# Patient Record
Sex: FEMALE | Race: WHITE | HISPANIC OR LATINO | Employment: OTHER | URBAN - METROPOLITAN AREA
[De-identification: names, ages, dates, MRNs, and addresses within clinical notes are randomized per-mention and may not be internally consistent; named-entity substitution may affect disease eponyms.]

---

## 2017-01-16 ENCOUNTER — OFFICE VISIT (OUTPATIENT)
Dept: SURGERY | Facility: CLINIC | Age: 57
End: 2017-01-16

## 2017-01-16 VITALS
DIASTOLIC BLOOD PRESSURE: 72 MMHG | BODY MASS INDEX: 30.56 KG/M2 | HEIGHT: 64 IN | WEIGHT: 179 LBS | SYSTOLIC BLOOD PRESSURE: 102 MMHG

## 2017-01-16 DIAGNOSIS — Z09 FOLLOW UP: Primary | ICD-10-CM

## 2017-01-16 PROCEDURE — 99213 OFFICE O/P EST LOW 20 MIN: CPT | Performed by: SURGERY

## 2017-01-16 RX ORDER — SUCRALFATE ORAL 1 G/10ML
1 SUSPENSION ORAL 4 TIMES DAILY
Qty: 1200 ML | Refills: 0 | Status: SHIPPED | OUTPATIENT
Start: 2017-01-16 | End: 2017-02-15

## 2017-01-16 NOTE — PROGRESS NOTES
PATIENT INFORMATION  Violet Rahman   - 1960    CHIEF COMPLAINT  F/U EGD AND C/S, ABD PAIN, BLOATING    HISTORY OF PRESENT ILLNESS  HPI  Patient presents to the office today for follow-up.  She status post upper and lower endoscopies for abdominal pain bloating and constipation.  She reports she is doing very well she stopped taking the Carafate as she could not take the pill form.  She did not call the office to inform us.  She reports she is taking Protonix.  She is also on Metamucil as needed.  She reports her acid reflux, nausea and epigastric abdominal pain much improved.  She will still feel bloated especially when she is constipated.  She is modifying her diet and using Metamucil as needed.  Upper endoscopy and colonoscopy results discussed in detail with the patient.  I have recommended to her to continue PPI and Carafate.  I have also informed her that she will need a repeat colonoscopy in one year with a 2 day prep due to the poor prep.    REVIEW OF SYSTEMS  Review of Systems   Constitutional: Negative.    Respiratory: Negative.    Gastrointestinal: Positive for abdominal distention and constipation.   Genitourinary: Negative.    Musculoskeletal: Negative.    Skin: Negative.    Hematological: Negative.    Psychiatric/Behavioral: Negative.          ACTIVE PROBLEMS  Patient Active Problem List    Diagnosis   • Gastroesophageal reflux disease [K21.9]   • Family history of colon cancer requiring screening colonoscopy [Z80.0]   • Constipation [K59.00]   • Nausea [R11.0]   • Constipation [K59.00]         PAST MEDICAL HISTORY  Past Medical History   Diagnosis Date   • Anxiety    • Depression    • Diabetes    • Hypertension    • Hypothyroidism    • OA (osteoarthritis)    • OAB (overactive bladder)    • AMELIA (obstructive sleep apnea)    • Stomach ulcer          SURGICAL HISTORY  Past Surgical History   Procedure Laterality Date   • Knee surgery Right    • Laparoscopic cholecystectomy     •  section        X2   • Endoscopy N/A 12/27/2016     Procedure: ESOPHAGOGASTRODUODENOSCOPY with biopsies  and link test;  Surgeon: Raiza Talbot MD;  Location: [facility] OR;  Service:    • Colonoscopy N/A 12/27/2016     Procedure: COLONOSCOPY;  Surgeon: Raiza Talbot MD;  Location: [facility] OR;  Service:          FAMILY HISTORY  Family History   Problem Relation Age of Onset   • Hypertension Mother    • Stroke Mother    • Diabetes Mother    • Lung cancer Father    • Diabetes Sister    • Colon cancer Sister          SOCIAL HISTORY  Social History     Occupational History   • Not on file.     Social History Main Topics   • Smoking status: Never Smoker   • Smokeless tobacco: Never Used   • Alcohol use No   • Drug use: No   • Sexual activity: Defer         CURRENT MEDICATIONS    Current Outpatient Prescriptions:   •  atorvastatin (LIPITOR) 10 MG tablet, Take 80 mg by mouth Daily., Disp: , Rfl:   •  ferrous sulfate 325 (65 FE) MG tablet, Take 325 mg by mouth Daily With Breakfast., Disp: , Rfl:   •  fesoterodine fumarate (TOVIAZ ER) 8 MG tablet sustained-release 24 hour capsule, Take 8 mg by mouth Daily., Disp: , Rfl:   •  fluticasone (FLONASE) 50 MCG/ACT nasal spray, 2 sprays into each nostril Daily., Disp: , Rfl:   •  glipiZIDE (GLUCOTROL) 10 MG tablet, Take 10 mg by mouth 2 (Two) Times a Day Before Meals., Disp: , Rfl:   •  HYDROcodone-acetaminophen (NORCO)  MG per tablet, Take 1 tablet by mouth Every 6 (Six) Hours As Needed for moderate pain (4-6)., Disp: , Rfl:   •  levothyroxine (SYNTHROID, LEVOTHROID) 50 MCG tablet, Take 50 mcg by mouth Daily., Disp: , Rfl:   •  lisinopril (PRINIVIL,ZESTRIL) 10 MG tablet, Take 10 mg by mouth Daily., Disp: , Rfl:   •  metFORMIN (GLUCOPHAGE) 500 MG tablet, Take 1,000 mg by mouth., Disp: , Rfl:   •  pantoprazole (PROTONIX) 40 MG EC tablet, Take 1 tablet by mouth Daily for 30 days., Disp: 30 tablet, Rfl: 0  •  sucralfate (CARAFATE) 1 GM/10ML suspension, Take 10 mL by mouth 4 (Four) Times a  "Day for 30 days., Disp: 1200 mL, Rfl: 0  •  zolpidem (AMBIEN) 5 MG tablet, Take 5 mg by mouth At Night As Needed for sleep., Disp: , Rfl:     ALLERGIES  Review of patient's allergies indicates no known allergies.    VITALS  Vitals:    01/16/17 0941   BP: 102/72   Weight: 179 lb (81.2 kg)   Height: 64\" (162.6 cm)       LAST RESULTS   Admission on 12/27/2016, Discharged on 12/27/2016   Component Date Value Ref Range Status   • Glucose 12/27/2016 110  70 - 130 mg/dL Final   • Case Report 12/27/2016    Final                    Value:Surgical Pathology Report                         Case: [id number]                                  Authorizing Provider:  Raiza Talbot MD       Collected:           12/27/2016 09:07 AM          Ordering Location:     [facility]   Received:            12/27/2016 12:57 PM                                 OR                                                                           Pathologist:           Daren Wiseman MD                                                      Specimens:   1) - Small Intestine, Duodenum, small bowel bx                                                      2) - Stomach, gastric bx                                                                            3) - Esophagus, Distal, bx                                                                • Final Diagnosis 12/27/2016    Final                    Value:This result contains rich text formatting which cannot be displayed here.   • Urease 12/27/2016 Negative  Negative Final     No results found.    PHYSICAL EXAM  Physical Exam   Constitutional: She is oriented to person, place, and time. She appears well-developed and well-nourished.   HENT:   Head: Normocephalic and atraumatic.   Eyes: No scleral icterus.   Neck: Normal range of motion. Neck supple.   Cardiovascular: Normal rate, regular rhythm and normal heart sounds.    Pulmonary/Chest: Breath sounds normal.   Abdominal:   Soft nondistended " nontender positive bowel sounds in all 4 quadrants   Lymphadenopathy:     She has no cervical adenopathy.   Neurological: She is alert and oriented to person, place, and time.   Skin: Skin is warm and dry.   Nursing note and vitals reviewed.      ASSESSMENT    GERD with esophagitis/gastritis -- continue PPI and Carafate.  Carafate liquid suspension E scribed.  GERD dietary last on modification discussed.  Caffeine nicotine alcohol cessation discussed patient is agreeable.    Constipation/diverticulosis/internal hemorrhoids  Family history of colon cancer and polyps  Colonoscopy-with poor prep.  Will need repeat colonoscopy in one year with a 2 day prep.  High-fiber instruction sheet provided.  List of over-the-counter fiber supplements and probiotics provided.    PLAN  We'll place on recall list for one year for colonoscopy with 2 day prep.  We'll follow-up in the office in 3 months.  Patient was advised to call the office sooner should she have worsening symptoms or go to the nearest emergency room.                          a

## 2017-01-16 NOTE — MR AVS SNAPSHOT
Violet Rahman   1/16/2017 10:45 AM   Office Visit    Dept Phone:  [phone]   Encounter #:  [id number]    Provider:  Raiza Talbot MD   Department:  [facility] GENERAL SURGERY                Your Full Care Plan              Today's Medication Changes          These changes are accurate as of: 1/16/17 10:24 AM.  If you have any questions, ask your nurse or doctor.               New Medication(s)Ordered:     sucralfate 1 GM/10ML suspension   Commonly known as:  CARAFATE   Take 10 mL by mouth 4 (Four) Times a Day for 30 days.   Replaces:  sucralfate 1 G tablet   Started by:  Raiza Talbot MD         Stop taking medication(s)listed here:     sucralfate 1 G tablet   Commonly known as:  CARAFATE   Replaced by:  sucralfate 1 GM/10ML suspension   Stopped by:  Raiza Talbot MD                Where to Get Your Medications      These medications were sent to [address] [phone] [place] [id number] [address] [address]     Phone:  [phone]     sucralfate 1 GM/10ML suspension                  Your Updated Medication List          This list is accurate as of: 1/16/17 10:24 AM.  Always use your most recent med list.                atorvastatin 10 MG tablet   Commonly known as:  LIPITOR       ferrous sulfate 325 (65 FE) MG tablet       fesoterodine fumarate 8 MG tablet sustained-release 24 hour capsule   Commonly known as:  TOVIAZ ER       fluticasone 50 MCG/ACT nasal spray   Commonly known as:  FLONASE       glipiZIDE 10 MG tablet   Commonly known as:  GLUCOTROL       HYDROcodone-acetaminophen  MG per tablet   Commonly known as:  NORCO       levothyroxine 50 MCG tablet   Commonly known as:  SYNTHROID, LEVOTHROID       lisinopril 10 MG tablet   Commonly known as:  PRINIVIL,ZESTRIL       metFORMIN 500 MG tablet   Commonly known as:  GLUCOPHAGE       pantoprazole 40 MG EC tablet   Commonly  known as:  PROTONIX   Take 1 tablet by mouth Daily for 30 days.       sucralfate 1 GM/10ML suspension   Commonly known as:  CARAFATE   Take 10 mL by mouth 4 (Four) Times a Day for 30 days.       zolpidem 5 MG tablet   Commonly known as:  AMBIEN               Instructions     None    Patient Instructions History      Upcoming Appointments     Visit Type Date Time Department    OFFICE VISIT 2017 10:45 AM [facility]    OFFICE VISIT 2017 10:00 AM [facility]      [employer] Signup     [facility] [employer] allows you to send messages to your doctor, view your test results, renew your prescriptions, schedule appointments, and more. To sign up, go to [employer] and click on the Sign Up Now link in the New User? box. Enter your [employer] Activation Code exactly as it appears below along with the last four digits of your Social Security Number and your Date of Birth () to complete the sign-up process. If you do not sign up before the expiration date, you must request a new code.    [employer] Activation Code: [email or web address]  Expires: 2017 10:24 AM    If you have questions, you can email [email or web address] or call [phone] to talk to our [employer] staff. Remember, [employer] is NOT to be used for urgent needs. For medical emergencies, dial 911.               Other Info from Your Visit           Your Appointments     2017 10:45 AM EST   Office Visit with Raiza Talbot MD   [facility] GENERAL SURGERY (--)    [address]  [place] [id number] [phone]           Arrive 15 minutes prior to appointment.            2017 10:00 AM EDT   Office Visit with Raiza Talbot MD   [facility] GENERAL SURGERY (--)    [address]  [place] [id number] [phone]           Arrive 15 minutes prior to appointment.              Allergies     No Known Allergies      Reason for Visit      "Follow-up           Vital Signs     Blood Pressure Height Weight Body Mass Index Smoking Status       102/72 64\" (162.6 cm) 179 lb (81.2 kg) 30.73 kg/m2 Never Smoker         "

## 2017-01-16 NOTE — LETTER
2017     Sukhjinder Oneill, APRN  [address]  [place] [address]    Patient: Violet Rahman   YOB: 1960   Date of Visit: 2017       Dear Dr. Oneill, PATRIZIA:    Thank you for referring Violet Rahman to me for evaluation. Below are the relevant portions of my assessment and plan of care.    If you have questions, please do not hesitate to call me. I look forward to following Violet along with you.         Sincerely,        Raiza Talbot MD        CC: No Recipients  Raiza Talbot MD  2017  2:42 PM  Sign at close encounter      PATIENT INFORMATION  Violet Rahman   - 1960    CHIEF COMPLAINT  F/U EGD AND C/S, ABD PAIN, BLOATING    HISTORY OF PRESENT ILLNESS  HPI  Patient presents to the office today for follow-up.  She status post upper and lower endoscopies for abdominal pain bloating and constipation.  She reports she is doing very well she stopped taking the Carafate as she could not take the pill form.  She did not call the office to inform us.  She reports she is taking Protonix.  She is also on Metamucil as needed.  She reports her acid reflux, nausea and epigastric abdominal pain much improved.  She will still feel bloated especially when she is constipated.  She is modifying her diet and using Metamucil as needed.  Upper endoscopy and colonoscopy results discussed in detail with the patient.  I have recommended to her to continue PPI and Carafate.  I have also informed her that she will need a repeat colonoscopy in one year with a 2 day prep due to the poor prep.    REVIEW OF SYSTEMS  Review of Systems   Constitutional: Negative.    Respiratory: Negative.    Gastrointestinal: Positive for abdominal distention and constipation.   Genitourinary: Negative.    Musculoskeletal: Negative.    Skin: Negative.    Hematological: Negative.    Psychiatric/Behavioral: Negative.          ACTIVE PROBLEMS  Patient Active Problem List    Diagnosis   • Gastroesophageal reflux disease  [K21.9]   • Family history of colon cancer requiring screening colonoscopy [Z80.0]   • Constipation [K59.00]   • Nausea [R11.0]   • Constipation [K59.00]         PAST MEDICAL HISTORY  Past Medical History   Diagnosis Date   • Anxiety    • Depression    • Diabetes    • Hypertension    • Hypothyroidism    • OA (osteoarthritis)    • OAB (overactive bladder)    • AMELIA (obstructive sleep apnea)    • Stomach ulcer          SURGICAL HISTORY  Past Surgical History   Procedure Laterality Date   • Knee surgery Right    • Laparoscopic cholecystectomy     •  section       X2   • Endoscopy N/A 2016     Procedure: ESOPHAGOGASTRODUODENOSCOPY with biopsies  and link test;  Surgeon: Raiza Talbot MD;  Location: [facility] OR;  Service:    • Colonoscopy N/A 2016     Procedure: COLONOSCOPY;  Surgeon: Raiza Talbot MD;  Location: [facility] OR;  Service:          FAMILY HISTORY  Family History   Problem Relation Age of Onset   • Hypertension Mother    • Stroke Mother    • Diabetes Mother    • Lung cancer Father    • Diabetes Sister    • Colon cancer Sister          SOCIAL HISTORY  Social History     Occupational History   • Not on file.     Social History Main Topics   • Smoking status: Never Smoker   • Smokeless tobacco: Never Used   • Alcohol use No   • Drug use: No   • Sexual activity: Defer         CURRENT MEDICATIONS    Current Outpatient Prescriptions:   •  atorvastatin (LIPITOR) 10 MG tablet, Take 80 mg by mouth Daily., Disp: , Rfl:   •  ferrous sulfate 325 (65 FE) MG tablet, Take 325 mg by mouth Daily With Breakfast., Disp: , Rfl:   •  fesoterodine fumarate (TOVIAZ ER) 8 MG tablet sustained-release 24 hour capsule, Take 8 mg by mouth Daily., Disp: , Rfl:   •  fluticasone (FLONASE) 50 MCG/ACT nasal spray, 2 sprays into each nostril Daily., Disp: , Rfl:   •  glipiZIDE (GLUCOTROL) 10 MG tablet, Take 10 mg by mouth 2 (Two) Times a Day Before Meals., Disp: , Rfl:   •  HYDROcodone-acetaminophen (NORCO)  MG per  "tablet, Take 1 tablet by mouth Every 6 (Six) Hours As Needed for moderate pain (4-6)., Disp: , Rfl:   •  levothyroxine (SYNTHROID, LEVOTHROID) 50 MCG tablet, Take 50 mcg by mouth Daily., Disp: , Rfl:   •  lisinopril (PRINIVIL,ZESTRIL) 10 MG tablet, Take 10 mg by mouth Daily., Disp: , Rfl:   •  metFORMIN (GLUCOPHAGE) 500 MG tablet, Take 1,000 mg by mouth., Disp: , Rfl:   •  pantoprazole (PROTONIX) 40 MG EC tablet, Take 1 tablet by mouth Daily for 30 days., Disp: 30 tablet, Rfl: 0  •  sucralfate (CARAFATE) 1 GM/10ML suspension, Take 10 mL by mouth 4 (Four) Times a Day for 30 days., Disp: 1200 mL, Rfl: 0  •  zolpidem (AMBIEN) 5 MG tablet, Take 5 mg by mouth At Night As Needed for sleep., Disp: , Rfl:     ALLERGIES  Review of patient's allergies indicates no known allergies.    VITALS  Vitals:    01/16/17 0941   BP: 102/72   Weight: 179 lb (81.2 kg)   Height: 64\" (162.6 cm)       LAST RESULTS   Admission on 12/27/2016, Discharged on 12/27/2016   Component Date Value Ref Range Status   • Glucose 12/27/2016 110  70 - 130 mg/dL Final   • Case Report 12/27/2016    Final                    Value:Surgical Pathology Report                         Case: [id number]                                  Authorizing Provider:  Raiza Talbot MD       Collected:           12/27/2016 09:07 AM          Ordering Location:     [facility]   Received:            12/27/2016 12:57 PM                                 OR                                                                           Pathologist:           Daren Wiseman MD                                                      Specimens:   1) - Small Intestine, Duodenum, small bowel bx                                                      2) - Stomach, gastric bx                                                                            3) - Esophagus, Distal, bx                                                                • Final Diagnosis 12/27/2016    Final      "               Value:This result contains rich text formatting which cannot be displayed here.   • Urease 12/27/2016 Negative  Negative Final     No results found.    PHYSICAL EXAM  Physical Exam   Constitutional: She is oriented to person, place, and time. She appears well-developed and well-nourished.   HENT:   Head: Normocephalic and atraumatic.   Eyes: No scleral icterus.   Neck: Normal range of motion. Neck supple.   Cardiovascular: Normal rate, regular rhythm and normal heart sounds.    Pulmonary/Chest: Breath sounds normal.   Abdominal:   Soft nondistended nontender positive bowel sounds in all 4 quadrants   Lymphadenopathy:     She has no cervical adenopathy.   Neurological: She is alert and oriented to person, place, and time.   Skin: Skin is warm and dry.   Nursing note and vitals reviewed.      ASSESSMENT    GERD with esophagitis/gastritis -- continue PPI and Carafate.  Carafate liquid suspension E scribed.  GERD dietary last on modification discussed.  Caffeine nicotine alcohol cessation discussed patient is agreeable.    Constipation/diverticulosis/internal hemorrhoids  Family history of colon cancer and polyps  Colonoscopy-with poor prep.  Will need repeat colonoscopy in one year with a 2 day prep.  High-fiber instruction sheet provided.  List of over-the-counter fiber supplements and probiotics provided.    PLAN  We'll place on recall list for one year for colonoscopy with 2 day prep.  We'll follow-up in the office in 3 months.  Patient was advised to call the office sooner should she have worsening symptoms or go to the nearest emergency room.                          a

## 2017-06-01 ENCOUNTER — HOSPITAL ENCOUNTER (EMERGENCY)
Facility: HOSPITAL | Age: 57
Discharge: HOME | End: 2017-06-01
Attending: EMERGENCY MEDICINE | Admitting: EMERGENCY MEDICINE

## 2017-06-01 VITALS
TEMPERATURE: 98.3 F | OXYGEN SATURATION: 96 % | DIASTOLIC BLOOD PRESSURE: 91 MMHG | WEIGHT: 180 LBS | SYSTOLIC BLOOD PRESSURE: 154 MMHG | RESPIRATION RATE: 18 BRPM | HEART RATE: 76 BPM | HEIGHT: 64 IN | BODY MASS INDEX: 30.73 KG/M2

## 2017-06-01 DIAGNOSIS — K21.9 GASTROESOPHAGEAL REFLUX DISEASE, ESOPHAGITIS PRESENCE NOT SPECIFIED: Primary | ICD-10-CM

## 2017-06-01 LAB
ALBUMIN SERPL-MCNC: 4.3 G/DL (ref 3.5–5.2)
ALBUMIN/GLOB SERPL: 1.8 G/DL
ALP SERPL-CCNC: 64 U/L (ref 40–129)
ALT SERPL W P-5'-P-CCNC: 28 U/L (ref 5–33)
ANION GAP SERPL CALCULATED.3IONS-SCNC: 12.9 MMOL/L
AST SERPL-CCNC: 22 U/L (ref 5–32)
BASOPHILS # BLD AUTO: 0.02 10*3/MM3 (ref 0–0.2)
BASOPHILS NFR BLD AUTO: 0.5 % (ref 0–2)
BILIRUB SERPL-MCNC: 0.6 MG/DL (ref 0.2–1.2)
BILIRUB UR QL STRIP: NEGATIVE
BUN BLD-MCNC: 7 MG/DL (ref 6–20)
BUN/CREAT SERPL: 12.5 (ref 7–25)
CALCIUM SPEC-SCNC: 9.1 MG/DL (ref 8.6–10.5)
CHLORIDE SERPL-SCNC: 100 MMOL/L (ref 98–107)
CLARITY UR: CLEAR
CO2 SERPL-SCNC: 24.1 MMOL/L (ref 22–29)
COLOR UR: YELLOW
CREAT BLD-MCNC: 0.56 MG/DL (ref 0.57–1)
DEPRECATED RDW RBC AUTO: 38.7 FL (ref 37–54)
EOSINOPHIL # BLD AUTO: 0.09 10*3/MM3 (ref 0.1–0.3)
EOSINOPHIL NFR BLD AUTO: 2.1 % (ref 0–4)
ERYTHROCYTE [DISTWIDTH] IN BLOOD BY AUTOMATED COUNT: 12.2 % (ref 11.5–14.5)
GFR SERPL CREATININE-BSD FRML MDRD: 112 ML/MIN/1.73
GLOBULIN UR ELPH-MCNC: 2.4 GM/DL
GLUCOSE BLD-MCNC: 114 MG/DL (ref 65–99)
GLUCOSE UR STRIP-MCNC: NEGATIVE MG/DL
HCT VFR BLD AUTO: 41.5 % (ref 37–47)
HGB BLD-MCNC: 15 G/DL (ref 12–16)
HGB UR QL STRIP.AUTO: NEGATIVE
HOLD SPECIMEN: NORMAL
HOLD SPECIMEN: NORMAL
IMM GRANULOCYTES # BLD: 0.01 10*3/MM3 (ref 0–0.03)
IMM GRANULOCYTES NFR BLD: 0.2 % (ref 0–0.5)
KETONES UR QL STRIP: NEGATIVE
LEUKOCYTE ESTERASE UR QL STRIP.AUTO: NEGATIVE
LIPASE SERPL-CCNC: 14 U/L (ref 13–60)
LYMPHOCYTES # BLD AUTO: 1.15 10*3/MM3 (ref 0.6–4.8)
LYMPHOCYTES NFR BLD AUTO: 26.9 % (ref 20–45)
MCH RBC QN AUTO: 31.4 PG (ref 27–31)
MCHC RBC AUTO-ENTMCNC: 36.1 G/DL (ref 31–37)
MCV RBC AUTO: 87 FL (ref 81–99)
MONOCYTES # BLD AUTO: 0.37 10*3/MM3 (ref 0–1)
MONOCYTES NFR BLD AUTO: 8.7 % (ref 3–8)
NEUTROPHILS # BLD AUTO: 2.63 10*3/MM3 (ref 1.5–8.3)
NEUTROPHILS NFR BLD AUTO: 61.6 % (ref 45–70)
NITRITE UR QL STRIP: NEGATIVE
NRBC BLD MANUAL-RTO: 0 /100 WBC (ref 0–0)
PH UR STRIP.AUTO: 7.5 [PH] (ref 4.5–8)
PLATELET # BLD AUTO: 227 10*3/MM3 (ref 140–500)
PMV BLD AUTO: 9.1 FL (ref 7.4–10.4)
POTASSIUM BLD-SCNC: 3.9 MMOL/L (ref 3.5–5.2)
PROT SERPL-MCNC: 6.7 G/DL (ref 6–8.5)
PROT UR QL STRIP: NEGATIVE
RBC # BLD AUTO: 4.77 10*6/MM3 (ref 4.2–5.4)
SODIUM BLD-SCNC: 137 MMOL/L (ref 136–145)
SP GR UR STRIP: <1.003 (ref 1–1.03)
UROBILINOGEN UR QL STRIP: ABNORMAL
WBC NRBC COR # BLD: 4.27 10*3/MM3 (ref 4.8–10.8)
WHOLE BLOOD HOLD SPECIMEN: NORMAL
WHOLE BLOOD HOLD SPECIMEN: NORMAL

## 2017-06-01 PROCEDURE — 85025 COMPLETE CBC W/AUTO DIFF WBC: CPT | Performed by: EMERGENCY MEDICINE

## 2017-06-01 PROCEDURE — 83690 ASSAY OF LIPASE: CPT | Performed by: EMERGENCY MEDICINE

## 2017-06-01 PROCEDURE — 80053 COMPREHEN METABOLIC PANEL: CPT | Performed by: EMERGENCY MEDICINE

## 2017-06-01 PROCEDURE — 99284 EMERGENCY DEPT VISIT MOD MDM: CPT | Performed by: EMERGENCY MEDICINE

## 2017-06-01 PROCEDURE — 99283 EMERGENCY DEPT VISIT LOW MDM: CPT

## 2017-06-01 PROCEDURE — 81003 URINALYSIS AUTO W/O SCOPE: CPT | Performed by: EMERGENCY MEDICINE

## 2017-06-01 RX ORDER — FAMOTIDINE 40 MG/1
40 TABLET, FILM COATED ORAL NIGHTLY
Qty: 90 TABLET | Refills: 0 | Status: SHIPPED | OUTPATIENT
Start: 2017-06-01 | End: 2019-12-23

## 2017-06-01 RX ORDER — SODIUM CHLORIDE 0.9 % (FLUSH) 0.9 %
10 SYRINGE (ML) INJECTION AS NEEDED
Status: DISCONTINUED | OUTPATIENT
Start: 2017-06-01 | End: 2017-06-01 | Stop reason: HOSPADM

## 2017-06-01 RX ORDER — LEVOTHYROXINE SODIUM 0.07 MG/1
75 TABLET ORAL DAILY
COMMUNITY
End: 2018-05-19

## 2017-06-01 NOTE — ED PROVIDER NOTES
Subjective   History of Present Illness  History of Present Illness    Chief complaint: Abdominal pain    Location: Epigastric    Quality/Severity:  Moderate    Timing/Duration: Intermittent ×2 days    Modifying Factors: Patient previously seen by Dr. Carreno and carries a diagnosis of GERD.  Not currently on any antacids    Associated Symptoms: Nausea    Narrative: The patient is a 56-year-old  female who presents as noted above.  No vomiting, diarrhea or fever.  Food does sometimes seem to exacerbate the discomfort.    Review of Systems   Constitutional: Negative for activity change, appetite change, fatigue and fever.   HENT: Negative for congestion.    Respiratory: Negative for cough, shortness of breath and wheezing.    Cardiovascular: Negative for chest pain, palpitations and leg swelling.   Gastrointestinal: Positive for abdominal pain (epigastric and described as sharp and burning) and nausea. Negative for blood in stool, diarrhea and vomiting.   Endocrine: Negative for polydipsia.   Genitourinary: Negative for difficulty urinating, dysuria, flank pain, frequency and urgency.   Musculoskeletal: Negative for back pain.   Skin: Negative for rash.   Neurological: Negative for dizziness, weakness and headaches.   Psychiatric/Behavioral: Negative for confusion.       Past Medical History:   Diagnosis Date   • Anxiety    • Depression    • Diabetes    • Hypertension    • Hypothyroidism    • OA (osteoarthritis)    • OAB (overactive bladder)    • AMELIA (obstructive sleep apnea)    • Stomach ulcer        No Known Allergies    Past Surgical History:   Procedure Laterality Date   •  SECTION      X2   • COLONOSCOPY N/A 2016    Procedure: COLONOSCOPY;  Surgeon: Raiza Talbot MD;  Location: [facility];  Service:    • ENDOSCOPY N/A 2016    Procedure: ESOPHAGOGASTRODUODENOSCOPY with biopsies  and link test;  Surgeon: Raiza Talbot MD;  Location: [facility];  Service:    • KNEE SURGERY Right    •  LAPAROSCOPIC CHOLECYSTECTOMY         Family History   Problem Relation Age of Onset   • Hypertension Mother    • Stroke Mother    • Diabetes Mother    • Lung cancer Father    • Diabetes Sister    • Colon cancer Sister        Social History     Social History   • Marital status: Single     Spouse name: N/A   • Number of children: N/A   • Years of education: N/A     Social History Main Topics   • Smoking status: Never Smoker   • Smokeless tobacco: Never Used   • Alcohol use No   • Drug use: No   • Sexual activity: Defer     Other Topics Concern   • None     Social History Narrative           Objective   Physical Exam   Constitutional: She is oriented to person, place, and time. She appears well-developed and well-nourished.   The patient is a healthy-appearing, 57-year-old, female in no acute distress   HENT:   Head: Normocephalic and atraumatic.   Eyes: Conjunctivae and EOM are normal.   Cardiovascular: Normal rate.    Pulmonary/Chest: Effort normal and breath sounds normal.   Abdominal: Soft. Bowel sounds are normal. There is tenderness (mild in epigastrium and left upper quadrant). There is no rebound and no guarding.   Neurological: She is alert and oriented to person, place, and time.   Skin: Skin is warm and dry.   Psychiatric: She has a normal mood and affect. Her behavior is normal. Thought content normal.   Nursing note and vitals reviewed.      Final diagnoses:   Gastroesophageal reflux disease, esophagitis presence not specified       Procedures         ED Course  ED Course   Comment By Time   GERD discussed at length with patient.  Patient advised to avoid alcohol, NSAIDs, spicy food and over eating.  It is also recommended that the patient not eat for 3 hours before sleeping.  Advised to follow-up with her primary care provider or Dr. Carreno should her symptoms persist.  Pepcid prescribed. Billy Ortiz MD 06/01 1303                  [facility]  Number of Diagnoses or Management Options  Gastroesophageal  reflux disease, esophagitis presence not specified:      Amount and/or Complexity of Data Reviewed  Clinical lab tests: ordered and reviewed    Risk of Complications, Morbidity, and/or Mortality  Presenting problems: moderate  Diagnostic procedures: moderate  Management options: moderate    My differential diagnosis for abdominal pain includes but is not limited to:  Gastritis, gastroenteritis, peptic ulcer disease, GERD, esophageal perforation, acute appendicitis, mesenteric adenitis, Meckel’s diverticulum, epiploic appendagitis, diverticulitis, colon cancer, ulcerative colitis, Crohn’s disease, intussusception, small bowel obstruction, adhesions, ischemic bowel, perforated viscus, ileus, obstipation, biliary colic, cholecystitis, cholelithiasis, Nimesh-Bowen Yahir, hepatitis, pancreatitis, common bile duct obstruction, cholangitis, bile leak, splenic trauma, splenic rupture, splenic infarction, splenic abscess, abdominal abscess, ascites, spontaneous bacterial peritonitis, hernia, UTI, cystitis, prostatitis, ureterolithiasis, urinary obstruction, ovarian cyst, torsion, pregnancy, ectopic pregnancy, PID, pelvic abscess, mittelschmerz, endometriosis, AAA, myocardial infarction, pneumonia, cancer, porphyria, DKA, medications, sickle cell, viral syndrome, zoster    Labs this visit  Lab Results (last 24 hours)     Procedure Component Value Units Date/Time    CBC & Differential [[id number]] Collected:  06/01/17 1115    Specimen:  Blood Updated:  06/01/17 1133    Narrative:       The following orders were created for panel order CBC & Differential.  Procedure                               Abnormality         Status                     ---------                               -----------         ------                     CBC Auto Differential[[id number]]        Abnormal            Final result                 Please view results for these tests on the individual orders.    Comprehensive Metabolic Panel [[id number]]  (Abnormal)  Collected:  06/01/17 1115    Specimen:  Blood Updated:  06/01/17 1148     Glucose 114 (H) mg/dL      BUN 7 mg/dL      Creatinine 0.56 (L) mg/dL      Sodium 137 mmol/L      Potassium 3.9 mmol/L      Chloride 100 mmol/L      CO2 24.1 mmol/L      Calcium 9.1 mg/dL      Total Protein 6.7 g/dL      Albumin 4.30 g/dL      ALT (SGPT) 28 U/L      AST (SGOT) 22 U/L      Alkaline Phosphatase 64 U/L      Total Bilirubin 0.6 mg/dL      eGFR Non African Amer 112 mL/min/1.73      Globulin 2.4 gm/dL      A/G Ratio 1.8 g/dL      BUN/Creatinine Ratio 12.5     Anion Gap 12.9 mmol/L     Lipase [[id number]]  (Normal) Collected:  06/01/17 1115    Specimen:  Blood Updated:  06/01/17 1148     Lipase 14 U/L     CBC Auto Differential [[id number]]  (Abnormal) Collected:  06/01/17 1115    Specimen:  Blood Updated:  06/01/17 1133     WBC 4.27 (L) 10*3/mm3      RBC 4.77 10*6/mm3      Hemoglobin 15.0 g/dL      Hematocrit 41.5 %      MCV 87.0 fL      MCH 31.4 (H) pg      MCHC 36.1 g/dL      RDW 12.2 %      RDW-SD 38.7 fl      MPV 9.1 fL      Platelets 227 10*3/mm3      Neutrophil % 61.6 %      Lymphocyte % 26.9 %      Monocyte % 8.7 (H) %      Eosinophil % 2.1 %      Basophil % 0.5 %      Immature Grans % 0.2 %      Neutrophils, Absolute 2.63 10*3/mm3      Lymphocytes, Absolute 1.15 10*3/mm3      Monocytes, Absolute 0.37 10*3/mm3      Eosinophils, Absolute 0.09 (L) 10*3/mm3      Basophils, Absolute 0.02 10*3/mm3      Immature Grans, Absolute 0.01 10*3/mm3      nRBC 0.0 /100 WBC     Urinalysis With / Culture If Indicated [[id number]]  (Abnormal) Collected:  06/01/17 1129    Specimen:  Urine from Urine, Clean Catch Updated:  06/01/17 1231     Color, UA Yellow     Appearance, UA Clear     pH, UA 7.5     Specific Gravity, UA <1.003 (L)     Glucose, UA Negative     Ketones, UA Negative     Bilirubin, UA Negative     Blood, UA Negative     Protein, UA Negative     Leuk Esterase, UA Negative     Nitrite, UA Negative     Urobilinogen, UA 0.2 E.U./dL     Narrative:       Urine microscopic not indicated.        Prescribed on discharge             Medication List      New Prescriptions          famotidine 40 MG tablet   Commonly known as:  PEPCID   Take 1 tablet by mouth Every Night.         Changed          levothyroxine 75 MCG tablet   Commonly known as:  SYNTHROID, LEVOTHROID   What changed:  Another medication with the same name was removed. Continue   taking this medication, and follow the directions you see here.         Stop          atorvastatin 10 MG tablet   Commonly known as:  LIPITOR       ferrous sulfate 325 (65 FE) MG tablet       fesoterodine fumarate 8 MG tablet sustained-release 24 hour capsule   Commonly known as:  TOVIAZ ER       fluticasone 50 MCG/ACT nasal spray   Commonly known as:  FLONASE       HYDROcodone-acetaminophen  MG per tablet   Commonly known as:  NORCO       lisinopril 10 MG tablet   Commonly known as:  PRINIVIL,ZESTRIL       loratadine 10 MG tablet   Commonly known as:  CLARITIN           All lab results, imaging results and other tests were reviewed by Billy Ortiz MD and unless otherwise specified were found to be unremarkable.      Final diagnoses:   Gastroesophageal reflux disease, esophagitis presence not specified            Billy Ortiz MD  06/01/17 3265  Addendum: [facility] contacted me to request that change on the patient's Pepcid.  Zantac 150 mg twice a day prescribed for 90 days     Billy Ortiz MD  07/20/17 9566

## 2017-11-30 ENCOUNTER — OFFICE VISIT (OUTPATIENT)
Dept: ORTHOPEDIC SURGERY | Facility: CLINIC | Age: 57
End: 2017-11-30

## 2017-11-30 DIAGNOSIS — M17.11 PRIMARY OSTEOARTHRITIS OF RIGHT KNEE: Primary | ICD-10-CM

## 2017-11-30 PROCEDURE — 99213 OFFICE O/P EST LOW 20 MIN: CPT | Performed by: ORTHOPAEDIC SURGERY

## 2017-11-30 PROCEDURE — 20610 DRAIN/INJ JOINT/BURSA W/O US: CPT | Performed by: ORTHOPAEDIC SURGERY

## 2017-11-30 RX ORDER — BETAMETHASONE SODIUM PHOSPHATE AND BETAMETHASONE ACETATE 3; 3 MG/ML; MG/ML
6 INJECTION, SUSPENSION INTRA-ARTICULAR; INTRALESIONAL; INTRAMUSCULAR; SOFT TISSUE
Status: COMPLETED | OUTPATIENT
Start: 2017-11-30 | End: 2017-11-30

## 2017-11-30 RX ORDER — LIDOCAINE HYDROCHLORIDE 10 MG/ML
8 INJECTION, SOLUTION EPIDURAL; INFILTRATION; INTRACAUDAL; PERINEURAL
Status: COMPLETED | OUTPATIENT
Start: 2017-11-30 | End: 2017-11-30

## 2017-11-30 RX ADMIN — BETAMETHASONE SODIUM PHOSPHATE AND BETAMETHASONE ACETATE 6 MG: 3; 3 INJECTION, SUSPENSION INTRA-ARTICULAR; INTRALESIONAL; INTRAMUSCULAR; SOFT TISSUE at 13:38

## 2017-11-30 RX ADMIN — LIDOCAINE HYDROCHLORIDE 8 ML: 10 INJECTION, SOLUTION EPIDURAL; INFILTRATION; INTRACAUDAL; PERINEURAL at 13:38

## 2017-11-30 NOTE — PROGRESS NOTES
Subjective:  Right knee pain     Patient ID: Violet Rahman is a 57 y.o. female.    Chief Complaint:    History of Present Illness 57-year-old female known to me seen last year for osteoarthritis of the right knee returns with renewed complaints of pain and discomfort.  When last seen in September 2016 underwent a cortisone injection which helped until just recently is now having disabling pain with all activities of daily living.  She has taken an anti-inflammatory, OTC, but not on a regular basis.  She states that she does take it does help       Social History     Occupational History   • Not on file.     Social History Main Topics   • Smoking status: Never Smoker   • Smokeless tobacco: Never Used   • Alcohol use No   • Drug use: No   • Sexual activity: Defer      Review of Systems   Constitutional: Negative for chills, diaphoresis, fever and unexpected weight change.   HENT: Negative for hearing loss, nosebleeds, sore throat and tinnitus.    Eyes: Negative for pain and visual disturbance.   Respiratory: Negative for cough, shortness of breath and wheezing.    Cardiovascular: Negative for chest pain and palpitations.   Gastrointestinal: Negative for abdominal pain, diarrhea, nausea and vomiting.   Endocrine: Negative for cold intolerance, heat intolerance and polydipsia.   Genitourinary: Negative for difficulty urinating, dysuria and hematuria.   Musculoskeletal: Positive for arthralgias. Negative for joint swelling and myalgias.   Skin: Negative for rash and wound.   Allergic/Immunologic: Negative for environmental allergies.   Neurological: Negative for dizziness, syncope and numbness.   Hematological: Does not bruise/bleed easily.   Psychiatric/Behavioral: Negative for dysphoric mood and sleep disturbance. The patient is not nervous/anxious.    All other systems reviewed and are negative.        Past Medical History:   Diagnosis Date   • Anxiety    • Depression    • Diabetes    • Hypertension    • Hypothyroidism     • OA (osteoarthritis)    • OAB (overactive bladder)    • AMELIA (obstructive sleep apnea)    • Stomach ulcer      Past Surgical History:   Procedure Laterality Date   •  SECTION      X2   • COLONOSCOPY N/A 2016    Procedure: COLONOSCOPY;  Surgeon: Raiza Talbot MD;  Location: [facility] OR;  Service:    • ENDOSCOPY N/A 2016    Procedure: ESOPHAGOGASTRODUODENOSCOPY with biopsies  and link test;  Surgeon: Raiza Talbot MD;  Location: [facility] OR;  Service:    • KNEE SURGERY Right    • LAPAROSCOPIC CHOLECYSTECTOMY       Family History   Problem Relation Age of Onset   • Hypertension Mother    • Stroke Mother    • Diabetes Mother    • Lung cancer Father    • Diabetes Sister    • Colon cancer Sister          Objective:  There were no vitals filed for this visit.  There were no vitals filed for this visit.  There is no height or weight on file to calculate BMI.       Ortho Exam  she is alert and oriented ×3.  The right knee shows no swelling effusion erythema and the skin is cool to touch.  She has 0-125° of motion with moderate pain and crepitus throughout the arc of motion but no instability at 0 90°.  No patella subluxation.  The skin is cool to touch.  There is no erythema.  Quad function is 5 over 5.  Calf is nontender negative Homans.  His been tolerating the anti-inflammatory which she does take an without any GI side effects.    Assessment:       1. Primary osteoarthritis of right knee          Plan: Reviewed the physical findings with the patient.  Recommend she take the over-the-counter anti-inflammatory to regular basis for at least the next 3-4 weeks which she had a good outcome following the injection last year so reinject with 8 cc lidocaine 1 cc Celestone as she is diabetic and that was accomplished through the superior lateral portal after sterile prep without complications tolerating it well.  Postinjection instructions given to the patient.  Return to see me as needed  Large Joint  Arthrocentesis  Date/Time: 11/30/2017 1:38 PM  Consent given by: patient  Site marked: site marked  Timeout: Immediately prior to procedure a time out was called to verify the correct patient, procedure, equipment, support staff and site/side marked as required   Supporting Documentation  Indications: pain   Procedure Details  Location: knee - R knee  Preparation: Patient was prepped and draped in the usual sterile fashion  Needle size: 22 G  Approach: superior  Medications administered: 8 mL lidocaine PF 1% 1 %; 6 mg betamethasone acetate-betamethasone sodium phosphate 6 (3-3) MG/ML  Patient tolerance: patient tolerated the procedure well with no immediate complications                  Work Status:    RICARDO query complete.    Orders:  Orders Placed This Encounter   Procedures   • Large Joint Arthrocentesis       Medications:  No orders of the defined types were placed in this encounter.      Followup:  Return if symptoms worsen or fail to improve.          Dragon transcription disclaimer     Much of this encounter note is an electronic transcription/translation of spoken language to printed text. The electronic translation of spoken language may permit erroneous, or at times, nonsensical words or phrases to be inadvertently transcribed. Although I have reviewed the note for such errors, some may still exist.

## 2019-10-31 ENCOUNTER — OFFICE VISIT (OUTPATIENT)
Dept: FAMILY MEDICINE CLINIC | Facility: CLINIC | Age: 59
End: 2019-10-31

## 2019-10-31 VITALS
WEIGHT: 179 LBS | BODY MASS INDEX: 30.56 KG/M2 | OXYGEN SATURATION: 95 % | SYSTOLIC BLOOD PRESSURE: 102 MMHG | HEIGHT: 64 IN | TEMPERATURE: 97.3 F | HEART RATE: 74 BPM | DIASTOLIC BLOOD PRESSURE: 70 MMHG

## 2019-10-31 DIAGNOSIS — H65.111 ACUTE ALLERGIC OTITIS MEDIA OF RIGHT EAR, RECURRENCE NOT SPECIFIED: Primary | ICD-10-CM

## 2019-10-31 DIAGNOSIS — R11.2 NAUSEA AND VOMITING, INTRACTABILITY OF VOMITING NOT SPECIFIED, UNSPECIFIED VOMITING TYPE: ICD-10-CM

## 2019-10-31 PROCEDURE — 99213 OFFICE O/P EST LOW 20 MIN: CPT | Performed by: NURSE PRACTITIONER

## 2019-10-31 RX ORDER — ONDANSETRON 4 MG/1
4 TABLET, ORALLY DISINTEGRATING ORAL EVERY 8 HOURS PRN
Qty: 12 TABLET | Refills: 2 | Status: SHIPPED | OUTPATIENT
Start: 2019-10-31 | End: 2019-12-23 | Stop reason: SDUPTHER

## 2019-10-31 RX ORDER — INFLUENZA A VIRUS A/MICHIGAN/45/2015 X-275 (H1N1) ANTIGEN (FORMALDEHYDE INACTIVATED), INFLUENZA A VIRUS A/SINGAPORE/INFIMH-16-0019/2016 IVR-186 (H3N2) ANTIGEN (FORMALDEHYDE INACTIVATED), INFLUENZA B VIRUS B/PHUKET/3073/2013 ANTIGEN (FORMALDEHYDE INACTIVATED), AND INFLUENZA B VIRUS B/MARYLAND/15/2016 BX-69A ANTIGEN (FORMALDEHYDE INACTIVATED) 15; 15; 15; 15 UG/.5ML; UG/.5ML; UG/.5ML; UG/.5ML
INJECTION, SUSPENSION INTRAMUSCULAR
Refills: 0 | COMMUNITY
Start: 2019-10-23 | End: 2020-07-20

## 2019-10-31 RX ORDER — CEPHALEXIN 500 MG/1
500 CAPSULE ORAL 3 TIMES DAILY
Refills: 0 | COMMUNITY
Start: 2019-10-24 | End: 2019-12-23

## 2019-10-31 RX ORDER — AMOXICILLIN AND CLAVULANATE POTASSIUM 875; 125 MG/1; MG/1
1 TABLET, FILM COATED ORAL 2 TIMES DAILY
Qty: 14 TABLET | Refills: 0 | Status: SHIPPED | OUTPATIENT
Start: 2019-10-31 | End: 2019-12-23

## 2019-10-31 RX ORDER — FLUTICASONE PROPIONATE 50 MCG
2 SPRAY, SUSPENSION (ML) NASAL DAILY
Refills: 11 | COMMUNITY
Start: 2019-10-14 | End: 2020-03-23 | Stop reason: SDUPTHER

## 2019-10-31 RX ORDER — TOLTERODINE 4 MG/1
4 CAPSULE, EXTENDED RELEASE ORAL DAILY
Refills: 2 | COMMUNITY
Start: 2019-10-23 | End: 2020-03-04

## 2019-10-31 NOTE — PROGRESS NOTES
Subjective   Violet Rahman is a 59 y.o. female.     Chief Complaint   Patient presents with   • Tinnitus     sounds like crickets        History of Present Illness     Patient is here today with c/o ringing of R ear and sounds and some pain at times for last week.     Hasn't taken anything over the counter    Does take flonase nasal spray and loratidine OTC    The following portions of the patient's history were reviewed and updated as appropriate: allergies, current medications, past family history, past medical history, past social history, past surgical history and problem list.    Past Medical History:   Diagnosis Date   • Anxiety    • Depression    • Diabetes (CMS/HCC)    • Hypertension    • Hypothyroidism    • OA (osteoarthritis)    • OAB (overactive bladder)    • AMELIA (obstructive sleep apnea)    • Stomach ulcer        Past Surgical History:   Procedure Laterality Date   •  SECTION      X2   • COLONOSCOPY N/A 2016    Procedure: COLONOSCOPY;  Surgeon: Raiza Talbot MD;  Location: [facility];  Service:    • ENDOSCOPY N/A 2016    Procedure: ESOPHAGOGASTRODUODENOSCOPY with biopsies  and link test;  Surgeon: Raiza Talbot MD;  Location: [facility];  Service:    • KNEE SURGERY Right    • LAPAROSCOPIC CHOLECYSTECTOMY         Family History   Problem Relation Age of Onset   • Hypertension Mother    • Stroke Mother    • Diabetes Mother    • Lung cancer Father    • Diabetes Sister    • Colon cancer Sister        Social History     Socioeconomic History   • Marital status: Single     Spouse name: Not on file   • Number of children: Not on file   • Years of education: Not on file   • Highest education level: Not on file   Tobacco Use   • Smoking status: Never Smoker   • Smokeless tobacco: Never Used   Substance and Sexual Activity   • Alcohol use: No   • Drug use: No   • Sexual activity: Defer         Current Outpatient Medications:   •  ATENOLOL PO, Take 25 mg by mouth Daily., Disp: , Rfl:   •   atorvastatin (LIPITOR) 10 MG tablet, Take 80 mg by mouth Daily., Disp: , Rfl:   •  BREO ELLIPTA 100-25 MCG/INH inhaler, Inhale 1 puff Daily. Rinse mouth after each use, Disp: , Rfl: 11  •  cephalexin (KEFLEX) 500 MG capsule, Take 500 mg by mouth 3 (Three) Times a Day., Disp: , Rfl: 0  •  famotidine (PEPCID) 40 MG tablet, Take 1 tablet by mouth Every Night., Disp: 90 tablet, Rfl: 0  •  ferrous sulfate 325 (65 FE) MG tablet, Take 325 mg by mouth Daily With Breakfast., Disp: , Rfl:   •  fluticasone (FLONASE) 50 MCG/ACT nasal spray, 2 sprays Daily., Disp: , Rfl: 11  •  FLUZONE QUADRIVALENT 0.5 ML suspension prefilled syringe injection, PHARMACIST ADMINISTERED IMMUNIZATION ADMINISTERED AT TIME OF DISPENSING, Disp: , Rfl: 0  •  glipiZIDE (GLUCOTROL) 10 MG tablet, Take 10 mg by mouth 2 (Two) Times a Day Before Meals., Disp: , Rfl:   •  HYDROcodone-acetaminophen (NORCO)  MG per tablet, Take 1 tablet by mouth Every 6 (Six) Hours As Needed for moderate pain (4-6)., Disp: , Rfl:   •  levothyroxine (SYNTHROID, LEVOTHROID) 50 MCG tablet, Take 75 mcg by mouth Daily. Take one tablet by mouth once daily in the morning, Disp: , Rfl:   •  loratadine (CLARITIN) 10 MG tablet, Take 1 tablet by mouth Daily., Disp: 30 tablet, Rfl: 0  •  metFORMIN (GLUCOPHAGE) 500 MG tablet, Take 1,000 mg by mouth 2 (Two) Times a Day., Disp: , Rfl:   •  ondansetron ODT (ZOFRAN-ODT) 4 MG disintegrating tablet, Take 1 tablet by mouth Every 8 (Eight) Hours As Needed for Nausea or Vomiting., Disp: 12 tablet, Rfl: 2  •  PROAIR  (90 Base) MCG/ACT inhaler, Inhale See Admin Instructions. Inhale 2 puffs by mouth every 4 to 6 hours as needed, Disp: , Rfl: 3  •  tolterodine LA (DETROL LA) 4 MG 24 hr capsule, Take 4 mg by mouth Daily., Disp: , Rfl: 2  •  triamcinolone (KENALOG) 0.1 % cream, Apply  topically 3 (Three) Times a Day., Disp: 45 g, Rfl: 0  •  zolpidem (AMBIEN) 5 MG tablet, Take 10 mg by mouth At Night As Needed for Sleep., Disp: , Rfl:   •   "amoxicillin-clavulanate (AUGMENTIN) 875-125 MG per tablet, Take 1 tablet by mouth 2 (Two) Times a Day., Disp: 14 tablet, Rfl: 0    Review of Systems   Constitutional: Positive for fatigue. Negative for fever.   HENT: Positive for ear pain (R ear) and tinnitus (R ear). Negative for congestion, rhinorrhea, sinus pressure and sore throat.    Respiratory: Negative for cough, shortness of breath and wheezing.    Cardiovascular: Negative for chest pain.   Neurological: Positive for dizziness and headache.       Objective   Vitals:    10/31/19 1206   BP: 102/70   Pulse: 74   Temp: 97.3 °F (36.3 °C)   SpO2: 95%   Weight: 81.2 kg (179 lb)   Height: 162.6 cm (64\")     Body mass index is 30.73 kg/m².  Physical Exam   Constitutional: She appears well-developed and well-nourished.   HENT:   Head: Normocephalic and atraumatic.   Right Ear: Tympanic membrane is erythematous and bulging. Tympanic membrane mobility is abnormal.   Left Ear: Tympanic membrane mobility is abnormal.   Nose: Nose normal. Right sinus exhibits no maxillary sinus tenderness and no frontal sinus tenderness. Left sinus exhibits no maxillary sinus tenderness and no frontal sinus tenderness.   Mouth/Throat: Uvula is midline and oropharynx is clear and moist.   Neck:   Tenderness with R side under ear and extending to neck   Cardiovascular: Normal rate, regular rhythm and normal heart sounds.   Pulmonary/Chest: Effort normal and breath sounds normal.         Assessment/Plan   Violet was seen today for tinnitus.    Diagnoses and all orders for this visit:    Acute allergic otitis media of right ear, recurrence not specified    Nausea and vomiting, intractability of vomiting not specified, unspecified vomiting type  -     ondansetron ODT (ZOFRAN-ODT) 4 MG disintegrating tablet; Take 1 tablet by mouth Every 8 (Eight) Hours As Needed for Nausea or Vomiting.    Other orders  -     Cancel: Drug Profile [id number] - Urine, Clean Catch; Future  -     amoxicillin-clavulanate " (AUGMENTIN) 875-125 MG per tablet; Take 1 tablet by mouth 2 (Two) Times a Day.                 Patient Instructions   Otitis Media, Adult    Otitis media means that the middle ear is red and swollen (inflamed) and full of fluid. The condition usually goes away on its own.  Follow these instructions at home:  · Take over-the-counter and prescription medicines only as told by your doctor.  · If you were prescribed an antibiotic medicine, take it as told by your doctor. Do not stop taking the antibiotic even if you start to feel better.  · Keep all follow-up visits as told by your doctor. This is important.  Contact a doctor if:  · You have bleeding from your nose.  · There is a lump on your neck.  · You are not getting better in 5 days.  · You feel worse instead of better.  Get help right away if:  · You have pain that is not helped with medicine.  · You have swelling, redness, or pain around your ear.  · You get a stiff neck.  · You cannot move part of your face (paralyzed).  · You notice that the bone behind your ear hurts when you touch it.  · You get a very bad headache.  Summary  · Otitis media means that the middle ear is red, swollen, and full of fluid.  · This condition usually goes away on its own. In some cases, treatment may be needed.  · If you were prescribed an antibiotic medicine, take it as told by your doctor.  This information is not intended to replace advice given to you by your health care provider. Make sure you discuss any questions you have with your health care provider.  Document Released: 06/05/2009 Document Revised: 01/08/2018 Document Reviewed: 01/08/2018  Elsevier Interactive Patient Education © 2019 [employer]

## 2019-10-31 NOTE — PATIENT INSTRUCTIONS
Otitis Media, Adult    Otitis media means that the middle ear is red and swollen (inflamed) and full of fluid. The condition usually goes away on its own.  Follow these instructions at home:  · Take over-the-counter and prescription medicines only as told by your doctor.  · If you were prescribed an antibiotic medicine, take it as told by your doctor. Do not stop taking the antibiotic even if you start to feel better.  · Keep all follow-up visits as told by your doctor. This is important.  Contact a doctor if:  · You have bleeding from your nose.  · There is a lump on your neck.  · You are not getting better in 5 days.  · You feel worse instead of better.  Get help right away if:  · You have pain that is not helped with medicine.  · You have swelling, redness, or pain around your ear.  · You get a stiff neck.  · You cannot move part of your face (paralyzed).  · You notice that the bone behind your ear hurts when you touch it.  · You get a very bad headache.  Summary  · Otitis media means that the middle ear is red, swollen, and full of fluid.  · This condition usually goes away on its own. In some cases, treatment may be needed.  · If you were prescribed an antibiotic medicine, take it as told by your doctor.  This information is not intended to replace advice given to you by your health care provider. Make sure you discuss any questions you have with your health care provider.  Document Released: 06/05/2009 Document Revised: 01/08/2018 Document Reviewed: 01/08/2018  [employer] Interactive Patient Education © 2019 [employer]

## 2019-12-23 ENCOUNTER — OFFICE VISIT (OUTPATIENT)
Dept: FAMILY MEDICINE CLINIC | Facility: CLINIC | Age: 59
End: 2019-12-23

## 2019-12-23 VITALS
TEMPERATURE: 98 F | BODY MASS INDEX: 31.24 KG/M2 | HEART RATE: 68 BPM | DIASTOLIC BLOOD PRESSURE: 76 MMHG | OXYGEN SATURATION: 96 % | SYSTOLIC BLOOD PRESSURE: 126 MMHG | HEIGHT: 64 IN | WEIGHT: 183 LBS

## 2019-12-23 DIAGNOSIS — J01.00 ACUTE NON-RECURRENT MAXILLARY SINUSITIS: ICD-10-CM

## 2019-12-23 DIAGNOSIS — G47.09 OTHER INSOMNIA: ICD-10-CM

## 2019-12-23 DIAGNOSIS — E11.9 TYPE 2 DIABETES MELLITUS WITHOUT COMPLICATION, WITHOUT LONG-TERM CURRENT USE OF INSULIN (HCC): Primary | ICD-10-CM

## 2019-12-23 DIAGNOSIS — R11.2 NAUSEA AND VOMITING, INTRACTABILITY OF VOMITING NOT SPECIFIED, UNSPECIFIED VOMITING TYPE: ICD-10-CM

## 2019-12-23 DIAGNOSIS — Z79.899 LONG-TERM USE OF HIGH-RISK MEDICATION: ICD-10-CM

## 2019-12-23 DIAGNOSIS — J40 BRONCHITIS: ICD-10-CM

## 2019-12-23 LAB — HBA1C MFR BLD: 6.7 %

## 2019-12-23 PROCEDURE — 83036 HEMOGLOBIN GLYCOSYLATED A1C: CPT | Performed by: NURSE PRACTITIONER

## 2019-12-23 PROCEDURE — 99214 OFFICE O/P EST MOD 30 MIN: CPT | Performed by: NURSE PRACTITIONER

## 2019-12-23 RX ORDER — ERGOCALCIFEROL 1.25 MG/1
CAPSULE ORAL
COMMUNITY
Start: 2019-11-20 | End: 2020-01-17

## 2019-12-23 RX ORDER — AMOXICILLIN AND CLAVULANATE POTASSIUM 875; 125 MG/1; MG/1
1 TABLET, FILM COATED ORAL 2 TIMES DAILY
Qty: 14 TABLET | Refills: 0 | Status: SHIPPED | OUTPATIENT
Start: 2019-12-23 | End: 2020-01-17

## 2019-12-23 RX ORDER — ZOLPIDEM TARTRATE 5 MG/1
10 TABLET ORAL NIGHTLY PRN
Qty: 60 TABLET | Refills: 2 | Status: SHIPPED | OUTPATIENT
Start: 2019-12-23 | End: 2019-12-27 | Stop reason: SDUPTHER

## 2019-12-23 RX ORDER — ONDANSETRON 4 MG/1
4 TABLET, ORALLY DISINTEGRATING ORAL EVERY 8 HOURS PRN
Qty: 12 TABLET | Refills: 2 | Status: SHIPPED | OUTPATIENT
Start: 2019-12-23 | End: 2020-09-28

## 2019-12-23 RX ORDER — LEVOTHYROXINE SODIUM 0.12 MG/1
125 TABLET ORAL DAILY
COMMUNITY
Start: 2019-12-02 | End: 2020-01-02 | Stop reason: SDUPTHER

## 2019-12-23 RX ORDER — PREDNISONE 20 MG/1
TABLET ORAL
Qty: 9 TABLET | Refills: 0 | Status: SHIPPED | OUTPATIENT
Start: 2019-12-23 | End: 2019-12-29

## 2019-12-23 NOTE — PATIENT INSTRUCTIONS
Sinusitis, Adult  Sinusitis is soreness and swelling (inflammation) of your sinuses. Sinuses are hollow spaces in the bones around your face. They are located:  · Around your eyes.  · In the middle of your forehead.  · Behind your nose.  · In your cheekbones.  Your sinuses and nasal passages are lined with a fluid called mucus. Mucus drains out of your sinuses. Swelling can trap mucus in your sinuses. This lets germs (bacteria, virus, or fungus) grow, which leads to infection. Most of the time, this condition is caused by a virus.  What are the causes?  This condition is caused by:  · Allergies.  · Asthma.  · Germs.  · Things that block your nose or sinuses.  · Growths in the nose (nasal polyps).  · Chemicals or irritants in the air.  · Fungus (rare).  What increases the risk?  You are more likely to develop this condition if:  · You have a weak body defense system (immune system).  · You do a lot of swimming or diving.  · You use nasal sprays too much.  · You smoke.  What are the signs or symptoms?  The main symptoms of this condition are pain and a feeling of pressure around the sinuses. Other symptoms include:  · Stuffy nose (congestion).  · Runny nose (drainage).  · Swelling and warmth in the sinuses.  · Headache.  · Toothache.  · A cough that may get worse at night.  · Mucus that collects in the throat or the back of the nose (postnasal drip).  · Being unable to smell and taste.  · Being very tired (fatigue).  · A fever.  · Sore throat.  · Bad breath.  How is this diagnosed?  This condition is diagnosed based on:  · Your symptoms.  · Your medical history.  · A physical exam.  · Tests to find out if your condition is short-term (acute) or long-term (chronic). Your doctor may:  ? Check your nose for growths (polyps).  ? Check your sinuses using a tool that has a light (endoscope).  ? Check for allergies or germs.  ? Do imaging tests, such as an MRI or CT scan.  How is this treated?  Treatment for this condition  depends on the cause and whether it is short-term or long-term.  · If caused by a virus, your symptoms should go away on their own within 10 days. You may be given medicines to relieve symptoms. They include:  ? Medicines that shrink swollen tissue in the nose.  ? Medicines that treat allergies (antihistamines).  ? A spray that treats swelling of the nostrils.   ? Rinses that help get rid of thick mucus in your nose (nasal saline washes).  · If caused by bacteria, your doctor may wait to see if you will get better without treatment. You may be given antibiotic medicine if you have:  ? A very bad infection.  ? A weak body defense system.  · If caused by growths in the nose, you may need to have surgery.  Follow these instructions at home:  Medicines  · Take, use, or apply over-the-counter and prescription medicines only as told by your doctor. These may include nasal sprays.  · If you were prescribed an antibiotic medicine, take it as told by your doctor. Do not stop taking the antibiotic even if you start to feel better.  Hydrate and humidify    · Drink enough water to keep your pee (urine) pale yellow.  · Use a cool mist humidifier to keep the humidity level in your home above 50%.  · Breathe in steam for 10-15 minutes, 3-4 times a day, or as told by your doctor. You can do this in the bathroom while a hot shower is running.  · Try not to spend time in cool or dry air.  Rest  · Rest as much as you can.  · Sleep with your head raised (elevated).  · Make sure you get enough sleep each night.  General instructions    · Put a warm, moist washcloth on your face 3-4 times a day, or as often as told by your doctor. This will help with discomfort.  · Wash your hands often with soap and water. If there is no soap and water, use hand .  · Do not smoke. Avoid being around people who are smoking (secondhand smoke).  · Keep all follow-up visits as told by your doctor. This is important.  Contact a doctor if:  · You  have a fever.  · Your symptoms get worse.  · Your symptoms do not get better within 10 days.  Get help right away if:  · You have a very bad headache.  · You cannot stop throwing up (vomiting).  · You have very bad pain or swelling around your face or eyes.  · You have trouble seeing.  · You feel confused.  · Your neck is stiff.  · You have trouble breathing.  Summary  · Sinusitis is swelling of your sinuses. Sinuses are hollow spaces in the bones around your face.  · This condition is caused by tissues in your nose that become inflamed or swollen. This traps germs. These can lead to infection.  · If you were prescribed an antibiotic medicine, take it as told by your doctor. Do not stop taking it even if you start to feel better.  · Keep all follow-up visits as told by your doctor. This is important.  This information is not intended to replace advice given to you by your health care provider. Make sure you discuss any questions you have with your health care provider.  Document Released: 06/05/2009 Document Revised: 05/20/2019 Document Reviewed: 05/20/2019  [employer] Interactive Patient Education © 2019 [employer]        Acute Bronchitis, Adult  Acute bronchitis is when air tubes (bronchi) in the lungs suddenly get swollen. The condition can make it hard to breathe. It can also cause these symptoms:  · A cough.  · Coughing up clear, yellow, or green mucus.  · Wheezing.  · Chest congestion.  · Shortness of breath.  · A fever.  · Body aches.  · Chills.  · A sore throat.  Follow these instructions at home:    Medicines  · Take over-the-counter and prescription medicines only as told by your doctor.  · If you were prescribed an antibiotic medicine, take it as told by your doctor. Do not stop taking the antibiotic even if you start to feel better.  General instructions  · Rest.  · Drink enough fluids to keep your pee (urine) pale yellow.  · Avoid smoking and secondhand smoke. If you smoke and you need help quitting,  ask your doctor. Quitting will help your lungs heal faster.  · Use an inhaler, cool mist vaporizer, or humidifier as told by your doctor.  · Keep all follow-up visits as told by your doctor. This is important.  How is this prevented?  To lower your risk of getting this condition again:  · Wash your hands often with soap and water. If you cannot use soap and water, use hand .  · Avoid contact with people who have cold symptoms.  · Try not to touch your hands to your mouth, nose, or eyes.  · Make sure to get the flu shot every year.  Contact a doctor if:  · Your symptoms do not get better in 2 weeks.  Get help right away if:  · You cough up blood.  · You have chest pain.  · You have very bad shortness of breath.  · You become dehydrated.  · You faint (pass out) or keep feeling like you are going to pass out.  · You keep throwing up (vomiting).  · You have a very bad headache.  · Your fever or chills gets worse.  This information is not intended to replace advice given to you by your health care provider. Make sure you discuss any questions you have with your health care provider.  Document Released: 06/05/2009 Document Revised: 08/01/2018 Document Reviewed: 06/07/2017  [employer] Interactive Patient Education © 2019 [employer]

## 2019-12-23 NOTE — PROGRESS NOTES
Subjective   Violet Rahman is a 59 y.o. female.     Chief Complaint   Patient presents with   • Sinusitis        History of Present Illness       Patient is here today with c/o sinus pressure, chest burning, and hurting.    Denies any fever that she knows of.      OTC: headache medicine  Hasn't been taking flonase because it dries hour mouth out too much    A1c is 6.7   Diabetes well controlled.    INSOMNIA: needs refills on ambien  arvin-today  UDS-today consent 10/31      The following portions of the patient's history were reviewed and updated as appropriate: allergies, current medications, past family history, past medical history, past social history, past surgical history and problem list.    Past Medical History:   Diagnosis Date   • Anxiety    • Depression    • Diabetes (CMS/HCC)    • Hypertension    • Hypothyroidism    • OA (osteoarthritis)    • OAB (overactive bladder)    • AMELIA (obstructive sleep apnea)    • Stomach ulcer        Past Surgical History:   Procedure Laterality Date   •  SECTION      X2   • COLONOSCOPY N/A 2016    Procedure: COLONOSCOPY;  Surgeon: Raiza Talbot MD;  Location: [facility];  Service:    • ENDOSCOPY N/A 2016    Procedure: ESOPHAGOGASTRODUODENOSCOPY with biopsies  and link test;  Surgeon: Raiza Talbot MD;  Location: [facility] OR;  Service:    • KNEE SURGERY Right    • LAPAROSCOPIC CHOLECYSTECTOMY         Family History   Problem Relation Age of Onset   • Hypertension Mother    • Stroke Mother    • Diabetes Mother    • Lung cancer Father    • Diabetes Sister    • Colon cancer Sister        Social History     Socioeconomic History   • Marital status: Single     Spouse name: Not on file   • Number of children: Not on file   • Years of education: Not on file   • Highest education level: Not on file   Tobacco Use   • Smoking status: Never Smoker   • Smokeless tobacco: Never Used   Substance and Sexual Activity   • Alcohol use: No   • Drug use: No   • Sexual  activity: Defer         Current Outpatient Medications:   •  ATENOLOL PO, Take 25 mg by mouth Daily., Disp: , Rfl:   •  BREO ELLIPTA 100-25 MCG/INH inhaler, Inhale 1 puff Daily. Rinse mouth after each use, Disp: , Rfl: 11  •  ferrous sulfate 325 (65 FE) MG tablet, Take 325 mg by mouth Daily With Breakfast., Disp: , Rfl:   •  fluticasone (FLONASE) 50 MCG/ACT nasal spray, 2 sprays Daily., Disp: , Rfl: 11  •  FLUZONE QUADRIVALENT 0.5 ML suspension prefilled syringe injection, PHARMACIST ADMINISTERED IMMUNIZATION ADMINISTERED AT TIME OF DISPENSING, Disp: , Rfl: 0  •  glipiZIDE (GLUCOTROL) 10 MG tablet, Take 10 mg by mouth 2 (Two) Times a Day Before Meals., Disp: , Rfl:   •  HYDROcodone-acetaminophen (NORCO)  MG per tablet, Take 1 tablet by mouth Every 6 (Six) Hours As Needed for moderate pain (4-6)., Disp: , Rfl:   •  levothyroxine (SYNTHROID, LEVOTHROID) 125 MCG tablet, Take 125 mcg by mouth Daily., Disp: , Rfl:   •  metFORMIN (GLUCOPHAGE) 1000 MG tablet, , Disp: , Rfl:   •  ondansetron ODT (ZOFRAN-ODT) 4 MG disintegrating tablet, Take 1 tablet by mouth Every 8 (Eight) Hours As Needed for Nausea or Vomiting., Disp: 12 tablet, Rfl: 2  •  PROAIR  (90 Base) MCG/ACT inhaler, Inhale See Admin Instructions. Inhale 2 puffs by mouth every 4 to 6 hours as needed, Disp: , Rfl: 3  •  tolterodine LA (DETROL LA) 4 MG 24 hr capsule, Take 4 mg by mouth Daily., Disp: , Rfl: 2  •  vitamin D (ERGOCALCIFEROL) 1.25 MG (72305 UT) capsule capsule, , Disp: , Rfl:   •  zolpidem (AMBIEN) 5 MG tablet, Take 2 tablets by mouth At Night As Needed for Sleep., Disp: 60 tablet, Rfl: 2  •  amoxicillin-clavulanate (AUGMENTIN) 875-125 MG per tablet, Take 1 tablet by mouth 2 (Two) Times a Day., Disp: 14 tablet, Rfl: 0  •  predniSONE (DELTASONE) 20 MG tablet, Take 1 tablet by mouth 2 (Two) Times a Day for 3 days, THEN 1 tablet Daily for 3 days., Disp: 9 tablet, Rfl: 0    Review of Systems   Constitutional: Negative for fatigue and fever.  "  HENT: Positive for congestion, rhinorrhea and sinus pressure. Negative for ear pain and sore throat.    Respiratory: Positive for cough and chest tightness. Negative for shortness of breath and wheezing.    Cardiovascular: Negative for chest pain.   Gastrointestinal: Negative for abdominal pain, constipation, diarrhea, nausea and vomiting.   Genitourinary: Negative for dysuria and urgency.   Psychiatric/Behavioral: Positive for sleep disturbance.       Objective   Vitals:    12/23/19 1054   BP: 126/76   Pulse: 68   Temp: 98 °F (36.7 °C)   SpO2: 96%   Weight: 83 kg (183 lb)   Height: 162.6 cm (64\")     Body mass index is 31.41 kg/m².  Physical Exam   Constitutional: She is oriented to person, place, and time. She appears well-developed and well-nourished.   HENT:   Head: Normocephalic and atraumatic.   Right Ear: Tympanic membrane mobility is abnormal.   Left Ear: Tympanic membrane mobility is abnormal.   Nose: Rhinorrhea present. Right sinus exhibits maxillary sinus tenderness. Right sinus exhibits no frontal sinus tenderness. Left sinus exhibits maxillary sinus tenderness. Left sinus exhibits no frontal sinus tenderness.   Mouth/Throat: Uvula is midline, oropharynx is clear and moist and mucous membranes are normal.   Cardiovascular: Normal rate, regular rhythm and normal heart sounds.   Pulmonary/Chest: Effort normal and breath sounds normal.   Neurological: She is alert and oriented to person, place, and time.         Assessment/Plan   Violet was seen today for sinusitis.    Diagnoses and all orders for this visit:    Type 2 diabetes mellitus without complication, without long-term current use of insulin (CMS/[facility])  -     POC Glycosylated Hemoglobin (Hb A1C)    Acute non-recurrent maxillary sinusitis    Bronchitis    Nausea and vomiting, intractability of vomiting not specified, unspecified vomiting type  -     ondansetron ODT (ZOFRAN-ODT) 4 MG disintegrating tablet; Take 1 tablet by mouth Every 8 (Eight) Hours " As Needed for Nausea or Vomiting.    Other insomnia  -     zolpidem (AMBIEN) 5 MG tablet; Take 2 tablets by mouth At Night As Needed for Sleep.  -     Drug Profile [id number] - Urine, Clean Catch    Long-term use of high-risk medication  -     zolpidem (AMBIEN) 5 MG tablet; Take 2 tablets by mouth At Night As Needed for Sleep.  -     Drug Profile [id number] - Urine, Clean Catch    Other orders  -     amoxicillin-clavulanate (AUGMENTIN) 875-125 MG per tablet; Take 1 tablet by mouth 2 (Two) Times a Day.  -     predniSONE (DELTASONE) 20 MG tablet; Take 1 tablet by mouth 2 (Two) Times a Day for 3 days, THEN 1 tablet Daily for 3 days.                 Patient Instructions   Sinusitis, Adult  Sinusitis is soreness and swelling (inflammation) of your sinuses. Sinuses are hollow spaces in the bones around your face. They are located:  · Around your eyes.  · In the middle of your forehead.  · Behind your nose.  · In your cheekbones.  Your sinuses and nasal passages are lined with a fluid called mucus. Mucus drains out of your sinuses. Swelling can trap mucus in your sinuses. This lets germs (bacteria, virus, or fungus) grow, which leads to infection. Most of the time, this condition is caused by a virus.  What are the causes?  This condition is caused by:  · Allergies.  · Asthma.  · Germs.  · Things that block your nose or sinuses.  · Growths in the nose (nasal polyps).  · Chemicals or irritants in the air.  · Fungus (rare).  What increases the risk?  You are more likely to develop this condition if:  · You have a weak body defense system (immune system).  · You do a lot of swimming or diving.  · You use nasal sprays too much.  · You smoke.  What are the signs or symptoms?  The main symptoms of this condition are pain and a feeling of pressure around the sinuses. Other symptoms include:  · Stuffy nose (congestion).  · Runny nose (drainage).  · Swelling and warmth in the sinuses.  · Headache.  · Toothache.  · A cough that may get  worse at night.  · Mucus that collects in the throat or the back of the nose (postnasal drip).  · Being unable to smell and taste.  · Being very tired (fatigue).  · A fever.  · Sore throat.  · Bad breath.  How is this diagnosed?  This condition is diagnosed based on:  · Your symptoms.  · Your medical history.  · A physical exam.  · Tests to find out if your condition is short-term (acute) or long-term (chronic). Your doctor may:  ? Check your nose for growths (polyps).  ? Check your sinuses using a tool that has a light (endoscope).  ? Check for allergies or germs.  ? Do imaging tests, such as an MRI or CT scan.  How is this treated?  Treatment for this condition depends on the cause and whether it is short-term or long-term.  · If caused by a virus, your symptoms should go away on their own within 10 days. You may be given medicines to relieve symptoms. They include:  ? Medicines that shrink swollen tissue in the nose.  ? Medicines that treat allergies (antihistamines).  ? A spray that treats swelling of the nostrils.   ? Rinses that help get rid of thick mucus in your nose (nasal saline washes).  · If caused by bacteria, your doctor may wait to see if you will get better without treatment. You may be given antibiotic medicine if you have:  ? A very bad infection.  ? A weak body defense system.  · If caused by growths in the nose, you may need to have surgery.  Follow these instructions at home:  Medicines  · Take, use, or apply over-the-counter and prescription medicines only as told by your doctor. These may include nasal sprays.  · If you were prescribed an antibiotic medicine, take it as told by your doctor. Do not stop taking the antibiotic even if you start to feel better.  Hydrate and humidify    · Drink enough water to keep your pee (urine) pale yellow.  · Use a cool mist humidifier to keep the humidity level in your home above 50%.  · Breathe in steam for 10-15 minutes, 3-4 times a day, or as told by your  doctor. You can do this in the bathroom while a hot shower is running.  · Try not to spend time in cool or dry air.  Rest  · Rest as much as you can.  · Sleep with your head raised (elevated).  · Make sure you get enough sleep each night.  General instructions    · Put a warm, moist washcloth on your face 3-4 times a day, or as often as told by your doctor. This will help with discomfort.  · Wash your hands often with soap and water. If there is no soap and water, use hand .  · Do not smoke. Avoid being around people who are smoking (secondhand smoke).  · Keep all follow-up visits as told by your doctor. This is important.  Contact a doctor if:  · You have a fever.  · Your symptoms get worse.  · Your symptoms do not get better within 10 days.  Get help right away if:  · You have a very bad headache.  · You cannot stop throwing up (vomiting).  · You have very bad pain or swelling around your face or eyes.  · You have trouble seeing.  · You feel confused.  · Your neck is stiff.  · You have trouble breathing.  Summary  · Sinusitis is swelling of your sinuses. Sinuses are hollow spaces in the bones around your face.  · This condition is caused by tissues in your nose that become inflamed or swollen. This traps germs. These can lead to infection.  · If you were prescribed an antibiotic medicine, take it as told by your doctor. Do not stop taking it even if you start to feel better.  · Keep all follow-up visits as told by your doctor. This is important.  This information is not intended to replace advice given to you by your health care provider. Make sure you discuss any questions you have with your health care provider.  Document Released: 06/05/2009 Document Revised: 05/20/2019 Document Reviewed: 05/20/2019  [employer] Interactive Patient Education © 2019 [employer]        Acute Bronchitis, Adult  Acute bronchitis is when air tubes (bronchi) in the lungs suddenly get swollen. The condition can make it hard to  breathe. It can also cause these symptoms:  · A cough.  · Coughing up clear, yellow, or green mucus.  · Wheezing.  · Chest congestion.  · Shortness of breath.  · A fever.  · Body aches.  · Chills.  · A sore throat.  Follow these instructions at home:    Medicines  · Take over-the-counter and prescription medicines only as told by your doctor.  · If you were prescribed an antibiotic medicine, take it as told by your doctor. Do not stop taking the antibiotic even if you start to feel better.  General instructions  · Rest.  · Drink enough fluids to keep your pee (urine) pale yellow.  · Avoid smoking and secondhand smoke. If you smoke and you need help quitting, ask your doctor. Quitting will help your lungs heal faster.  · Use an inhaler, cool mist vaporizer, or humidifier as told by your doctor.  · Keep all follow-up visits as told by your doctor. This is important.  How is this prevented?  To lower your risk of getting this condition again:  · Wash your hands often with soap and water. If you cannot use soap and water, use hand .  · Avoid contact with people who have cold symptoms.  · Try not to touch your hands to your mouth, nose, or eyes.  · Make sure to get the flu shot every year.  Contact a doctor if:  · Your symptoms do not get better in 2 weeks.  Get help right away if:  · You cough up blood.  · You have chest pain.  · You have very bad shortness of breath.  · You become dehydrated.  · You faint (pass out) or keep feeling like you are going to pass out.  · You keep throwing up (vomiting).  · You have a very bad headache.  · Your fever or chills gets worse.  This information is not intended to replace advice given to you by your health care provider. Make sure you discuss any questions you have with your health care provider.  Document Released: 06/05/2009 Document Revised: 08/01/2018 Document Reviewed: 06/07/2017  [employer] Interactive Patient Education © 2019 [employer]

## 2019-12-27 ENCOUNTER — TELEPHONE (OUTPATIENT)
Dept: FAMILY MEDICINE CLINIC | Facility: CLINIC | Age: 59
End: 2019-12-27

## 2019-12-27 DIAGNOSIS — Z79.899 LONG-TERM USE OF HIGH-RISK MEDICATION: ICD-10-CM

## 2019-12-27 DIAGNOSIS — G47.09 OTHER INSOMNIA: ICD-10-CM

## 2019-12-27 RX ORDER — ZOLPIDEM TARTRATE 5 MG/1
5 TABLET ORAL NIGHTLY PRN
Qty: 30 TABLET | Refills: 2 | Status: SHIPPED | OUTPATIENT
Start: 2019-12-27 | End: 2019-12-30

## 2019-12-27 NOTE — TELEPHONE ENCOUNTER
PATIENT STATED INSURANCE WILL COVER AMBEIN 1 PILL PER DAY INSTEAD OF 2 PILLS PER.    [facility]     PLEASE CALL [phone] WITH ANY QUESTIONS    THANKS!

## 2019-12-29 LAB
AMPHETAMINES UR QL SCN: NEGATIVE NG/ML
BARBITURATES UR QL: NEGATIVE NG/ML
BENZODIAZ UR QL SCN: NEGATIVE NG/ML
BZE UR QL: NEGATIVE NG/ML
CANNABINOIDS UR QL SCN: NEGATIVE NG/ML
CREAT UR-MCNC: 109.1 MG/DL (ref 20–300)
ETHANOL UR-MCNC: NEGATIVE %
MEPERIDINE UR QL: NEGATIVE NG/ML
METHADONE UR QL: NEGATIVE NG/ML
OPIATES UR QL SCN: NEGATIVE NG/ML
OXYCODONE+OXYMORPHONE UR QL SCN: NEGATIVE NG/ML
PCP UR QL: NEGATIVE NG/ML
PROPOXYPH UR QL: NEGATIVE NG/ML
TRAMADOL UR QL SCN: NEGATIVE NG/ML

## 2019-12-30 ENCOUNTER — TELEPHONE (OUTPATIENT)
Dept: FAMILY MEDICINE CLINIC | Facility: CLINIC | Age: 59
End: 2019-12-30

## 2019-12-30 DIAGNOSIS — E11.9 TYPE 2 DIABETES MELLITUS WITHOUT COMPLICATION, WITHOUT LONG-TERM CURRENT USE OF INSULIN (HCC): ICD-10-CM

## 2019-12-30 DIAGNOSIS — Z79.899 LONG-TERM USE OF HIGH-RISK MEDICATION: ICD-10-CM

## 2019-12-30 DIAGNOSIS — G47.09 OTHER INSOMNIA: ICD-10-CM

## 2019-12-30 DIAGNOSIS — Z79.899 LONG-TERM USE OF HIGH-RISK MEDICATION: Primary | ICD-10-CM

## 2019-12-30 DIAGNOSIS — K21.9 GASTROESOPHAGEAL REFLUX DISEASE, ESOPHAGITIS PRESENCE NOT SPECIFIED: Primary | ICD-10-CM

## 2019-12-30 RX ORDER — ZOLPIDEM TARTRATE 10 MG/1
10 TABLET ORAL NIGHTLY PRN
Qty: 30 TABLET | Refills: 2 | Status: SHIPPED | OUTPATIENT
Start: 2019-12-30 | End: 2020-03-23

## 2019-12-30 NOTE — TELEPHONE ENCOUNTER
Pt came in for labs and said that last month she had ambien 10 mg.  This month she had ambien 5 mg called in, she said she took 2 of the 5mg last night and they didn't work. Please advise

## 2019-12-30 NOTE — TELEPHONE ENCOUNTER
Left message for pt that her 10 mg aof ambien had been called in and that she may have to pay out of pocket for it.  Also called pharmacy and cancelled refills for 5 mg of ambien

## 2019-12-30 NOTE — TELEPHONE ENCOUNTER
Please advise patient that I have called in 10 mg dosing but I am unsure if her insurance will cover.  Also please call [facility] pharmacy and cancel out any refills for the 5 mg dosing.

## 2019-12-31 LAB
ALBUMIN SERPL-MCNC: 4.5 G/DL (ref 3.5–5.2)
ALBUMIN/GLOB SERPL: 2 G/DL
ALP SERPL-CCNC: 68 U/L (ref 39–117)
ALT SERPL-CCNC: 23 U/L (ref 1–33)
AST SERPL-CCNC: 17 U/L (ref 1–32)
BASOPHILS # BLD AUTO: 0.03 10*3/MM3 (ref 0–0.2)
BASOPHILS NFR BLD AUTO: 0.6 % (ref 0–1.5)
BILIRUB SERPL-MCNC: 0.4 MG/DL (ref 0.2–1.2)
BUN SERPL-MCNC: 7 MG/DL (ref 6–20)
BUN/CREAT SERPL: 10.6 (ref 7–25)
CALCIUM SERPL-MCNC: 9.5 MG/DL (ref 8.6–10.5)
CHLORIDE SERPL-SCNC: 104 MMOL/L (ref 98–107)
CHOLEST SERPL-MCNC: 235 MG/DL (ref 0–200)
CO2 SERPL-SCNC: 24.2 MMOL/L (ref 22–29)
CREAT SERPL-MCNC: 0.66 MG/DL (ref 0.57–1)
EOSINOPHIL # BLD AUTO: 0.13 10*3/MM3 (ref 0–0.4)
EOSINOPHIL NFR BLD AUTO: 2.6 % (ref 0.3–6.2)
ERYTHROCYTE [DISTWIDTH] IN BLOOD BY AUTOMATED COUNT: 12.6 % (ref 12.3–15.4)
GLOBULIN SER CALC-MCNC: 2.3 GM/DL
GLUCOSE SERPL-MCNC: 124 MG/DL (ref 65–99)
HCT VFR BLD AUTO: 38.2 % (ref 34–46.6)
HDLC SERPL-MCNC: 54 MG/DL (ref 40–60)
HGB BLD-MCNC: 13.4 G/DL (ref 12–15.9)
IMM GRANULOCYTES # BLD AUTO: 0.01 10*3/MM3 (ref 0–0.05)
IMM GRANULOCYTES NFR BLD AUTO: 0.2 % (ref 0–0.5)
LDLC SERPL CALC-MCNC: 147 MG/DL (ref 0–100)
LYMPHOCYTES # BLD AUTO: 1.94 10*3/MM3 (ref 0.7–3.1)
LYMPHOCYTES NFR BLD AUTO: 38.3 % (ref 19.6–45.3)
MCH RBC QN AUTO: 30.6 PG (ref 26.6–33)
MCHC RBC AUTO-ENTMCNC: 35.1 G/DL (ref 31.5–35.7)
MCV RBC AUTO: 87.2 FL (ref 79–97)
MONOCYTES # BLD AUTO: 0.48 10*3/MM3 (ref 0.1–0.9)
MONOCYTES NFR BLD AUTO: 9.5 % (ref 5–12)
NEUTROPHILS # BLD AUTO: 2.48 10*3/MM3 (ref 1.7–7)
NEUTROPHILS NFR BLD AUTO: 48.8 % (ref 42.7–76)
NRBC BLD AUTO-RTO: 0 /100 WBC (ref 0–0.2)
PLATELET # BLD AUTO: 246 10*3/MM3 (ref 140–450)
POTASSIUM SERPL-SCNC: 3.8 MMOL/L (ref 3.5–5.2)
PROT SERPL-MCNC: 6.8 G/DL (ref 6–8.5)
RBC # BLD AUTO: 4.38 10*6/MM3 (ref 3.77–5.28)
SODIUM SERPL-SCNC: 142 MMOL/L (ref 136–145)
TRIGL SERPL-MCNC: 170 MG/DL (ref 0–150)
TSH SERPL DL<=0.005 MIU/L-ACNC: 6.32 UIU/ML (ref 0.27–4.2)
VLDLC SERPL CALC-MCNC: 34 MG/DL
WBC # BLD AUTO: 5.07 10*3/MM3 (ref 3.4–10.8)

## 2019-12-31 RX ORDER — ROSUVASTATIN CALCIUM 10 MG/1
10 TABLET, COATED ORAL DAILY
Qty: 30 TABLET | Refills: 2 | Status: SHIPPED | OUTPATIENT
Start: 2019-12-31 | End: 2021-05-27

## 2020-01-02 RX ORDER — LEVOTHYROXINE SODIUM 0.12 MG/1
125 TABLET ORAL DAILY
Qty: 30 TABLET | Refills: 0 | Status: SHIPPED | OUTPATIENT
Start: 2020-01-02 | End: 2020-02-03

## 2020-01-17 ENCOUNTER — OFFICE VISIT (OUTPATIENT)
Dept: FAMILY MEDICINE CLINIC | Facility: CLINIC | Age: 60
End: 2020-01-17

## 2020-01-17 VITALS
BODY MASS INDEX: 31.21 KG/M2 | OXYGEN SATURATION: 98 % | SYSTOLIC BLOOD PRESSURE: 132 MMHG | WEIGHT: 182.8 LBS | HEIGHT: 64 IN | TEMPERATURE: 97.5 F | DIASTOLIC BLOOD PRESSURE: 70 MMHG | HEART RATE: 76 BPM

## 2020-01-17 DIAGNOSIS — H65.02 ACUTE SEROUS OTITIS MEDIA OF LEFT EAR, RECURRENCE NOT SPECIFIED: Primary | ICD-10-CM

## 2020-01-17 PROCEDURE — 99213 OFFICE O/P EST LOW 20 MIN: CPT | Performed by: NURSE PRACTITIONER

## 2020-01-17 RX ORDER — GLIPIZIDE 10 MG/1
TABLET ORAL
Qty: 180 TABLET | Refills: 0 | Status: SHIPPED | OUTPATIENT
Start: 2020-01-17 | End: 2020-04-16

## 2020-01-17 RX ORDER — AMOXICILLIN AND CLAVULANATE POTASSIUM 875; 125 MG/1; MG/1
1 TABLET, FILM COATED ORAL 2 TIMES DAILY
Qty: 14 TABLET | Refills: 0 | Status: SHIPPED | OUTPATIENT
Start: 2020-01-17 | End: 2020-02-05

## 2020-01-17 NOTE — PATIENT INSTRUCTIONS
Otitis Media, Adult    Otitis media means that the middle ear is red and swollen (inflamed) and full of fluid. The condition usually goes away on its own.  Follow these instructions at home:  · Take over-the-counter and prescription medicines only as told by your doctor.  · If you were prescribed an antibiotic medicine, take it as told by your doctor. Do not stop taking the antibiotic even if you start to feel better.  · Keep all follow-up visits as told by your doctor. This is important.  Contact a doctor if:  · You have bleeding from your nose.  · There is a lump on your neck.  · You are not getting better in 5 days.  · You feel worse instead of better.  Get help right away if:  · You have pain that is not helped with medicine.  · You have swelling, redness, or pain around your ear.  · You get a stiff neck.  · You cannot move part of your face (paralyzed).  · You notice that the bone behind your ear hurts when you touch it.  · You get a very bad headache.  Summary  · Otitis media means that the middle ear is red, swollen, and full of fluid.  · This condition usually goes away on its own. In some cases, treatment may be needed.  · If you were prescribed an antibiotic medicine, take it as told by your doctor.  This information is not intended to replace advice given to you by your health care provider. Make sure you discuss any questions you have with your health care provider.  Document Released: 06/05/2009 Document Revised: 01/08/2018 Document Reviewed: 01/08/2018  [employer] Interactive Patient Education © 2019 [employer]

## 2020-01-17 NOTE — PROGRESS NOTES
Subjective   Violet Rahman is a 60 y.o. female.     Chief Complaint   Patient presents with   • Earache     left ear        History of Present Illness     Patient is here today with c/o pain in L side of face for last week and the pain in L ear started today.            The following portions of the patient's history were reviewed and updated as appropriate: allergies, current medications, past family history, past medical history, past social history, past surgical history and problem list.    Past Medical History:   Diagnosis Date   • Anxiety    • Depression    • Diabetes (CMS/HCC)    • Hypertension    • Hypothyroidism    • OA (osteoarthritis)    • OAB (overactive bladder)    • AMELIA (obstructive sleep apnea)    • Stomach ulcer        Past Surgical History:   Procedure Laterality Date   •  SECTION      X2   • COLONOSCOPY N/A 2016    Procedure: COLONOSCOPY;  Surgeon: Raiza Talbot MD;  Location: [facility];  Service:    • ENDOSCOPY N/A 2016    Procedure: ESOPHAGOGASTRODUODENOSCOPY with biopsies  and link test;  Surgeon: Raiza Talbot MD;  Location: [facility] OR;  Service:    • KNEE SURGERY Right    • LAPAROSCOPIC CHOLECYSTECTOMY         Family History   Problem Relation Age of Onset   • Hypertension Mother    • Stroke Mother    • Diabetes Mother    • Lung cancer Father    • Diabetes Sister    • Colon cancer Sister        Social History     Socioeconomic History   • Marital status: Single     Spouse name: Not on file   • Number of children: Not on file   • Years of education: Not on file   • Highest education level: Not on file   Tobacco Use   • Smoking status: Never Smoker   • Smokeless tobacco: Never Used   Substance and Sexual Activity   • Alcohol use: No   • Drug use: No   • Sexual activity: Defer         Current Outpatient Medications:   •  ATENOLOL PO, Take 25 mg by mouth Daily., Disp: , Rfl:   •  BREO ELLIPTA 100-25 MCG/INH inhaler, Inhale 1 puff Daily. Rinse mouth after each use, Disp: ,  Rfl: 11  •  fluticasone (FLONASE) 50 MCG/ACT nasal spray, 2 sprays Daily., Disp: , Rfl: 11  •  FLUZONE QUADRIVALENT 0.5 ML suspension prefilled syringe injection, PHARMACIST ADMINISTERED IMMUNIZATION ADMINISTERED AT TIME OF DISPENSING, Disp: , Rfl: 0  •  glipiZIDE (GLUCOTROL) 10 MG tablet, Take 10 mg by mouth 2 (Two) Times a Day Before Meals., Disp: , Rfl:   •  glucose blood test strip, Use as instructed, Disp: 100 each, Rfl: 2  •  HYDROcodone-acetaminophen (NORCO)  MG per tablet, Take 1 tablet by mouth Every 6 (Six) Hours As Needed for moderate pain (4-6)., Disp: , Rfl:   •  levothyroxine (SYNTHROID, LEVOTHROID) 125 MCG tablet, Take 1 tablet by mouth Daily., Disp: 30 tablet, Rfl: 0  •  metFORMIN (GLUCOPHAGE) 1000 MG tablet, , Disp: , Rfl:   •  ondansetron ODT (ZOFRAN-ODT) 4 MG disintegrating tablet, Take 1 tablet by mouth Every 8 (Eight) Hours As Needed for Nausea or Vomiting., Disp: 12 tablet, Rfl: 2  •  PROAIR  (90 Base) MCG/ACT inhaler, Inhale See Admin Instructions. Inhale 2 puffs by mouth every 4 to 6 hours as needed, Disp: , Rfl: 3  •  rosuvastatin (CRESTOR) 10 MG tablet, Take 1 tablet by mouth Daily., Disp: 30 tablet, Rfl: 2  •  tolterodine LA (DETROL LA) 4 MG 24 hr capsule, Take 4 mg by mouth Daily., Disp: , Rfl: 2  •  zolpidem (AMBIEN) 10 MG tablet, Take 1 tablet by mouth At Night As Needed for Sleep., Disp: 30 tablet, Rfl: 2  •  amoxicillin-clavulanate (AUGMENTIN) 875-125 MG per tablet, Take 1 tablet by mouth 2 (Two) Times a Day., Disp: 14 tablet, Rfl: 0    Review of Systems   HENT: Positive for ear pain, facial swelling (L side) and rhinorrhea. Negative for congestion and sore throat.    Respiratory: Negative for cough, shortness of breath and wheezing.    Cardiovascular: Negative for chest pain.   Gastrointestinal: Negative for abdominal pain, constipation, diarrhea, nausea and vomiting.   Genitourinary: Negative for dysuria and urgency.       Objective   Vitals:    01/17/20 1537   BP:  "132/70   Pulse: 76   Temp: 97.5 °F (36.4 °C)   SpO2: 98%   Weight: 82.9 kg (182 lb 12.8 oz)   Height: 162.6 cm (64\")     Body mass index is 31.38 kg/m².  Physical Exam   Constitutional: She is oriented to person, place, and time. She appears well-developed and well-nourished.   HENT:   Head: Normocephalic and atraumatic.   Right Ear: Tympanic membrane mobility is abnormal.   Left Ear: External ear and ear canal normal. Tympanic membrane is erythematous and bulging. Tympanic membrane mobility is abnormal.   Nose: Rhinorrhea present. Right sinus exhibits no maxillary sinus tenderness and no frontal sinus tenderness. Left sinus exhibits no maxillary sinus tenderness and no frontal sinus tenderness.   Mouth/Throat: Uvula is midline, oropharynx is clear and moist and mucous membranes are normal. Tonsils are 0 on the right. Tonsils are 0 on the left.   Cardiovascular: Normal rate, regular rhythm and normal heart sounds.   Pulmonary/Chest: Effort normal and breath sounds normal.   Neurological: She is alert and oriented to person, place, and time.         Assessment/Plan   Violet was seen today for earache.    Diagnoses and all orders for this visit:    Acute serous otitis media of left ear, recurrence not specified    Other orders  -     amoxicillin-clavulanate (AUGMENTIN) 875-125 MG per tablet; Take 1 tablet by mouth 2 (Two) Times a Day.                 Patient Instructions   Otitis Media, Adult    Otitis media means that the middle ear is red and swollen (inflamed) and full of fluid. The condition usually goes away on its own.  Follow these instructions at home:  · Take over-the-counter and prescription medicines only as told by your doctor.  · If you were prescribed an antibiotic medicine, take it as told by your doctor. Do not stop taking the antibiotic even if you start to feel better.  · Keep all follow-up visits as told by your doctor. This is important.  Contact a doctor if:  · You have bleeding from your " nose.  · There is a lump on your neck.  · You are not getting better in 5 days.  · You feel worse instead of better.  Get help right away if:  · You have pain that is not helped with medicine.  · You have swelling, redness, or pain around your ear.  · You get a stiff neck.  · You cannot move part of your face (paralyzed).  · You notice that the bone behind your ear hurts when you touch it.  · You get a very bad headache.  Summary  · Otitis media means that the middle ear is red, swollen, and full of fluid.  · This condition usually goes away on its own. In some cases, treatment may be needed.  · If you were prescribed an antibiotic medicine, take it as told by your doctor.  This information is not intended to replace advice given to you by your health care provider. Make sure you discuss any questions you have with your health care provider.  Document Released: 06/05/2009 Document Revised: 01/08/2018 Document Reviewed: 01/08/2018  [employer] Patient Education © 2019 [employer]

## 2020-01-21 NOTE — TELEPHONE ENCOUNTER
[place] [facility] [place] [facility] [place].    PLEASE CALL [phone] WITH ANY QUESTIONS    THANKS!

## 2020-02-03 RX ORDER — LEVOTHYROXINE SODIUM 0.12 MG/1
TABLET ORAL
Qty: 30 TABLET | Refills: 2 | Status: SHIPPED | OUTPATIENT
Start: 2020-02-03 | End: 2020-05-04

## 2020-02-05 ENCOUNTER — OFFICE VISIT (OUTPATIENT)
Dept: FAMILY MEDICINE CLINIC | Facility: CLINIC | Age: 60
End: 2020-02-05

## 2020-02-05 VITALS
DIASTOLIC BLOOD PRESSURE: 72 MMHG | HEART RATE: 75 BPM | BODY MASS INDEX: 31.07 KG/M2 | WEIGHT: 182 LBS | SYSTOLIC BLOOD PRESSURE: 126 MMHG | OXYGEN SATURATION: 98 % | TEMPERATURE: 98.5 F | HEIGHT: 64 IN

## 2020-02-05 DIAGNOSIS — R30.0 DYSURIA: Primary | ICD-10-CM

## 2020-02-05 DIAGNOSIS — F32.1 CURRENT MODERATE EPISODE OF MAJOR DEPRESSIVE DISORDER, UNSPECIFIED WHETHER RECURRENT (HCC): ICD-10-CM

## 2020-02-05 DIAGNOSIS — R81 GLUCOSE FOUND IN URINE ON EXAMINATION: ICD-10-CM

## 2020-02-05 DIAGNOSIS — K59.03 DRUG-INDUCED CONSTIPATION: ICD-10-CM

## 2020-02-05 DIAGNOSIS — F41.9 ANXIETY: ICD-10-CM

## 2020-02-05 LAB
BILIRUB BLD-MCNC: NEGATIVE MG/DL
GLUCOSE UR STRIP-MCNC: ABNORMAL MG/DL
KETONES UR QL: NEGATIVE
LEUKOCYTE EST, POC: NEGATIVE
NITRITE UR-MCNC: NEGATIVE MG/ML
PH UR: 6 [PH] (ref 5–8)
PROT UR STRIP-MCNC: NEGATIVE MG/DL
RBC # UR STRIP: NEGATIVE /UL
SP GR UR: 1.02 (ref 1–1.03)
UROBILINOGEN UR QL: NORMAL

## 2020-02-05 PROCEDURE — 99214 OFFICE O/P EST MOD 30 MIN: CPT | Performed by: NURSE PRACTITIONER

## 2020-02-05 PROCEDURE — 81003 URINALYSIS AUTO W/O SCOPE: CPT | Performed by: NURSE PRACTITIONER

## 2020-02-05 RX ORDER — AMOXICILLIN AND CLAVULANATE POTASSIUM 875; 125 MG/1; MG/1
1 TABLET, FILM COATED ORAL 2 TIMES DAILY
Qty: 14 TABLET | Refills: 0 | Status: SHIPPED | OUTPATIENT
Start: 2020-02-05 | End: 2020-03-04

## 2020-02-05 RX ORDER — CETIRIZINE HYDROCHLORIDE 10 MG/1
10 TABLET ORAL DAILY
COMMUNITY
Start: 2020-01-23 | End: 2020-08-31

## 2020-02-05 RX ORDER — CITALOPRAM 20 MG/1
TABLET ORAL
Qty: 30 TABLET | Refills: 2 | Status: SHIPPED | OUTPATIENT
Start: 2020-02-05 | End: 2020-03-13

## 2020-02-05 NOTE — PROGRESS NOTES
Subjective   Violet Rahman is a 60 y.o. female.     Chief Complaint   Patient presents with   • Hypertension   • Difficulty Urinating        History of Present Illness     Patient is here today with c/o difficulty urinating for last week.  This is a new complaint to me.  Just goes a little bit at a time and reports frequency and odor.    Denies any vaginal discharge.  Glucose 3+ at home.   Has been high lately.       New problem to me  Hasn't checked BP at home, but has a lot of pressure in head , chest and eyes.  Was afraid it may be hypertension.  Nasal congestion, discharge, ear pain.      She also would like to be checked for anxiety and depression.  This is a new problem and complaint to me.    PHQ 9 score of 18.  ANJU 7 score of 14.  Admits to feeling anxious a lot of the time and it makes her mood worse.  Denies any SI or HI      The following portions of the patient's history were reviewed and updated as appropriate: allergies, current medications, past family history, past medical history, past social history, past surgical history and problem list.    Past Medical History:   Diagnosis Date   • Anxiety    • Depression    • Diabetes (CMS/HCC)    • Hypertension    • Hypothyroidism    • OA (osteoarthritis)    • OAB (overactive bladder)    • AMELIA (obstructive sleep apnea)    • Stomach ulcer        Past Surgical History:   Procedure Laterality Date   •  SECTION      X2   • COLONOSCOPY N/A 2016    Procedure: COLONOSCOPY;  Surgeon: Raiza Talbot MD;  Location: [facility];  Service:    • ENDOSCOPY N/A 2016    Procedure: ESOPHAGOGASTRODUODENOSCOPY with biopsies  and link test;  Surgeon: Raiza Talbot MD;  Location: [facility];  Service:    • KNEE SURGERY Right    • LAPAROSCOPIC CHOLECYSTECTOMY         Family History   Problem Relation Age of Onset   • Hypertension Mother    • Stroke Mother    • Diabetes Mother    • Lung cancer Father    • Diabetes Sister    • Colon cancer Sister        Social  History     Socioeconomic History   • Marital status: Single     Spouse name: Not on file   • Number of children: Not on file   • Years of education: Not on file   • Highest education level: Not on file   Tobacco Use   • Smoking status: Never Smoker   • Smokeless tobacco: Never Used   Substance and Sexual Activity   • Alcohol use: No   • Drug use: No   • Sexual activity: Defer         Current Outpatient Medications:   •  ATENOLOL PO, Take 25 mg by mouth Daily., Disp: , Rfl:   •  BREO ELLIPTA 100-25 MCG/INH inhaler, Inhale 1 puff Daily. Rinse mouth after each use, Disp: , Rfl: 11  •  cetirizine (zyrTEC) 10 MG tablet, Take 10 mg by mouth Daily. as directed, Disp: , Rfl:   •  fluticasone (FLONASE) 50 MCG/ACT nasal spray, 2 sprays Daily., Disp: , Rfl: 11  •  FLUZONE QUADRIVALENT 0.5 ML suspension prefilled syringe injection, PHARMACIST ADMINISTERED IMMUNIZATION ADMINISTERED AT TIME OF DISPENSING, Disp: , Rfl: 0  •  glipizide (GLUCOTROL) 10 MG tablet, Take 1 tablet by mouth twice daily, Disp: 180 tablet, Rfl: 0  •  glucose blood test strip, Use as instructed, Disp: 100 each, Rfl: 2  •  HYDROcodone-acetaminophen (NORCO)  MG per tablet, Take 1 tablet by mouth Every 6 (Six) Hours As Needed for moderate pain (4-6)., Disp: , Rfl:   •  levothyroxine (SYNTHROID, LEVOTHROID) 125 MCG tablet, TAKE 1 TABLET BY MOUTH ONCE DAILY, Disp: 30 tablet, Rfl: 2  •  metFORMIN (GLUCOPHAGE) 1000 MG tablet, Take 1 tablet by mouth 2 (Two) Times a Day With Meals., Disp: 60 tablet, Rfl: 1  •  ondansetron ODT (ZOFRAN-ODT) 4 MG disintegrating tablet, Take 1 tablet by mouth Every 8 (Eight) Hours As Needed for Nausea or Vomiting., Disp: 12 tablet, Rfl: 2  •  PROAIR  (90 Base) MCG/ACT inhaler, Inhale See Admin Instructions. Inhale 2 puffs by mouth every 4 to 6 hours as needed, Disp: , Rfl: 3  •  rosuvastatin (CRESTOR) 10 MG tablet, Take 1 tablet by mouth Daily., Disp: 30 tablet, Rfl: 2  •  tolterodine LA (DETROL LA) 4 MG 24 hr capsule, Take  "4 mg by mouth Daily., Disp: , Rfl: 2  •  zolpidem (AMBIEN) 10 MG tablet, Take 1 tablet by mouth At Night As Needed for Sleep., Disp: 30 tablet, Rfl: 2  •  amoxicillin-clavulanate (AUGMENTIN) 875-125 MG per tablet, Take 1 tablet by mouth 2 (Two) Times a Day., Disp: 14 tablet, Rfl: 0  •  citalopram (CELEXA) 20 MG tablet, Take 0.5 tablets by mouth Daily for 7 days, THEN 1 tablet Daily for 30 days., Disp: 30 tablet, Rfl: 2  •  linaclotide (LINZESS) 145 MCG capsule capsule, Take 1 capsule by mouth Every Morning Before Breakfast., Disp: 30 capsule, Rfl: 5    Review of Systems   Constitutional: Positive for fatigue. Negative for fever.   Respiratory: Negative for cough, shortness of breath and wheezing.    Cardiovascular: Negative for chest pain.   Gastrointestinal: Positive for constipation. Negative for abdominal pain, diarrhea, nausea and vomiting.   Genitourinary: Positive for frequency and urgency. Negative for dysuria.   Skin: Negative.    Psychiatric/Behavioral: Positive for depressed mood. Negative for suicidal ideas. The patient is nervous/anxious.        Objective   Vitals:    02/05/20 1334   BP: 126/72   Pulse: 75   Temp: 98.5 °F (36.9 °C)   SpO2: 98%   Weight: 82.6 kg (182 lb)   Height: 162.6 cm (64\")     Body mass index is 31.24 kg/m².  Physical Exam   Constitutional: She is oriented to person, place, and time. She appears well-developed and well-nourished.   HENT:   Head: Normocephalic and atraumatic.   Right Ear: Tympanic membrane mobility is abnormal.   Left Ear: Tympanic membrane mobility is abnormal.   Nose: Rhinorrhea present. Right sinus exhibits maxillary sinus tenderness and frontal sinus tenderness. Left sinus exhibits maxillary sinus tenderness and frontal sinus tenderness.   Mouth/Throat: Uvula is midline, oropharynx is clear and moist and mucous membranes are normal. Tonsils are 0 on the right. Tonsils are 0 on the left.   Cardiovascular: Normal rate, regular rhythm and normal heart sounds. "   Pulmonary/Chest: Effort normal and breath sounds normal.   Abdominal: Soft. Normal appearance and bowel sounds are normal. There is no tenderness.   Neurological: She is alert and oriented to person, place, and time.   Psychiatric: Her behavior is normal. Judgment and thought content normal. Her mood appears anxious.         Assessment/Plan   Violet was seen today for hypertension and difficulty urinating.    Diagnoses and all orders for this visit:    Dysuria  -     POC Urinalysis Dipstick, Automated    Glucose found in urine on examination    Anxiety    Current moderate episode of major depressive disorder, unspecified whether recurrent (CMS/HCC)    Drug-induced constipation    Other orders  -     linaclotide (LINZESS) 145 MCG capsule capsule; Take 1 capsule by mouth Every Morning Before Breakfast.  -     citalopram (CELEXA) 20 MG tablet; Take 0.5 tablets by mouth Daily for 7 days, THEN 1 tablet Daily for 30 days.  -     amoxicillin-clavulanate (AUGMENTIN) 875-125 MG per tablet; Take 1 tablet by mouth 2 (Two) Times a Day.      GLUCOSE IN URINE-last a1c checked 1 month ago and 6./7.  Discussed avoidance of sugars and carbs in diet.     We will recheck A1c in one month if symptoms are still going on.      ANXIETY/DEPRESSION: We discussed the diagnosis.  We will start on medication for mood and anxiety. Discussed counseling, but patient does not want to proceed with this option currently.  If worsening mood, stop medication and notify provider.  If any SI or HI go to ER.      SINUSITIS: continue flonase and zyrtec. She hasn't been taking these.   Start antibiotic.  Follow up if no improvement.      CONSTIPATION: has been on linzess for drug induced constipation. Worked well for her.  Restart today.    Will plan short term follow up in one month for reevaluation.             Patient Instructions   Sinusitis, en adultos  Sinusitis, Adult  La sinusitis es el dolor y la hinchazón (inflamación) de los senos paranasales.  Los senos paranasales son espacios vacíos en los huesos alrededor del jomar. Estos se encuentran en los siguientes lugares:  · Alrededor de los ojos.  · En la mitad de la frente.  · Detrás de la nariz.  · En los pómulos.  Los senos paranasales y las fosas nasales están cubiertos de un líquido llamado mucosidad. La mucosidad drena a través de los senos paranasales. La hinchazón puede atrapar mucosidad en los senos paranasales. Rea permite que se desarrollen gérmenes (bacterias, virus u hongos), lo que produce infecciones. La mayoría de las veces, la causa de esta afección es un virus.  ¿Cuáles son las causas?  Las causas de esta afección son:  · Alergias.  · Asma.  · Gérmenes.  · Objetos que obstruyen la nariz o los senos paranasales.  · Crecimientos en el interior de la nariz (pólipos nasales).  · Sustancias químicas o irritantes que están presentes en el aire.  · Hongos (poco frecuente).  ¿Qué incrementa el riesgo?  Es más probable que contraiga esta afección si:  · Tiene debilitado el sistema de defensa del organismo (sistema inmunitario).  · Nada o bucea mucho.  · Usa aerosoles nasales en exceso.  · Fuma.  ¿Cuáles son los signos o los síntomas?  Los principales síntomas de esta afección son dolor y sensación de presión alrededor de los senos paranasales. Otros síntomas pueden incluir los siguientes:  · Nariz tapada (congestión nasal).  · Goteo nasal (drenaje).  · Hinchazón y calor en los senos paranasales.  · Dolor de harini.  · Dolor dental.  · Tos que puede empeorar por la noche.  · Mucosidad que se acumula en la garganta o la parte posterior de la nariz (goteo posnasal).  · Incapacidad de sentir olores y sabores.  · Estar muy cansado (fatiga).  · Fiebre.  · Dolor de garganta.  · Mal aliento.  ¿Cómo se diagnostica?  Esta afección se diagnostica en función de lo siguiente:  · Alicia síntomas.  · Alicia antecedentes médicos.  · Un examen físico.  · Pruebas para averiguar si la afección es de corta duración (aguda) o  de larga duración (crónica). El médico puede:  ? Revisarle la nariz para detectar crecimientos (pólipos).  ? Revisarle los senos paranasales con neri herramienta que tiene neri shin (endoscopio).  ? Revisar si tiene alergias o gérmenes.  ? Hacerle pruebas de diagnóstico por imágenes, lisbet neri resonancia magnética (RM) o exploración por tomografía computarizada (TC).  ¿Cómo se trata?  El tratamiento de esta afección depende de la causa y si es de corta o de larga duración.  · Si la causa es un virus, los síntomas deberían desaparecer solos en el término de 10 días. Pueden darle medicamentos para aliviar los síntomas. Entre ellos, se incluyen los siguientes:  ? Medicamentos para encoger el tejido inflamado de la nariz.  ? Medicamentos para tratar alergias (antihistamínicos).  ? Un aerosol para tratar la hinchazón de las fosas nasales.   ? Enjuagues que ayudan a eliminar la mucosidad espesa de la nariz (lavados con solución salina nasal).  · Si la causa es neri bacteria, es posible que el médico espere para averiguar si usted mejora sin tratamiento. Es posible que le den un antibiótico si usted tiene:  ? Neri infección grave.  ? El sistema de defensa del organismo debilitado.  · Si la causa son crecimientos en la nariz, es posible que necesite neri cirugía.  Siga estas indicaciones en estes casa:  Medicamentos  · [place], use o aplique los medicamentos de venta david y los recetados solamente lisbet se lo haya indicado el médico. Estos pueden incluir aerosoles nasales.  · Si le recetaron un antibiótico, tómelo lisbet se lo haya indicado el médico. No deje de celia los antibióticos aunque comience a sentirse mejor.  Hidrátese y humidifique los ambientes    · Galina suficiente agua para mantener el pis (la orina) de color amarillo pálido.  · Use un humidificador de vapor frío para mantener la humedad de estes hogar por encima del 50 %.  · Inhale vapor farnaz 10 a 15 minutos, de 3 a 4 veces al día, o lisbet se lo haya indicado el médico. Puede  hacer esto en el baño con el vapor del [place] de la ducha.  · Trate de no exponerse al aire frío o seco.  Reposo  · Descanse todo lo posible.  · Duerma con la harini levantada (elevada).  · Asegúrese de dormir lo suficiente cada noche.  Indicaciones generales    · Póngase un paño caliente y húmedo en el jomar 3 a 4 veces al día, o con la frecuencia indicada por el médico. [place] ayuda a calmar las molestias.  · Lávese las julio c frecuentemente con agua y jabón. Use un desinfectante para julio c si no dispone de agua y jabón.  · No fume. Evite estar cerca de personas que fuman (fumador pasivo).  · Concurra a todas las visitas de seguimiento lisbet se lo haya indicado el médico. [place] es importante.  Comuníquese con un médico si:  · Tiene fiebre.  · Alicia síntomas empeoran.  · Los síntomas no mejoran en el período de 10 días.  Solicite ayuda inmediatamente si:  · Tiene un dolor de harini muy intenso.  · No puede dejar de vomitar.  · Tiene dolor muy intenso o hinchazón en la torin del jomar o los ojos.  · Tiene dificultad para christiana.  · Se siente confundido.  · Tiene el jeane rígido.  · Tiene dificultad para respirar.  Resumen  · La sinusitis es la hinchazón de los senos paranasales. Los senos paranasales son espacios vacíos en los huesos alrededor del jomar.  · La causa de esta afección es la inflamación o hinchazón de los tejidos que están en el interior de la nariz. [place] hace que los gérmenes queden atrapados. Los gérmenes pueden provocar infecciones.  · Si le recetaron un antibiótico, tómelo lisbet se lo haya indicado el médico. No deje de tomarlo aunque comience a sentirse mejor.  · Concurra a todas las visitas de seguimiento lisbet se lo haya indicado el médico. [place] es importante.  Esta información no tiene lisbet fin reemplazar el consejo del médico. Asegúrese de hacerle al médico cualquier pregunta que tenga.  Document Released: 09/11/2013 Document Revised: 07/03/2019 Document Reviewed: 07/03/2019  [employer]  Interactive Patient Education © 2019 [employer]  Vivir con ansiedad  Living With Anxiety    Después de maikel sido diagnosticado con trastorno de ansiedad, podría sentirse aliviado por comprender por qué se había sentido o había actuado de cierto modo. Además, es natural sentirse abrumado por el tratamiento que tiene por neha y por lo que shakira significará para estes franki. Con atención y ayuda, puede manejar shakira trastorno y recuperarse.  Cómo hacer frente a la ansiedad  Enfrentar el estrés  El estrés es la reacción del cuerpo ante los cambios y los acontecimientos de la franki, tanto buenos lisbet malos. El estrés puede durar solo algunas horas o puede ser permanente. El estrés puede influir mucho en la ansiedad, por lo que es importante aprender sobre cómo hacerle frente y cómo pensarlo de un modo nuevo.  Hable con el médico o un orientador psicológico para obtener más información sobre cómo reducir el estrés. Podrían sugerirle algunas técnicas para hacerlo, lisbet:  · Musicoterapia. [place] podría incluir crear o escuchar música que disfrute y lo inspire.  · Meditación consciente. [place] implica prestar atención a la respiración normal más que intentar controlarla. Puede realizarse mientras está sentado o camina.  · Oración centrante. Shakira es un tipo de meditación que implica centrarse en neri palabra, frase o imagen sagrada que le sea representativa y le genere gandhi.  · Respiración profunda. Para hacer esto, expanda el estómago e inhale lentamente por la nariz. Mantenga el aire farnaz un lapso de entre 3 y 5 segundos. Luego, exhale lentamente mientras nevin que los músculos del estómago se relajen.  · Diálogo interno. Se trata de neri habilidad por la que usted es capaz de identificar patrones de pensamiento que lo llevan a tener reacciones ansiosas y de corregir dichos pensamientos.  · Relajación muscular. [place] implica tensar los músculos y, luego, relajarlos.  Elija neri técnica para reducir el estrés que se adapte a estes  estilo de franki y estes personalidad. Las técnicas para reducir el estrés llevan tiempo y práctica. Resérvese de 5 a 15 minutos por día para hacerlas. Algunos terapeutas pueden ofrecerle capacitación para aprenderlas. Es posible que algunos planes de seguro médico cubran la capacitación. Otras cosas que puede hacer para manejar el estrés:  · Llevar un registro del estrés. [place] puede ayudarlo a identificar qué le desencadena el estrés y modos de controlar estes reacción.  · Pensar en cómo reacciona ante ciertas situaciones. Es posible que no sea capaz de controlar todo, isaiah puede controlar estes reacción.  · Hacerse tiempo para las actividades que lo ayudan a relajarse y no sentir culpa por pasar estes tiempo de jarvis modo.  La terapia en combinación con las habilidades para enfrentar y reducir el estrés proporciona la mejor alternativa para un tratamiento satisfactorio.  Medicamentos  Los medicamentos pueden ayudar a aliviar los síntomas. Algunos medicamentos para la ansiedad:  · Medicamentos contra la ansiedad.  · Antidepresivos.  · Betabloqueantes.  Es posible que se requieran medicamentos, junto con la terapia, si otros tratamientos no dieron resultados. Un médico debe recetar los medicamentos.  Las relaciones  Las relaciones interpersonales pueden ser muy importantes para ayudar a estes recuperación. Intente pasar más tiempo interactuando con amigos y familiares de confianza. Considere la posibilidad de ir a terapia de maría, celia clases de educación familiar o ir a terapia familiar. La terapia puede ayudarlos a usted y a los demás a comprender mejor el trastorno.  Cómo reconocer cambios en el trastorno  Todos tienen neri respuesta diferente al tratamiento de la ansiedad. Se dice que está recuperado de la ansiedad cuando los síntomas disminuyen y luisito de interferir en las actividades diarias en el hogar o el trabajo. [place] podría significar que usted comenzará a hacer lo siguiente:  · Tener mejor concentración y  atención.  · Dormir mejor.  · Estar menos irritable.  · Tener más energía.  · Tener mejor memoria.  Es importante reconocer cuándo el trastorno empeora. Comuníquese con el médico si kevin síntomas interfieren en estes hogar o estes trabajo, y usted no siente que el trastorno esté mejorando.  Dónde encontrar ayuda y apoyo:  Puede conseguir ayuda y apoyo en los siguientes lugares:  · Grupos de autoayuda.  · Organizaciones comunitarias y en línea.  · Un líder espiritual de confianza.  · Terapia de maría.  · Clases de educación familiar.  · Terapia familiar.  Siga estas instrucciones en estse casa:  · Consuma neri dieta saludable que incluya abundantes frutas, verduras, cereales integrales, productos lácteos descremados y proteínas magras. No consuma muchos alimentos con alto contenido de grasas sólidas, azúcares agregados o sal.  · Actividad física. La mayoría de los adultos debe hacer lo siguiente:  ? Realizar, al menos, 150 minutos de actividad física por semana. El ejercicio debe aumentar la frecuencia cardíaca y hacerlo transpirar (ejercicio de intensidad moderada).  ? Realizar ejercicios de fortalecimiento por lo menos dos veces por semana.  · Disminuir el consumo de cafeína, tabaco, alcohol y otras sustancias potencialmente dañinas.  · Dormir el tiempo adecuado y de la forma correcta. La mayoría de los adultos necesitan entre 7 y 9 horas de sueño todas las noches.  · Opte por cosas que le simplifiquen la franki.  · [place] los medicamentos de venta david y los recetados solamente lisbet se lo haya indicado el médico.  · Evite el consumo de cafeína, alcohol y ciertos medicamentos contra el resfrío de venta sin receta. Estos podrían hacerlo sentir peor. Pregúntele al farmacéutico qué medicamentos no debería celia.  · Concurra a todas las visitas de control lisbet se lo haya indicado el médico. [place] es importante.  Preguntas para hacerle al médico  · ¿Me será útil la terapia?  · ¿Con qué frecuencia kimberly visitar a un médico para el  seguimiento?  · ¿Kit cuánto tiempo tendré que grady los medicamentos?  · ¿Tienen efectos secundarios a safia plazo los medicamentos que pelon?  · ¿Existe neri alternativa que remplace los medicamentos?  Comuníquese con un médico si:  · Le resulta difícil permanecer concentrado o finalizar las tareas diarias.  · Pasa muchas horas por día sintiéndose preocupado por la franki cotidiana.  · La preocupación le provoca un cansancio extremo.  · Comienza a tener jaime de harini o náuseas, o a sentirse tenso.  · Orina más de lo normal.  · Tiene diarrea.  Solicite ayuda de inmediato si:  · Se le acelera la frecuencia cardíaca y le davion respirar.  · Tiene pensamientos acerca de lastimarse o dañar a otras personas.  Si alguna vez siente que puede lastimarse o lastimar a los demás, o tiene pensamientos de poner fin a estes franki, busque ayuda de inmediato. Puede dirigirse al servicio de urgencias más cercano o comunicarse con:  · El servicio de emergencias de estes localidad (911 en los [place]).  · Neri línea de asistencia al suicida y atención en crisis, lisbet la Línea Nacional de Prevención del Suicidio (National Suicide Prevention Lifeline) al [phone]. Está disponible las 24 horas del día.  Resumen  · [place] medidas para enfrentar el estrés puede calmarlo.  · Los medicamentos no pueden curar los trastornos de ansiedad, isaiah pueden ayudar a aliviar los síntomas.  · Los familiares, los amigos y las parejas pueden tener un lugar importante en estes recuperación del trastorno de ansiedad.  Esta información no tiene lisbet fin reemplazar el consejo del médico. Asegúrese de hacerle al médico cualquier pregunta que tenga.  Document Released: 03/27/2018 Document Revised: 03/27/2018 Document Reviewed: 03/27/2018  Elsevier Interactive Patient Education © 2019 [employer] Inc.  Trastorno depresivo mayor en los adultos  Major Depressive Disorder, Adult  El trastorno depresivo mayor (TDM) es neri afección de adilson mental. El TDM suele  hacerlo sentir cristal, desesperanzado o impotente. También puede provocar síntomas en el organismo. El TDM puede afectar lo siguiente:  · El trabajo.  · La escuela.  · Las relaciones.  · Otras actividades habituales.  El TDM puede ser de leve a muy intenso. Puede manifestarse solo neri vez (TDM de episodio único). También puede presentarse varias veces (TDM recurrente).  Entre kevin síntomas principales, se incluyen los siguientes:  · Sentirse cristal, deprimido o irritable la mayor parte del tiempo.  · Pérdida del interés.  Otros síntomas del TDM también incluyen:  · Dormir demasiado o muy poco.  · [place] demasiado o muy poco.  · Cambios en el peso.  · Sentirse cansado (fatigado) o tener poca energía.  · Falta de valoración propia.  · Sentimiento de culpa.  · Dificultad para celia decisiones.  · Dificultad para pensar claramente.  · Pensamientos suicidas o de lastimar a otras personas.  · Sensación de debilidad.  · Sensación de agitación.  · Evitar estar cerca de otras personas (aislarse).  Siga estas indicaciones en estes casa:  Actividad  · [place]bi lo siguiente jefry lisbet se lo haya indicado el médico:  ? Reanude kevin actividades habituales.  ? Gabbi actividad física regularmente.  ? Pase tiempo al aire david.  Alcohol  · Pregúntele al médico cómo se ve afectado el funcionamiento de los antidepresivos por el consumo de bebidas alcohólicas.  · No sharon alcohol. O ray, limite la cantidad que consume.  ? [place] significa no más de 1 medida por día si es mariusz y no está embarazada, y de 2 medidas por día si es hombre. Neri medida equivale a lo siguiente:  § 12 onzas (355 ml) de cerveza.  § 5 onzas (150 ml) de vino.  § 1,5 onzas (45 ml) de bebidas espirituosas.  Instrucciones generales  · [place] los medicamentos de venta david y los recetados solamente lisbet se lo haya indicado el médico.  · Consuma neri dieta saludable.  · Duerma lo suficiente.  · Busque actividades que disfrute. Dora tiempo a hacerlas.  · Considere participar en un  caty de apoyo. El médico quizás pueda sugerirle alguno adecuado para usted.  · Concurra a todas las visitas de seguimiento lisbet se lo haya indicado el médico. [place] es importante.  Dónde encontrar más información:  · [place] Nacional sobre Enfermedades Mentales (National [place] on Mental Illness):  ? www.guillermo.org  · [facility] de [place] (U.[place] National [place] of Mental Health):  ? www.[employer].nih.gov  · Línea Telefónica Nacional para la Prevención del Suicidio (National Suicide Prevention Lifeline):  ? [phone]. La asistencia es gratuita y está disponible las 24 horas.  Comuníquese con un médico si:  · Los síntomas empeoran.  · Aparecen nuevos síntomas.  Solicite ayuda de inmediato si:  · Se autoagrede.  · Ve, oye, saborea, huele o siente cosas que no están presentes (alucinaciones).  Si alguna vez siente que puede lastimarse o lastimar a los demás, o tiene pensamientos de poner fin a estes franki, busque ayuda de inmediato. Puede dirigirse al servicio de emergencias más cercano o comunicarse con:  · El servicio de emergencias de estes localidad (911 en . [place].).  · [place] línea de asistencia al suicida y atención en crisis, lisbet la Línea Telefónica Nacional para la Prevención del Suicidio:  ? [phone]. Está disponible las 24 horas del día.  Esta información no tiene lisbet fin reemplazar el consejo del médico. Asegúrese de hacerle al médico cualquier pregunta que tenga.  Document Released: 11/28/2016 Document Revised: 07/27/2018 Document Reviewed: 01/13/2014  OR Productivity Interactive Patient Education © 2019 Elsevier Inc.

## 2020-02-05 NOTE — PATIENT INSTRUCTIONS
Sinusitis, en adultos  Sinusitis, Adult  La sinusitis es el dolor y la hinchazón (inflamación) de los senos paranasales. Los senos paranasales son espacios vacíos en los huesos alrededor del jomar. Estos se encuentran en los siguientes lugares:  · Alrededor de los ojos.  · En la mitad de la frente.  · Detrás de la nariz.  · En los pómulos.  Los senos paranasales y las fosas nasales están cubiertos de un líquido llamado mucosidad. La mucosidad drena a través de los senos paranasales. La hinchazón puede atrapar mucosidad en los senos paranasales. [place] permite que se desarrollen gérmenes (bacterias, virus u hongos), lo que produce infecciones. La mayoría de las veces, la causa de esta afección es un virus.  ¿Cuáles son las causas?  Las causas de esta afección son:  · Alergias.  · Asma.  · Gérmenes.  · Objetos que obstruyen la nariz o los senos paranasales.  · Crecimientos en el interior de la nariz (pólipos nasales).  · Sustancias químicas o irritantes que están presentes en el aire.  · Hongos (poco frecuente).  ¿Qué incrementa el riesgo?  Es más probable que contraiga esta afección si:  · Tiene debilitado el sistema de defensa del organismo (sistema inmunitario).  · Nada o bucea mucho.  · Usa aerosoles nasales en exceso.  · Fuma.  ¿Cuáles son los signos o los síntomas?  Los principales síntomas de esta afección son dolor y sensación de presión alrededor de los senos paranasales. Otros síntomas pueden incluir los siguientes:  · Nariz tapada (congestión nasal).  · Goteo nasal (drenaje).  · Hinchazón y calor en los senos paranasales.  · Dolor de harini.  · Dolor dental.  · Tos que puede empeorar por la noche.  · Mucosidad que se acumula en la garganta o la parte posterior de la nariz (goteo posnasal).  · Incapacidad de sentir olores y sabores.  · Estar muy cansado (fatiga).  · Fiebre.  · Dolor de garganta.  · Mal aliento.  ¿Cómo se diagnostica?  Esta afección se diagnostica en función de lo siguiente:  · Alicia  síntomas.  · Alicia antecedentes médicos.  · Un examen físico.  · Pruebas para averiguar si la afección es de corta duración (aguda) o de larga duración (crónica). El médico puede:  ? Revisarle la nariz para detectar crecimientos (pólipos).  ? Revisarle los senos paranasales con neri herramienta que tiene neri shin (endoscopio).  ? Revisar si tiene alergias o gérmenes.  ? Hacerle pruebas de diagnóstico por imágenes, lisbet neri resonancia magnética (RM) o exploración por tomografía computarizada (TC).  ¿Cómo se trata?  El tratamiento de esta afección depende de la causa y si es de corta o de larga duración.  · Si la causa es un virus, los síntomas deberían desaparecer solos en el término de 10 días. Pueden darle medicamentos para aliviar los síntomas. Entre ellos, se incluyen los siguientes:  ? Medicamentos para encoger el tejido inflamado de la nariz.  ? Medicamentos para tratar alergias (antihistamínicos).  ? Un aerosol para tratar la hinchazón de las fosas nasales.   ? Enjuagues que ayudan a eliminar la mucosidad espesa de la nariz (lavados con solución salina nasal).  · Si la causa es neri bacteria, es posible que el médico espere para averiguar si usted mejora sin tratamiento. Es posible que le den un antibiótico si usted tiene:  ? Neri infección grave.  ? El sistema de defensa del organismo debilitado.  · Si la causa son crecimientos en la nariz, es posible que necesite neri cirugía.  Siga estas indicaciones en estes casa:  Medicamentos  · [place], use o aplique los medicamentos de venta david y los recetados solamente lisbet se lo haya indicado el médico. Estos pueden incluir aerosoles nasales.  · Si le recetaron un antibiótico, tómelo lisbet se lo haya indicado el médico. No deje de celia los antibióticos aunque comience a sentirse mejor.  Hidrátese y humidifique los ambientes    · [place]ailna suficiente agua para mantener el pis (la orina) de color amarillo pálido.  · Use un humidificador de vapor frío para mantener la humedad de estes  hogar por encima del 50 %.  · Inhale vapor farnaz 10 a 15 minutos, de 3 a 4 veces al día, o lisbet se lo haya indicado el médico. Puede hacer esto en el baño con el vapor del [place] de la ducha.  · Trate de no exponerse al aire frío o seco.  Reposo  · Descanse todo lo posible.  · Duerma con la harini levantada (elevada).  · Asegúrese de dormir lo suficiente cada noche.  Indicaciones generales    · Póngase un paño caliente y húmedo en el jomar 3 a 4 veces al día, o con la frecuencia indicada por el médico. [place] ayuda a calmar las molestias.  · Lávese las julio c frecuentemente con agua y jabón. Use un desinfectante para julio c si no dispone de agua y jabón.  · No fume. Evite estar cerca de personas que fuman (fumador pasivo).  · Concurra a todas las visitas de seguimiento lisbet se lo haya indicado el médico. [place] es importante.  Comuníquese con un médico si:  · Tiene fiebre.  · Alicia síntomas empeoran.  · Los síntomas no mejoran en el período de 10 días.  Solicite ayuda inmediatamente si:  · Tiene un dolor de harini muy intenso.  · No puede dejar de vomitar.  · Tiene dolor muy intenso o hinchazón en la torin del jomar o los ojos.  · Tiene dificultad para christiana.  · Se siente confundido.  · Tiene el jeane rígido.  · Tiene dificultad para respirar.  Resumen  · La sinusitis es la hinchazón de los senos paranasales. Los senos paranasales son espacios vacíos en los huesos alrededor del jomar.  · La causa de esta afección es la inflamación o hinchazón de los tejidos que están en el interior de la nariz. [place] hace que los gérmenes queden atrapados. Los gérmenes pueden provocar infecciones.  · Si le recetaron un antibiótico, tómelo lisbet se lo haya indicado el médico. No deje de tomarlo aunque comience a sentirse mejor.  · Concurra a todas las visitas de seguimiento lisbet se lo haya indicado el médico. [place] es importante.  Esta información no tiene lisbet fin reemplazar el consejo del médico. Asegúrese de hacerle al médico  cualquier pregunta que tenga.  Document Released: 09/11/2013 Document Revised: 07/03/2019 Document Reviewed: 07/03/2019  [employer] Interactive Patient Education © 2019 [employer] Inc.  Vivir con ansiedad  Living With Anxiety    Después de maikel sido diagnosticado con trastorno de ansiedad, podría sentirse aliviado por comprender por qué se había sentido o había actuado de cierto modo. Además, es natural sentirse abrumado por el tratamiento que tiene por neha y por lo que shakira significará para estes franki. Con atención y ayuda, puede manejar shakira trastorno y recuperarse.  Cómo hacer frente a la ansiedad  Enfrentar el estrés  El estrés es la reacción del cuerpo ante los cambios y los acontecimientos de la franki, tanto buenos lisbet malos. El estrés puede durar solo algunas horas o puede ser permanente. El estrés puede influir mucho en la ansiedad, por lo que es importante aprender sobre cómo hacerle frente y cómo pensarlo de un modo nuevo.  Hable con el médico o un orientador psicológico para obtener más información sobre cómo reducir el estrés. Podrían sugerirle algunas técnicas para hacerlo, lisbet:  · Musicoterapia. [place] podría incluir crear o escuchar música que disfrute y lo inspire.  · Meditación consciente. [place] implica prestar atención a la respiración normal más que intentar controlarla. Puede realizarse mientras está sentado o camina.  · Oración centrante. [place] es un tipo de meditación que implica centrarse en neri palabra, frase o imagen sagrada que le sea representativa y le genere gandhi.  · Respiración profunda. Para hacer esto, expanda el estómago e inhale lentamente por la nariz. Mantenga el aire farnaz un lapso de entre 3 y 5 segundos. Luego, exhale lentamente mientras nevin que los músculos del estómago se relajen.  · Diálogo interno. Se trata de neri habilidad por la que usted es capaz de identificar patrones de pensamiento que lo llevan a tener reacciones ansiosas y de corregir dichos pensamientos.  · Relajación  muscular. [place] implica tensar los músculos y, luego, relajarlos.  Elija neri técnica para reducir el estrés que se adapte a estes estilo de franki y estes personalidad. Las técnicas para reducir el estrés llevan tiempo y práctica. Resérvese de 5 a 15 minutos por día para hacerlas. Algunos terapeutas pueden ofrecerle capacitación para aprenderlas. Es posible que algunos planes de seguro médico cubran la capacitación. Otras cosas que puede hacer para manejar el estrés:  · Llevar un registro del estrés. [place] puede ayudarlo a identificar qué le desencadena el estrés y modos de controlar estes reacción.  · Pensar en cómo reacciona ante ciertas situaciones. Es posible que no sea capaz de controlar todo, isaiah puede controlar estes reacción.  · Hacerse tiempo para las actividades que lo ayudan a relajarse y no sentir culpa por pasar estes tiempo de jarvis modo.  La terapia en combinación con las habilidades para enfrentar y reducir el estrés proporciona la mejor alternativa para un tratamiento satisfactorio.  Medicamentos  Los medicamentos pueden ayudar a aliviar los síntomas. Algunos medicamentos para la ansiedad:  · Medicamentos contra la ansiedad.  · Antidepresivos.  · Betabloqueantes.  Es posible que se requieran medicamentos, junto con la terapia, si otros tratamientos no dieron resultados. Un médico debe recetar los medicamentos.  Las relaciones  Las relaciones interpersonales pueden ser muy importantes para ayudar a estes recuperación. Intente pasar más tiempo interactuando con amigos y familiares de confianza. Considere la posibilidad de ir a terapia de maría, celia clases de educación familiar o ir a terapia familiar. La terapia puede ayudarlos a usted y a los demás a comprender mejor el trastorno.  Cómo reconocer cambios en el trastorno  Todos tienen neri respuesta diferente al tratamiento de la ansiedad. Se dice que está recuperado de la ansiedad cuando los síntomas disminuyen y luisito de interferir en las actividades diarias en el  hogar o el trabajo. [place] podría significar que usted comenzará a hacer lo siguiente:  · Tener mejor concentración y atención.  · Dormir mejor.  · Estar menos irritable.  · Tener más energía.  · Tener mejor memoria.  Es importante reconocer cuándo el trastorno empeora. Comuníquese con el médico si kevin síntomas interfieren en estes hogar o estes trabajo, y usted no siente que el trastorno esté mejorando.  Dónde encontrar ayuda y apoyo:  Puede conseguir ayuda y apoyo en los siguientes lugares:  · Grupos de autoayuda.  · Organizaciones comunitarias y en línea.  · Un líder espiritual de confianza.  · Terapia de maría.  · Clases de educación familiar.  · Terapia familiar.  Siga estas instrucciones en estes casa:  · Consuma neri dieta saludable que incluya abundantes frutas, verduras, cereales integrales, productos lácteos descremados y proteínas magras. No consuma muchos alimentos con alto contenido de grasas sólidas, azúcares agregados o sal.  · Actividad física. La mayoría de los adultos debe hacer lo siguiente:  ? Realizar, al menos, 150 minutos de actividad física por semana. El ejercicio debe aumentar la frecuencia cardíaca y hacerlo transpirar (ejercicio de intensidad moderada).  ? Realizar ejercicios de fortalecimiento por lo menos dos veces por semana.  · Disminuir el consumo de cafeína, tabaco, alcohol y otras sustancias potencialmente dañinas.  · Dormir el tiempo adecuado y de la forma correcta. La mayoría de los adultos necesitan entre 7 y 9 horas de sueño todas las noches.  · Opte por cosas que le simplifiquen la franki.  · [place] los medicamentos de venta david y los recetados solamente lisbet se lo haya indicado el médico.  · Evite el consumo de cafeína, alcohol y ciertos medicamentos contra el resfrío de venta sin receta. Estos podrían hacerlo sentir peor. Pregúntele al farmacéutico qué medicamentos no debería celia.  · Concurra a todas las visitas de control lisbet se lo haya indicado el médico. [place] es  importante.  Preguntas para hacerle al médico  · ¿Me será útil la terapia?  · ¿Con qué frecuencia kimberly visitar a un médico para el seguimiento?  · ¿Kit cuánto tiempo tendré que grady los medicamentos?  · ¿Tienen efectos secundarios a safia plazo los medicamentos que pelon?  · ¿Existe neri alternativa que remplace los medicamentos?  Comuníquese con un médico si:  · Le resulta difícil permanecer concentrado o finalizar las tareas diarias.  · Pasa muchas horas por día sintiéndose preocupado por la franki cotidiana.  · La preocupación le provoca un cansancio extremo.  · Comienza a tener jaime de harini o náuseas, o a sentirse tenso.  · Orina más de lo normal.  · Tiene diarrea.  Solicite ayuda de inmediato si:  · Se le acelera la frecuencia cardíaca y le davion respirar.  · Tiene pensamientos acerca de lastimarse o dañar a otras personas.  Si alguna vez siente que puede lastimarse o lastimar a los demás, o tiene pensamientos de poner fin a estes franki, busque ayuda de inmediato. Puede dirigirse al servicio de urgencias más cercano o comunicarse con:  · El servicio de emergencias de estes localidad (911 en los [place]).  · Neri línea de asistencia al suicida y atención en crisis, lisbet la Línea Nacional de Prevención del Suicidio (National Suicide Prevention Lifeline) al [phone]. Está disponible las 24 horas del día.  Resumen  · [place] medidas para enfrentar el estrés puede calmarlo.  · Los medicamentos no pueden curar los trastornos de ansiedad, isaiah pueden ayudar a aliviar los síntomas.  · Los familiares, los amigos y las parejas pueden tener un lugar importante en estes recuperación del trastorno de ansiedad.  Esta información no tiene lisbet fin reemplazar el consejo del médico. Asegúrese de hacerle al médico cualquier pregunta que tenga.  Document Released: 03/27/2018 Document Revised: 03/27/2018 Document Reviewed: 03/27/2018  Elsevier Interactive Patient Education © 2019 Elsevier Inc.  Trastorno depresivo mayor en  los adultos  Major Depressive Disorder, Adult  El trastorno depresivo mayor (TDM) es neri afección de adilson mental. El TDM suele hacerlo sentir cristal, desesperanzado o impotente. También puede provocar síntomas en el organismo. El TDM puede afectar lo siguiente:  · El trabajo.  · La escuela.  · Las relaciones.  · Otras actividades habituales.  El TDM puede ser de leve a muy intenso. Puede manifestarse solo neri vez (TDM de episodio único). También puede presentarse varias veces (TDM recurrente).  Entre kevin síntomas principales, se incluyen los siguientes:  · Sentirse cristal, deprimido o irritable la mayor parte del tiempo.  · Pérdida del interés.  Otros síntomas del TDM también incluyen:  · Dormir demasiado o muy poco.  · [place] demasiado o muy poco.  · Cambios en el peso.  · Sentirse cansado (fatigado) o tener poca energía.  · Falta de valoración propia.  · Sentimiento de culpa.  · Dificultad para celia decisiones.  · Dificultad para pensar claramente.  · Pensamientos suicidas o de lastimar a otras personas.  · Sensación de debilidad.  · Sensación de agitación.  · Evitar estar cerca de otras personas (aislarse).  Siga estas indicaciones en estes casa:  Actividad  · [place] lo siguiente jefry lisbet se lo haya indicado el médico:  ? Reanude kevin actividades habituales.  ? Gabbi actividad física regularmente.  ? Pase tiempo al aire david.  Alcohol  · Pregúntele al médico cómo se ve afectado el funcionamiento de los antidepresivos por el consumo de bebidas alcohólicas.  · No sharon alcohol. O ray, limite la cantidad que consume.  ? [place] significa no más de 1 medida por día si es mariusz y no está embarazada, y de 2 medidas por día si es hombre. Neri medida equivale a lo siguiente:  § 12 onzas (355 ml) de cerveza.  § 5 onzas (150 ml) de vino.  § 1,5 onzas (45 ml) de bebidas espirituosas.  Instrucciones generales  · [place] los medicamentos de venta david y los recetados solamente lisbet se lo haya indicado el médico.  · Consuma neri dieta  saludable.  · Duerma lo suficiente.  · Busque actividades que disfrute. Dora tiempo a hacerlas.  · Considere participar en un caty de apoyo. El médico quizás pueda sugerirle alguno adecuado para usted.  · Concurra a todas las visitas de seguimiento lisbet se lo haya indicado el médico. [place] es importante.  Dónde encontrar más información:  · [place] Nacional sobre Enfermedades Mentales (National [place] on Mental Illness):  ? www.guillermo.org  · [facility] . [place] (U.S[place] National [place] of Mental Health):  ? www.nimh.nih.gov  · [place]ínea Telefónica Nacional para la Prevención del Suicidio (National Suicide Prevention Lifeline):  ? 1-719.209.7879. La asistencia es gratuita y está disponible las 24 horas.  Comuníquese con un médico si:  · Los síntomas empeoran.  · Aparecen nuevos síntomas.  Solicite ayuda de inmediato si:  · Se autoagrede.  · Ve, oye, saborea, huele o siente cosas que no están presentes (alucinaciones).  Si alguna vez siente que puede lastimarse o lastimar a los demás, o tiene pensamientos de poner fin a estes franki, busque ayuda de inmediato. Puede dirigirse al servicio de emergencias más cercano o comunicarse con:  · El servicio de emergencias de estes localidad (911 en . [place].).  · [place] línea de asistencia al suicida y atención en crisis, lisbet la Línea Telefónica Nacional para la Prevención del Suicidio:  ? [phone]. Está disponible las 24 horas del día.  Esta información no tiene lisbet fin reemplazar el consejo del médico. Asegúrese de hacerle al médico cualquier pregunta que tenga.  Document Released: 11/28/2016 Document Revised: 07/27/2018 Document Reviewed: 01/13/2014  Elsevier Interactive Patient Education © 2019 Elsevier Inc.

## 2020-02-07 RX ORDER — ATENOLOL 25 MG/1
25 TABLET ORAL DAILY
Qty: 90 TABLET | Refills: 0 | Status: SHIPPED | OUTPATIENT
Start: 2020-02-07 | End: 2020-04-02

## 2020-03-04 ENCOUNTER — OFFICE VISIT (OUTPATIENT)
Dept: FAMILY MEDICINE CLINIC | Facility: CLINIC | Age: 60
End: 2020-03-04

## 2020-03-04 VITALS
BODY MASS INDEX: 30.39 KG/M2 | DIASTOLIC BLOOD PRESSURE: 60 MMHG | HEIGHT: 64 IN | SYSTOLIC BLOOD PRESSURE: 100 MMHG | TEMPERATURE: 97.4 F | HEART RATE: 68 BPM | WEIGHT: 178 LBS | OXYGEN SATURATION: 97 %

## 2020-03-04 DIAGNOSIS — F41.9 ANXIETY: ICD-10-CM

## 2020-03-04 DIAGNOSIS — N39.3 STRESS INCONTINENCE OF URINE: ICD-10-CM

## 2020-03-04 DIAGNOSIS — R30.0 DYSURIA: Primary | ICD-10-CM

## 2020-03-04 DIAGNOSIS — F32.1 CURRENT MODERATE EPISODE OF MAJOR DEPRESSIVE DISORDER, UNSPECIFIED WHETHER RECURRENT (HCC): ICD-10-CM

## 2020-03-04 DIAGNOSIS — B37.31 YEAST VAGINITIS: ICD-10-CM

## 2020-03-04 LAB
BACTERIAL VAGINOSIS VAG-IMP: NEGATIVE
BILIRUB BLD-MCNC: NEGATIVE MG/DL
CLARITY, POC: CLEAR
COLOR UR: YELLOW
EXPIRATION DATE: NORMAL
GLUCOSE UR STRIP-MCNC: ABNORMAL MG/DL
KETONES UR QL: NEGATIVE
LEUKOCYTE EST, POC: NEGATIVE
Lab: NORMAL
NITRITE UR-MCNC: NEGATIVE MG/ML
PH UR: 6 [PH] (ref 5–8)
PROT UR STRIP-MCNC: NEGATIVE MG/DL
RBC # UR STRIP: NEGATIVE /UL
SP GR UR: 1.01 (ref 1–1.03)
UROBILINOGEN UR QL: NORMAL

## 2020-03-04 PROCEDURE — 81002 URINALYSIS NONAUTO W/O SCOPE: CPT | Performed by: NURSE PRACTITIONER

## 2020-03-04 PROCEDURE — 99214 OFFICE O/P EST MOD 30 MIN: CPT | Performed by: NURSE PRACTITIONER

## 2020-03-04 RX ORDER — FLUCONAZOLE 150 MG/1
150 TABLET ORAL ONCE
Qty: 2 TABLET | Refills: 0 | Status: SHIPPED | OUTPATIENT
Start: 2020-03-04 | End: 2020-03-04

## 2020-03-04 RX ORDER — HYDROCODONE BITARTRATE AND ACETAMINOPHEN 7.5; 325 MG/1; MG/1
TABLET ORAL
COMMUNITY
Start: 2020-03-03 | End: 2020-09-22

## 2020-03-04 NOTE — PATIENT INSTRUCTIONS
Vaginal Yeast infection, Adult    Vaginal yeast infection is a condition that causes vaginal discharge as well as soreness, swelling, and redness (inflammation) of the vagina. This is a common condition. Some women get this infection frequently.  What are the causes?  This condition is caused by a change in the normal balance of the yeast (candida) and bacteria that live in the vagina. This change causes an overgrowth of yeast, which causes the inflammation.  What increases the risk?  The condition is more likely to develop in women who:  · Take antibiotic medicines.  · Have diabetes.  · Take birth control pills.  · Are pregnant.  · Douche often.  · Have a weak body defense system (immune system).  · Have been taking steroid medicines for a long time.  · Frequently wear tight clothing.  What are the signs or symptoms?  Symptoms of this condition include:  · White, thick, creamy vaginal discharge.  · Swelling, itching, redness, and irritation of the vagina. The lips of the vagina (vulva) may be affected as well.  · Pain or a burning feeling while urinating.  · Pain during sex.  How is this diagnosed?  This condition is diagnosed based on:  · Your medical history.  · A physical exam.  · A pelvic exam. Your health care provider will examine a sample of your vaginal discharge under a microscope. Your health care provider may send this sample for testing to confirm the diagnosis.  How is this treated?  This condition is treated with medicine. Medicines may be over-the-counter or prescription. You may be told to use one or more of the following:  · Medicine that is taken by mouth (orally).  · Medicine that is applied as a cream (topically).  · Medicine that is inserted directly into the vagina (suppository).  Follow these instructions at home:    Lifestyle  · Do not have sex until your health care provider approves. Tell your sex partner that you have a yeast infection. That person should go to his or her health care  provider and ask if they should also be treated.  · Do not wear tight clothes, such as pantyhose or tight pants.  · Wear breathable cotton underwear.  General instructions  · Take or apply over-the-counter and prescription medicines only as told by your health care provider.  · Eat more yogurt. This may help to keep your yeast infection from returning.  · Do not use tampons until your health care provider approves.  · Try taking a sitz bath to help with discomfort. This is a warm water bath that is taken while you are sitting down. The water should only come up to your hips and should cover your buttocks. Do this 3-4 times per day or as told by your health care provider.  · Do not douche.  · If you have diabetes, keep your blood sugar levels under control.  · Keep all follow-up visits as told by your health care provider. This is important.  Contact a health care provider if:  · You have a fever.  · Your symptoms go away and then return.  · Your symptoms do not get better with treatment.  · Your symptoms get worse.  · You have new symptoms.  · You develop blisters in or around your vagina.  · You have blood coming from your vagina and it is not your menstrual period.  · You develop pain in your abdomen.  Summary  · Vaginal yeast infection is a condition that causes discharge as well as soreness, swelling, and redness (inflammation) of the vagina.  · This condition is treated with medicine. Medicines may be over-the-counter or prescription.  · Take or apply over-the-counter and prescription medicines only as told by your health care provider.  · Do not douche. Do not have sex or use tampons until your health care provider approves.  · Contact a health care provider if your symptoms do not get better with treatment or your symptoms go away and then return.  This information is not intended to replace advice given to you by your health care provider. Make sure you discuss any questions you have with your health care  provider.  Document Released: 09/27/2006 Document Revised: 05/06/2019 Document Reviewed: 05/06/2019  [employer] Interactive Patient Education © 2020 [employer]  Living With Anxiety    After being diagnosed with an anxiety disorder, you may be relieved to know why you have felt or behaved a certain way. It is natural to also feel overwhelmed about the treatment ahead and what it will mean for your life. With care and support, you can manage this condition and recover from it.  How to cope with anxiety  Dealing with stress  Stress is your body’s reaction to life changes and events, both good and bad. Stress can last just a few hours or it can be ongoing. Stress can play a major role in anxiety, so it is important to learn both how to cope with stress and how to think about it differently.  Talk with your health care provider or a counselor to learn more about stress reduction. He or she may suggest some stress reduction techniques, such as:  · Music therapy. This can include creating or listening to music that you enjoy and that inspires you.  · Mindfulness-based meditation. This involves being aware of your normal breaths, rather than trying to control your breathing. It can be done while sitting or walking.  · Centering prayer. This is a kind of meditation that involves focusing on a word, phrase, or sacred image that is meaningful to you and that brings you peace.  · Deep breathing. To do this, expand your stomach and inhale slowly through your nose. Hold your breath for 3-5 seconds. Then exhale slowly, allowing your stomach muscles to relax.  · Self-talk. This is a skill where you identify thought patterns that lead to anxiety reactions and correct those thoughts.  · Muscle relaxation. This involves tensing muscles then relaxing them.  Choose a stress reduction technique that fits your lifestyle and personality. Stress reduction techniques take time and practice. Set aside 5-15 minutes a day to do them.  Therapists can offer training in these techniques. The training may be covered by some insurance plans. Other things you can do to manage stress include:  · Keeping a stress diary. This can help you learn what triggers your stress and ways to control your response.  · Thinking about how you respond to certain situations. You may not be able to control everything, but you can control your reaction.  · Making time for activities that help you relax, and not feeling guilty about spending your time in this way.  Therapy combined with coping and stress-reduction skills provides the best chance for successful treatment.  Medicines  Medicines can help ease symptoms. Medicines for anxiety include:  · Anti-anxiety drugs.  · Antidepressants.  · Beta-blockers.  Medicines may be used as the main treatment for anxiety disorder, along with therapy, or if other treatments are not working. Medicines should be prescribed by a health care provider.  Relationships  Relationships can play a big part in helping you recover. Try to spend more time connecting with trusted friends and family members. Consider going to couples counseling, taking family education classes, or going to family therapy. Therapy can help you and others better understand the condition.  How to recognize changes in your condition  Everyone has a different response to treatment for anxiety. Recovery from anxiety happens when symptoms decrease and stop interfering with your daily activities at home or work. This may mean that you will start to:  · Have better concentration and focus.  · Sleep better.  · Be less irritable.  · Have more energy.  · Have improved memory.  It is important to recognize when your condition is getting worse. Contact your health care provider if your symptoms interfere with home or work and you do not feel like your condition is improving.  Where to find help and support:  You can get help and support from these sources:  · Self-help  groups.  · Online and community organizations.  · A trusted spiritual leader.  · Couples counseling.  · Family education classes.  · Family therapy.  Follow these instructions at home:  · Eat a healthy diet that includes plenty of vegetables, fruits, whole grains, low-fat dairy products, and lean protein. Do not eat a lot of foods that are high in solid fats, added sugars, or salt.  · Exercise. Most adults should do the following:  ? Exercise for at least 150 minutes each week. The exercise should increase your heart rate and make you sweat (moderate-intensity exercise).  ? Strengthening exercises at least twice a week.  · Cut down on caffeine, tobacco, alcohol, and other potentially harmful substances.  · Get the right amount and quality of sleep. Most adults need 7-9 hours of sleep each night.  · Make choices that simplify your life.  · Take over-the-counter and prescription medicines only as told by your health care provider.  · Avoid caffeine, alcohol, and certain over-the-counter cold medicines. These may make you feel worse. Ask your pharmacist which medicines to avoid.  · Keep all follow-up visits as told by your health care provider. This is important.  Questions to ask your health care provider  · Would I benefit from therapy?  · How often should I follow up with a health care provider?  · How long do I need to take medicine?  · Are there any long-term side effects of my medicine?  · Are there any alternatives to taking medicine?  Contact a health care provider if:  · You have a hard time staying focused or finishing daily tasks.  · You spend many hours a day feeling worried about everyday life.  · You become exhausted by worry.  · You start to have headaches, feel tense, or have nausea.  · You urinate more than normal.  · You have diarrhea.  Get help right away if:  · You have a racing heart and shortness of breath.  · You have thoughts of hurting yourself or others.  If you ever feel like you may hurt  yourself or others, or have thoughts about taking your own life, get help right away. You can go to your nearest emergency department or call:  · Your local emergency services (911 in the [place]  · A suicide crisis helpline, such as the National Suicide Prevention Lifeline at 1-696.912.3766. This is open 24-hours a day.  Summary  · Taking steps to deal with stress can help calm you.  · Medicines cannot cure anxiety disorders, but they can help ease symptoms.  · Family, friends, and partners can play a big part in helping you recover from an anxiety disorder.  This information is not intended to replace advice given to you by your health care provider. Make sure you discuss any questions you have with your health care provider.  Document Released: 12/12/2017 Document Revised: 12/12/2017 Document Reviewed: 12/12/2017  [employer] Interactive Patient Education © 2020 [employer]

## 2020-03-04 NOTE — PROGRESS NOTES
Subjective   Violet Rahman is a 60 y.o. female.     Chief Complaint   Patient presents with   • Follow-up     UTI   • Nasal Congestion     smelly mucous, runny nose, wants referral   • Pelvic Pain     not pain, just feel something is moving on left lower quadrant when lying down        History of Present Illness     Patient is here today for follow up on anxiety and depression.  She states that the citalopram is working well for her mood.  She denies any side effects to medication.       She also has urinary odor and is concerned about that. Denies any pain with urination.   Denies any increase in vaginal discharge.   States sometimes when she coughs she pees.  On detrol and helps, but still has issue.  Does have itching at times vaginally      The following portions of the patient's history were reviewed and updated as appropriate: allergies, current medications, past family history, past medical history, past social history, past surgical history and problem list.    Past Medical History:   Diagnosis Date   • Anxiety    • Depression    • Diabetes (CMS/HCC)    • Hypertension    • Hypothyroidism    • OA (osteoarthritis)    • OAB (overactive bladder)    • AMELIA (obstructive sleep apnea)    • Stomach ulcer        Past Surgical History:   Procedure Laterality Date   •  SECTION      X2   • COLONOSCOPY N/A 2016    Procedure: COLONOSCOPY;  Surgeon: Raiza Talbot MD;  Location: [facility] OR;  Service:    • ENDOSCOPY N/A 2016    Procedure: ESOPHAGOGASTRODUODENOSCOPY with biopsies  and link test;  Surgeon: Raiza Talbot MD;  Location: [facility] OR;  Service:    • KNEE SURGERY Right    • LAPAROSCOPIC CHOLECYSTECTOMY         Family History   Problem Relation Age of Onset   • Hypertension Mother    • Stroke Mother    • Diabetes Mother    • Lung cancer Father    • Diabetes Sister    • Colon cancer Sister        Social History     Socioeconomic History   • Marital status: Single     Spouse name: Not on file   •  Number of children: Not on file   • Years of education: Not on file   • Highest education level: Not on file   Tobacco Use   • Smoking status: Never Smoker   • Smokeless tobacco: Never Used   Substance and Sexual Activity   • Alcohol use: No   • Drug use: No   • Sexual activity: Defer         Current Outpatient Medications:   •  atenolol (TENORMIN) 25 MG tablet, Take 1 tablet by mouth Daily., Disp: 90 tablet, Rfl: 0  •  BREO ELLIPTA 100-25 MCG/INH inhaler, Inhale 1 puff Daily. Rinse mouth after each use, Disp: , Rfl: 11  •  cetirizine (zyrTEC) 10 MG tablet, Take 10 mg by mouth Daily. as directed, Disp: , Rfl:   •  citalopram (CELEXA) 20 MG tablet, Take 0.5 tablets by mouth Daily for 7 days, THEN 1 tablet Daily for 30 days., Disp: 30 tablet, Rfl: 2  •  fluticasone (FLONASE) 50 MCG/ACT nasal spray, 2 sprays Daily., Disp: , Rfl: 11  •  glipizide (GLUCOTROL) 10 MG tablet, Take 1 tablet by mouth twice daily, Disp: 180 tablet, Rfl: 0  •  glucose blood test strip, Use as instructed, Disp: 200 each, Rfl: 2  •  HYDROcodone-acetaminophen (NORCO) 7.5-325 MG per tablet, , Disp: , Rfl:   •  levothyroxine (SYNTHROID, LEVOTHROID) 125 MCG tablet, TAKE 1 TABLET BY MOUTH ONCE DAILY, Disp: 30 tablet, Rfl: 2  •  linaclotide (LINZESS) 145 MCG capsule capsule, Take 1 capsule by mouth Every Morning Before Breakfast., Disp: 30 capsule, Rfl: 5  •  metFORMIN (GLUCOPHAGE) 1000 MG tablet, Take 1 tablet by mouth 2 (Two) Times a Day With Meals., Disp: 180 tablet, Rfl: 0  •  ondansetron ODT (ZOFRAN-ODT) 4 MG disintegrating tablet, Take 1 tablet by mouth Every 8 (Eight) Hours As Needed for Nausea or Vomiting., Disp: 12 tablet, Rfl: 2  •  PROAIR  (90 Base) MCG/ACT inhaler, Inhale See Admin Instructions. Inhale 2 puffs by mouth every 4 to 6 hours as needed, Disp: , Rfl: 3  •  rosuvastatin (CRESTOR) 10 MG tablet, Take 1 tablet by mouth Daily., Disp: 30 tablet, Rfl: 2  •  zolpidem (AMBIEN) 10 MG tablet, Take 1 tablet by mouth At Night As Needed  "for Sleep., Disp: 30 tablet, Rfl: 2  •  fluconazole (DIFLUCAN) 150 MG tablet, Take 1 tablet by mouth 1 (One) Time for 1 dose. May repeat 1 time in 72 hours if necessary., Disp: 2 tablet, Rfl: 0  •  FLUZONE QUADRIVALENT 0.5 ML suspension prefilled syringe injection, PHARMACIST ADMINISTERED IMMUNIZATION ADMINISTERED AT TIME OF DISPENSING, Disp: , Rfl: 0  •  Mirabegron ER (MYRBETRIQ) 50 MG tablet sustained-release 24 hour 24 hr tablet, Take 50 mg by mouth Daily., Disp: 14 tablet, Rfl: 0  •  Mirabegron ER (MYRBETRIQ) 50 MG tablet sustained-release 24 hour 24 hr tablet, Take 50 mg by mouth Daily., Disp: 30 tablet, Rfl: 2    Review of Systems   Constitutional: Negative for fatigue.   Respiratory: Negative for cough, shortness of breath and wheezing.    Cardiovascular: Negative for chest pain.   Gastrointestinal: Negative for abdominal pain, constipation, diarrhea, nausea and vomiting.   Genitourinary: Negative for dysuria and urgency.        Urinary odor and vaginal itching.   Skin: Negative.    Neurological: Negative for dizziness and headache.   Psychiatric/Behavioral: Negative for suicidal ideas and depressed mood. The patient is not nervous/anxious.        Objective   Vitals:    03/04/20 1347   BP: 100/60   Pulse: 68   Temp: 97.4 °F (36.3 °C)   SpO2: 97%   Weight: 80.7 kg (178 lb)   Height: 162.6 cm (64\")     Body mass index is 30.55 kg/m².  Physical Exam   Constitutional: She is oriented to person, place, and time. She appears well-developed and well-nourished.   Cardiovascular: Normal rate, regular rhythm, normal heart sounds and intact distal pulses.   Pulmonary/Chest: Effort normal and breath sounds normal.   Neurological: She is alert and oriented to person, place, and time.   Psychiatric: She has a normal mood and affect. Her behavior is normal. Judgment and thought content normal.         Assessment/Plan   Violet was seen today for follow-up, nasal congestion and pelvic pain.    Diagnoses and all orders for this " visit:    Dysuria  -     POC Urinalysis Dipstick  -     POCT Bacterial Vaginosis Rapid Test    Anxiety    Current moderate episode of major depressive disorder, unspecified whether recurrent (CMS/HCC)    Stress incontinence of urine    Yeast vaginitis    Other orders  -     fluconazole (DIFLUCAN) 150 MG tablet; Take 1 tablet by mouth 1 (One) Time for 1 dose. May repeat 1 time in 72 hours if necessary.  -     glucose blood test strip; Use as instructed  -     Mirabegron ER (MYRBETRIQ) 50 MG tablet sustained-release 24 hour 24 hr tablet; Take 50 mg by mouth Daily.  -     Mirabegron ER (MYRBETRIQ) 50 MG tablet sustained-release 24 hour 24 hr tablet; Take 50 mg by mouth Daily.    Discussed that the increase in glucose in urine can cause yeast infections.  Instructed her to stay clean all the time and decrease carbs and sugars in diet.  Will treat for yeast infection and if recurrent may need to consider change of medication.    Incontinence-we will trial Myrbetriq in place of Detrol.  Please let me know if any side effects to medication.    Anxiety and depression-well-controlled with citalopram.  Continue current dose.    Keep follow-up for 3-month checkup on diabetes and other chronic conditions.             Patient Instructions   Vaginal Yeast infection, Adult    Vaginal yeast infection is a condition that causes vaginal discharge as well as soreness, swelling, and redness (inflammation) of the vagina. This is a common condition. Some women get this infection frequently.  What are the causes?  This condition is caused by a change in the normal balance of the yeast (candida) and bacteria that live in the vagina. This change causes an overgrowth of yeast, which causes the inflammation.  What increases the risk?  The condition is more likely to develop in women who:  · Take antibiotic medicines.  · Have diabetes.  · Take birth control pills.  · Are pregnant.  · Douche often.  · Have a weak body defense system (immune  system).  · Have been taking steroid medicines for a long time.  · Frequently wear tight clothing.  What are the signs or symptoms?  Symptoms of this condition include:  · White, thick, creamy vaginal discharge.  · Swelling, itching, redness, and irritation of the vagina. The lips of the vagina (vulva) may be affected as well.  · Pain or a burning feeling while urinating.  · Pain during sex.  How is this diagnosed?  This condition is diagnosed based on:  · Your medical history.  · A physical exam.  · A pelvic exam. Your health care provider will examine a sample of your vaginal discharge under a microscope. Your health care provider may send this sample for testing to confirm the diagnosis.  How is this treated?  This condition is treated with medicine. Medicines may be over-the-counter or prescription. You may be told to use one or more of the following:  · Medicine that is taken by mouth (orally).  · Medicine that is applied as a cream (topically).  · Medicine that is inserted directly into the vagina (suppository).  Follow these instructions at home:    Lifestyle  · Do not have sex until your health care provider approves. Tell your sex partner that you have a yeast infection. That person should go to his or her health care provider and ask if they should also be treated.  · Do not wear tight clothes, such as pantyhose or tight pants.  · Wear breathable cotton underwear.  General instructions  · Take or apply over-the-counter and prescription medicines only as told by your health care provider.  · Eat more yogurt. This may help to keep your yeast infection from returning.  · Do not use tampons until your health care provider approves.  · Try taking a sitz bath to help with discomfort. This is a warm water bath that is taken while you are sitting down. The water should only come up to your hips and should cover your buttocks. Do this 3-4 times per day or as told by your health care provider.  · Do not  douche.  · If you have diabetes, keep your blood sugar levels under control.  · Keep all follow-up visits as told by your health care provider. This is important.  Contact a health care provider if:  · You have a fever.  · Your symptoms go away and then return.  · Your symptoms do not get better with treatment.  · Your symptoms get worse.  · You have new symptoms.  · You develop blisters in or around your vagina.  · You have blood coming from your vagina and it is not your menstrual period.  · You develop pain in your abdomen.  Summary  · Vaginal yeast infection is a condition that causes discharge as well as soreness, swelling, and redness (inflammation) of the vagina.  · This condition is treated with medicine. Medicines may be over-the-counter or prescription.  · Take or apply over-the-counter and prescription medicines only as told by your health care provider.  · Do not douche. Do not have sex or use tampons until your health care provider approves.  · Contact a health care provider if your symptoms do not get better with treatment or your symptoms go away and then return.  This information is not intended to replace advice given to you by your health care provider. Make sure you discuss any questions you have with your health care provider.  Document Released: 09/27/2006 Document Revised: 05/06/2019 Document Reviewed: 05/06/2019  [employer] Patient Education © 2020 [employer]  Living With Anxiety    After being diagnosed with an anxiety disorder, you may be relieved to know why you have felt or behaved a certain way. It is natural to also feel overwhelmed about the treatment ahead and what it will mean for your life. With care and support, you can manage this condition and recover from it.  How to cope with anxiety  Dealing with stress  Stress is your body’s reaction to life changes and events, both good and bad. Stress can last just a few hours or it can be ongoing. Stress can play a major role  in anxiety, so it is important to learn both how to cope with stress and how to think about it differently.  Talk with your health care provider or a counselor to learn more about stress reduction. He or she may suggest some stress reduction techniques, such as:  · Music therapy. This can include creating or listening to music that you enjoy and that inspires you.  · Mindfulness-based meditation. This involves being aware of your normal breaths, rather than trying to control your breathing. It can be done while sitting or walking.  · Centering prayer. This is a kind of meditation that involves focusing on a word, phrase, or sacred image that is meaningful to you and that brings you peace.  · Deep breathing. To do this, expand your stomach and inhale slowly through your nose. Hold your breath for 3-5 seconds. Then exhale slowly, allowing your stomach muscles to relax.  · Self-talk. This is a skill where you identify thought patterns that lead to anxiety reactions and correct those thoughts.  · Muscle relaxation. This involves tensing muscles then relaxing them.  Choose a stress reduction technique that fits your lifestyle and personality. Stress reduction techniques take time and practice. Set aside 5-15 minutes a day to do them. Therapists can offer training in these techniques. The training may be covered by some insurance plans. Other things you can do to manage stress include:  · Keeping a stress diary. This can help you learn what triggers your stress and ways to control your response.  · Thinking about how you respond to certain situations. You may not be able to control everything, but you can control your reaction.  · Making time for activities that help you relax, and not feeling guilty about spending your time in this way.  Therapy combined with coping and stress-reduction skills provides the best chance for successful treatment.  Medicines  Medicines can help ease symptoms. Medicines for anxiety  include:  · Anti-anxiety drugs.  · Antidepressants.  · Beta-blockers.  Medicines may be used as the main treatment for anxiety disorder, along with therapy, or if other treatments are not working. Medicines should be prescribed by a health care provider.  Relationships  Relationships can play a big part in helping you recover. Try to spend more time connecting with trusted friends and family members. Consider going to couples counseling, taking family education classes, or going to family therapy. Therapy can help you and others better understand the condition.  How to recognize changes in your condition  Everyone has a different response to treatment for anxiety. Recovery from anxiety happens when symptoms decrease and stop interfering with your daily activities at home or work. This may mean that you will start to:  · Have better concentration and focus.  · Sleep better.  · Be less irritable.  · Have more energy.  · Have improved memory.  It is important to recognize when your condition is getting worse. Contact your health care provider if your symptoms interfere with home or work and you do not feel like your condition is improving.  Where to find help and support:  You can get help and support from these sources:  · Self-help groups.  · Online and community organizations.  · A trusted spiritual leader.  · Couples counseling.  · Family education classes.  · Family therapy.  Follow these instructions at home:  · Eat a healthy diet that includes plenty of vegetables, fruits, whole grains, low-fat dairy products, and lean protein. Do not eat a lot of foods that are high in solid fats, added sugars, or salt.  · Exercise. Most adults should do the following:  ? Exercise for at least 150 minutes each week. The exercise should increase your heart rate and make you sweat (moderate-intensity exercise).  ? Strengthening exercises at least twice a week.  · Cut down on caffeine, tobacco, alcohol, and other potentially  harmful substances.  · Get the right amount and quality of sleep. Most adults need 7-9 hours of sleep each night.  · Make choices that simplify your life.  · Take over-the-counter and prescription medicines only as told by your health care provider.  · Avoid caffeine, alcohol, and certain over-the-counter cold medicines. These may make you feel worse. Ask your pharmacist which medicines to avoid.  · Keep all follow-up visits as told by your health care provider. This is important.  Questions to ask your health care provider  · Would I benefit from therapy?  · How often should I follow up with a health care provider?  · How long do I need to take medicine?  · Are there any long-term side effects of my medicine?  · Are there any alternatives to taking medicine?  Contact a health care provider if:  · You have a hard time staying focused or finishing daily tasks.  · You spend many hours a day feeling worried about everyday life.  · You become exhausted by worry.  · You start to have headaches, feel tense, or have nausea.  · You urinate more than normal.  · You have diarrhea.  Get help right away if:  · You have a racing heart and shortness of breath.  · You have thoughts of hurting yourself or others.  If you ever feel like you may hurt yourself or others, or have thoughts about taking your own life, get help right away. You can go to your nearest emergency department or call:  · Your local emergency services (911 in the [place]  · A suicide crisis helpline, such as the National Suicide Prevention Lifeline at 1-229.433.1399. This is open 24-hours a day.  Summary  · Taking steps to deal with stress can help calm you.  · Medicines cannot cure anxiety disorders, but they can help ease symptoms.  · Family, friends, and partners can play a big part in helping you recover from an anxiety disorder.  This information is not intended to replace advice given to you by your health care provider. Make sure you discuss any questions  you have with your health care provider.  Document Released: 12/12/2017 Document Revised: 12/12/2017 Document Reviewed: 12/12/2017  [employer] Interactive Patient Education © 2020 [employer]

## 2020-03-22 DIAGNOSIS — G47.09 OTHER INSOMNIA: ICD-10-CM

## 2020-03-22 DIAGNOSIS — Z79.899 LONG-TERM USE OF HIGH-RISK MEDICATION: ICD-10-CM

## 2020-03-23 ENCOUNTER — OFFICE VISIT (OUTPATIENT)
Dept: FAMILY MEDICINE CLINIC | Facility: CLINIC | Age: 60
End: 2020-03-23

## 2020-03-23 VITALS
TEMPERATURE: 97.4 F | DIASTOLIC BLOOD PRESSURE: 72 MMHG | OXYGEN SATURATION: 98 % | WEIGHT: 175 LBS | HEIGHT: 64 IN | SYSTOLIC BLOOD PRESSURE: 112 MMHG | HEART RATE: 75 BPM | BODY MASS INDEX: 29.88 KG/M2

## 2020-03-23 DIAGNOSIS — J30.9 ALLERGIC RHINITIS, UNSPECIFIED SEASONALITY, UNSPECIFIED TRIGGER: ICD-10-CM

## 2020-03-23 DIAGNOSIS — G47.09 OTHER INSOMNIA: ICD-10-CM

## 2020-03-23 DIAGNOSIS — Z79.899 LONG-TERM USE OF HIGH-RISK MEDICATION: ICD-10-CM

## 2020-03-23 DIAGNOSIS — E11.42 DIABETIC POLYNEUROPATHY ASSOCIATED WITH TYPE 2 DIABETES MELLITUS (HCC): ICD-10-CM

## 2020-03-23 DIAGNOSIS — G43.709 CHRONIC MIGRAINE WITHOUT AURA WITHOUT STATUS MIGRAINOSUS, NOT INTRACTABLE: Primary | ICD-10-CM

## 2020-03-23 PROCEDURE — 99214 OFFICE O/P EST MOD 30 MIN: CPT | Performed by: NURSE PRACTITIONER

## 2020-03-23 RX ORDER — ZOLPIDEM TARTRATE 10 MG/1
10 TABLET ORAL NIGHTLY PRN
Qty: 30 TABLET | Refills: 0 | Status: SHIPPED | OUTPATIENT
Start: 2020-03-23 | End: 2020-03-23 | Stop reason: SDUPTHER

## 2020-03-23 RX ORDER — MULTIVIT-MIN/IRON FUM/FOLIC AC 7.5 MG-4
1 TABLET ORAL DAILY
Qty: 30 TABLET | Refills: 11 | Status: SHIPPED | OUTPATIENT
Start: 2020-03-23 | End: 2020-09-22

## 2020-03-23 RX ORDER — RIZATRIPTAN BENZOATE 10 MG/1
10 TABLET, ORALLY DISINTEGRATING ORAL ONCE AS NEEDED
Qty: 9 TABLET | Refills: 5 | Status: SHIPPED | OUTPATIENT
Start: 2020-03-23 | End: 2020-08-31

## 2020-03-23 RX ORDER — AMOXICILLIN 875 MG/1
875 TABLET, COATED ORAL EVERY 12 HOURS
COMMUNITY
Start: 2020-03-05 | End: 2020-06-22

## 2020-03-23 RX ORDER — ZOLPIDEM TARTRATE 10 MG/1
10 TABLET ORAL NIGHTLY PRN
Qty: 30 TABLET | Refills: 1 | Status: SHIPPED | OUTPATIENT
Start: 2020-03-23 | End: 2020-06-22 | Stop reason: SDUPTHER

## 2020-03-23 RX ORDER — GABAPENTIN 300 MG/1
CAPSULE ORAL
COMMUNITY
Start: 2020-03-13 | End: 2020-03-23

## 2020-03-23 RX ORDER — PREGABALIN 100 MG/1
100 CAPSULE ORAL 2 TIMES DAILY
Qty: 60 CAPSULE | Refills: 2 | Status: SHIPPED | OUTPATIENT
Start: 2020-03-23 | End: 2020-06-22

## 2020-03-23 RX ORDER — TOPIRAMATE 50 MG/1
50 TABLET, FILM COATED ORAL
Qty: 30 TABLET | Refills: 5 | Status: SHIPPED | OUTPATIENT
Start: 2020-03-23 | End: 2020-06-22

## 2020-03-23 RX ORDER — FLUTICASONE PROPIONATE 50 MCG
2 SPRAY, SUSPENSION (ML) NASAL DAILY
Qty: 16 G | Refills: 11 | Status: SHIPPED | OUTPATIENT
Start: 2020-03-23

## 2020-03-23 RX ORDER — MULTIVIT WITH MINERALS/LUTEIN
250 TABLET ORAL DAILY
Qty: 30 TABLET | Refills: 11 | Status: SHIPPED | OUTPATIENT
Start: 2020-03-23 | End: 2020-09-22

## 2020-03-23 NOTE — PROGRESS NOTES
Subjective   Violet Rahman is a 60 y.o. female.     Chief Complaint   Patient presents with   • Insomnia     NEEDS REFILL        History of Present Illness       Patient is here today for follow up on insomnia.  States ambien is working well for her.  Denies any side effects to medication.      RICARDO-today  UDS-today  Consent-10/31/2019    States that her legs are hurting all day and wants to trial lyrica in place of the gabapentin and see if that works.       Migraines-this is a new complaint to me.  Patient states that she wakes up each day with a migraine headache.  She states this is been happening for a while.  She is never taken anything for her migraines.    The following portions of the patient's history were reviewed and updated as appropriate: allergies, current medications, past family history, past medical history, past social history, past surgical history and problem list.    Past Medical History:   Diagnosis Date   • Anxiety    • Depression    • Diabetes (CMS/HCC)    • Hypertension    • Hypothyroidism    • OA (osteoarthritis)    • OAB (overactive bladder)    • AMELIA (obstructive sleep apnea)    • Stomach ulcer        Past Surgical History:   Procedure Laterality Date   •  SECTION      X2   • COLONOSCOPY N/A 2016    Procedure: COLONOSCOPY;  Surgeon: Raiza Talbot MD;  Location: [facility];  Service:    • ENDOSCOPY N/A 2016    Procedure: ESOPHAGOGASTRODUODENOSCOPY with biopsies  and link test;  Surgeon: Raiza Talbot MD;  Location: [facility];  Service:    • KNEE SURGERY Right    • LAPAROSCOPIC CHOLECYSTECTOMY         Family History   Problem Relation Age of Onset   • Hypertension Mother    • Stroke Mother    • Diabetes Mother    • Lung cancer Father    • Diabetes Sister    • Colon cancer Sister        Social History     Socioeconomic History   • Marital status: Single     Spouse name: Not on file   • Number of children: Not on file   • Years of education: Not on file   • Highest  education level: Not on file   Tobacco Use   • Smoking status: Never Smoker   • Smokeless tobacco: Never Used   Substance and Sexual Activity   • Alcohol use: No   • Drug use: No   • Sexual activity: Defer         Current Outpatient Medications:   •  amoxicillin (AMOXIL) 875 MG tablet, Take 875 mg by mouth Every 12 (Twelve) Hours., Disp: , Rfl:   •  atenolol (TENORMIN) 25 MG tablet, Take 1 tablet by mouth Daily., Disp: 90 tablet, Rfl: 0  •  BREO ELLIPTA 100-25 MCG/INH inhaler, Inhale 1 puff Daily. Rinse mouth after each use, Disp: , Rfl: 11  •  cetirizine (zyrTEC) 10 MG tablet, Take 10 mg by mouth Daily. as directed, Disp: , Rfl:   •  fluticasone (FLONASE) 50 MCG/ACT nasal spray, 2 sprays into the nostril(s) as directed by provider Daily., Disp: 16 g, Rfl: 11  •  FLUZONE QUADRIVALENT 0.5 ML suspension prefilled syringe injection, PHARMACIST ADMINISTERED IMMUNIZATION ADMINISTERED AT TIME OF DISPENSING, Disp: , Rfl: 0  •  glipizide (GLUCOTROL) 10 MG tablet, Take 1 tablet by mouth twice daily, Disp: 180 tablet, Rfl: 0  •  glucose blood test strip, Use as instructed, Disp: 200 each, Rfl: 2  •  HYDROcodone-acetaminophen (NORCO) 7.5-325 MG per tablet, , Disp: , Rfl:   •  levothyroxine (SYNTHROID, LEVOTHROID) 125 MCG tablet, TAKE 1 TABLET BY MOUTH ONCE DAILY, Disp: 30 tablet, Rfl: 2  •  linaclotide (LINZESS) 145 MCG capsule capsule, Take 1 capsule by mouth Every Morning Before Breakfast., Disp: 30 capsule, Rfl: 5  •  metFORMIN (GLUCOPHAGE) 1000 MG tablet, Take 1 tablet by mouth 2 (Two) Times a Day With Meals., Disp: 180 tablet, Rfl: 0  •  Mirabegron ER (MYRBETRIQ) 50 MG tablet sustained-release 24 hour 24 hr tablet, Take 50 mg by mouth Daily., Disp: 14 tablet, Rfl: 0  •  ondansetron ODT (ZOFRAN-ODT) 4 MG disintegrating tablet, Take 1 tablet by mouth Every 8 (Eight) Hours As Needed for Nausea or Vomiting., Disp: 12 tablet, Rfl: 2  •  PROAIR  (90 Base) MCG/ACT inhaler, Inhale See Admin Instructions. Inhale 2 puffs by  "mouth every 4 to 6 hours as needed, Disp: , Rfl: 3  •  rosuvastatin (CRESTOR) 10 MG tablet, Take 1 tablet by mouth Daily., Disp: 30 tablet, Rfl: 2  •  zolpidem (AMBIEN) 10 MG tablet, Take 1 tablet by mouth At Night As Needed for Sleep., Disp: 30 tablet, Rfl: 1  •  Multiple Vitamins-Minerals (MULTIVITAMIN WITH MINERALS) tablet, Take 1 tablet by mouth Daily., Disp: 30 tablet, Rfl: 11  •  pregabalin (LYRICA) 100 MG capsule, Take 1 capsule by mouth 2 (Two) Times a Day., Disp: 60 capsule, Rfl: 2  •  rizatriptan MLT (MAXALT-MLT) 10 MG disintegrating tablet, Place 1 tablet on the tongue 1 (One) Time As Needed for Migraine for up to 1 dose. May repeat in 2 hours if needed, Disp: 9 tablet, Rfl: 5  •  topiramate (TOPAMAX) 50 MG tablet, Take 1 tablet by mouth every night at bedtime., Disp: 30 tablet, Rfl: 5  •  vitamin C (ASCORBIC ACID) 250 MG tablet, Take 1 tablet by mouth Daily., Disp: 30 tablet, Rfl: 11    Review of Systems   Constitutional: Negative for fatigue and fever.   Respiratory: Negative for cough, shortness of breath and wheezing.    Cardiovascular: Negative for chest pain.   Gastrointestinal: Negative for abdominal pain, constipation, nausea and vomiting.   Musculoskeletal:        Leg pain   Psychiatric/Behavioral: Positive for sleep disturbance. Negative for suicidal ideas and depressed mood. The patient is not nervous/anxious.        Objective   Vitals:    03/23/20 1303   BP: 112/72   Pulse: 75   Temp: 97.4 °F (36.3 °C)   SpO2: 98%   Weight: 79.4 kg (175 lb)   Height: 162.6 cm (64\")     Body mass index is 30.04 kg/m².  Physical Exam   Constitutional: She is oriented to person, place, and time. She appears well-developed and well-nourished.   Cardiovascular: Normal rate, regular rhythm and normal heart sounds.   Pulmonary/Chest: Effort normal and breath sounds normal.   Neurological: She is alert and oriented to person, place, and time.   Psychiatric: She has a normal mood and affect. Her behavior is normal. " Judgment and thought content normal.         Assessment/Plan   Violet was seen today for insomnia.    Diagnoses and all orders for this visit:    Chronic migraine without aura without status migrainosus, not intractable    Long-term use of high-risk medication  -     zolpidem (AMBIEN) 10 MG tablet; Take 1 tablet by mouth At Night As Needed for Sleep.  -     Drug Profile [id number] - Urine, Clean Catch  -     pregabalin (LYRICA) 100 MG capsule; Take 1 capsule by mouth 2 (Two) Times a Day.    Other insomnia  -     zolpidem (AMBIEN) 10 MG tablet; Take 1 tablet by mouth At Night As Needed for Sleep.    Diabetic polyneuropathy associated with type 2 diabetes mellitus (CMS/HCC)  -     pregabalin (LYRICA) 100 MG capsule; Take 1 capsule by mouth 2 (Two) Times a Day.    Allergic rhinitis, unspecified seasonality, unspecified trigger    Other orders  -     vitamin C (ASCORBIC ACID) 250 MG tablet; Take 1 tablet by mouth Daily.  -     Multiple Vitamins-Minerals (MULTIVITAMIN WITH MINERALS) tablet; Take 1 tablet by mouth Daily.  -     fluticasone (FLONASE) 50 MCG/ACT nasal spray; 2 sprays into the nostril(s) as directed by provider Daily.  -     topiramate (TOPAMAX) 50 MG tablet; Take 1 tablet by mouth every night at bedtime.  -     rizatriptan MLT (MAXALT-MLT) 10 MG disintegrating tablet; Place 1 tablet on the tongue 1 (One) Time As Needed for Migraine for up to 1 dose. May repeat in 2 hours if needed        Trial Lyrica in place of gabapentin.  We will follow-up on this in 3 months.    Migraines-we will trial Topamax to see if this helps.  We will also trial Maxalt as needed.  We will follow-up on this in 3 months.    Insomnia-continue Ambien.  We did discuss the need for decrease in Ambien dose as she gets older and we may want to consider a different medication.  Patient verbalizes understanding but is unwilling to take on a plan as of today.    Allergies-continue Flonase.             There are no Patient Instructions on file for  this visit.

## 2020-03-24 LAB
AMPHETAMINES UR QL SCN: NEGATIVE NG/ML
BARBITURATES UR QL: NEGATIVE NG/ML
BENZODIAZ UR QL SCN: NEGATIVE NG/ML
BZE UR QL: NEGATIVE NG/ML
CANNABINOIDS UR QL SCN: NEGATIVE NG/ML
CREAT UR-MCNC: 97.3 MG/DL (ref 20–300)
ETHANOL UR-MCNC: NEGATIVE %
MEPERIDINE UR QL: NEGATIVE NG/ML
METHADONE UR QL: NEGATIVE NG/ML
OPIATES UR QL SCN: NEGATIVE NG/ML
OXYCODONE+OXYMORPHONE UR QL SCN: NEGATIVE NG/ML
PCP UR QL: NEGATIVE NG/ML
PROPOXYPH UR QL: NEGATIVE NG/ML
TRAMADOL UR QL SCN: NEGATIVE NG/ML

## 2020-03-31 RX ORDER — ATENOLOL 25 MG/1
TABLET ORAL
Qty: 90 TABLET | Refills: 0 | OUTPATIENT
Start: 2020-03-31

## 2020-04-02 RX ORDER — ATENOLOL 25 MG/1
TABLET ORAL
Qty: 90 TABLET | Refills: 0 | Status: SHIPPED | OUTPATIENT
Start: 2020-04-02 | End: 2020-06-26

## 2020-04-16 RX ORDER — GLIPIZIDE 10 MG/1
TABLET ORAL
Qty: 180 TABLET | Refills: 0 | Status: SHIPPED | OUTPATIENT
Start: 2020-04-16 | End: 2020-06-22

## 2020-05-04 RX ORDER — LEVOTHYROXINE SODIUM 0.12 MG/1
TABLET ORAL
Qty: 90 TABLET | Refills: 0 | Status: SHIPPED | OUTPATIENT
Start: 2020-05-04 | End: 2020-06-24

## 2020-05-05 RX ORDER — LEVOTHYROXINE SODIUM 0.12 MG/1
125 TABLET ORAL DAILY
Qty: 90 TABLET | Refills: 0 | Status: CANCELLED | OUTPATIENT
Start: 2020-05-05

## 2020-05-05 NOTE — TELEPHONE ENCOUNTER
Patient called in requesting a re-fill for levothyroxine (SYNTHROID, LEVOTHROID) 125 MCG tablet    [facility] [address]    Patient call back [phone]

## 2020-05-11 ENCOUNTER — TELEMEDICINE (OUTPATIENT)
Dept: FAMILY MEDICINE CLINIC | Facility: CLINIC | Age: 60
End: 2020-05-11

## 2020-05-11 DIAGNOSIS — F41.9 ANXIETY: ICD-10-CM

## 2020-05-11 DIAGNOSIS — F32.1 CURRENT MODERATE EPISODE OF MAJOR DEPRESSIVE DISORDER, UNSPECIFIED WHETHER RECURRENT (HCC): ICD-10-CM

## 2020-05-11 DIAGNOSIS — J30.9 ALLERGIC RHINITIS, UNSPECIFIED SEASONALITY, UNSPECIFIED TRIGGER: Primary | ICD-10-CM

## 2020-05-11 PROCEDURE — 99442 PR PHYS/QHP TELEPHONE EVALUATION 11-20 MIN: CPT | Performed by: NURSE PRACTITIONER

## 2020-05-11 RX ORDER — CITALOPRAM 20 MG/1
20 TABLET ORAL DAILY
COMMUNITY
End: 2020-05-11

## 2020-05-11 NOTE — PROGRESS NOTES
Subjective   Violet Rahman is a 60 y.o. female.     Chief Complaint   Patient presents with   • Depression     follow up         History of Present Illness     Patient presents today via tele-health video visit via [place], but she is unable to get on to [place], so I called her via telephone visit.    She states that she doesn't feel like the citalopram is working.   She took them for about a month and a half and states she felt no different, so she stopped taking them.       She is requesting Cymbalta.   She states her sister said it may work for her, that she has taken it before and it worked for her.       She denies any SI or HI, just feeling sad all the time.       She also feels like she has sinus pressure in one side of face.   She states she isn't taking the allergy medication, she just takes the shot.       The following portions of the patient's history were reviewed and updated as appropriate: allergies, current medications, past family history, past medical history, past social history, past surgical history and problem list.    Past Medical History:   Diagnosis Date   • Anxiety    • Depression    • Diabetes (CMS/HCC)    • Hypertension    • Hypothyroidism    • OA (osteoarthritis)    • OAB (overactive bladder)    • AMELIA (obstructive sleep apnea)    • Stomach ulcer        Past Surgical History:   Procedure Laterality Date   •  SECTION      X2   • COLONOSCOPY N/A 2016    Procedure: COLONOSCOPY;  Surgeon: Raiza Talbot MD;  Location: [facility];  Service:    • ENDOSCOPY N/A 2016    Procedure: ESOPHAGOGASTRODUODENOSCOPY with biopsies  and link test;  Surgeon: Raiza Talbot MD;  Location: [facility];  Service:    • KNEE SURGERY Right    • LAPAROSCOPIC CHOLECYSTECTOMY         Family History   Problem Relation Age of Onset   • Hypertension Mother    • Stroke Mother    • Diabetes Mother    • Lung cancer Father    • Diabetes Sister    • Colon cancer Sister        Social History      Socioeconomic History   • Marital status: Single     Spouse name: Not on file   • Number of children: Not on file   • Years of education: Not on file   • Highest education level: Not on file   Tobacco Use   • Smoking status: Never Smoker   • Smokeless tobacco: Never Used   Substance and Sexual Activity   • Alcohol use: No   • Drug use: No   • Sexual activity: Defer         Current Outpatient Medications:   •  amoxicillin (AMOXIL) 875 MG tablet, Take 875 mg by mouth Every 12 (Twelve) Hours., Disp: , Rfl:   •  atenolol (TENORMIN) 25 MG tablet, Take 1 tablet by mouth once daily, Disp: 90 tablet, Rfl: 0  •  BREO ELLIPTA 100-25 MCG/INH inhaler, Inhale 1 puff Daily. Rinse mouth after each use, Disp: , Rfl: 11  •  cetirizine (zyrTEC) 10 MG tablet, Take 10 mg by mouth Daily. as directed, Disp: , Rfl:   •  fluticasone (FLONASE) 50 MCG/ACT nasal spray, 2 sprays into the nostril(s) as directed by provider Daily., Disp: 16 g, Rfl: 11  •  FLUZONE QUADRIVALENT 0.5 ML suspension prefilled syringe injection, PHARMACIST ADMINISTERED IMMUNIZATION ADMINISTERED AT TIME OF DISPENSING, Disp: , Rfl: 0  •  glipizide (GLUCOTROL) 10 MG tablet, Take 1 tablet by mouth twice daily, Disp: 180 tablet, Rfl: 0  •  glucose blood test strip, Use as instructed, Disp: 200 each, Rfl: 2  •  HYDROcodone-acetaminophen (NORCO) 7.5-325 MG per tablet, , Disp: , Rfl:   •  levothyroxine (SYNTHROID, LEVOTHROID) 125 MCG tablet, Take 1 tablet by mouth once daily, Disp: 90 tablet, Rfl: 0  •  linaclotide (LINZESS) 145 MCG capsule capsule, Take 1 capsule by mouth Every Morning Before Breakfast., Disp: 30 capsule, Rfl: 5  •  metFORMIN (GLUCOPHAGE) 1000 MG tablet, Take 1 tablet by mouth 2 (Two) Times a Day With Meals., Disp: 180 tablet, Rfl: 0  •  Mirabegron ER (MYRBETRIQ) 50 MG tablet sustained-release 24 hour 24 hr tablet, Take 50 mg by mouth Daily., Disp: 14 tablet, Rfl: 0  •  Multiple Vitamins-Minerals (MULTIVITAMIN WITH MINERALS) tablet, Take 1 tablet by mouth  Daily., Disp: 30 tablet, Rfl: 11  •  ondansetron ODT (ZOFRAN-ODT) 4 MG disintegrating tablet, Take 1 tablet by mouth Every 8 (Eight) Hours As Needed for Nausea or Vomiting., Disp: 12 tablet, Rfl: 2  •  pregabalin (LYRICA) 100 MG capsule, Take 1 capsule by mouth 2 (Two) Times a Day., Disp: 60 capsule, Rfl: 2  •  PROAIR  (90 Base) MCG/ACT inhaler, Inhale See Admin Instructions. Inhale 2 puffs by mouth every 4 to 6 hours as needed, Disp: , Rfl: 3  •  rizatriptan MLT (MAXALT-MLT) 10 MG disintegrating tablet, Place 1 tablet on the tongue 1 (One) Time As Needed for Migraine for up to 1 dose. May repeat in 2 hours if needed, Disp: 9 tablet, Rfl: 5  •  rosuvastatin (CRESTOR) 10 MG tablet, Take 1 tablet by mouth Daily., Disp: 30 tablet, Rfl: 2  •  topiramate (TOPAMAX) 50 MG tablet, Take 1 tablet by mouth every night at bedtime., Disp: 30 tablet, Rfl: 5  •  vitamin C (ASCORBIC ACID) 250 MG tablet, Take 1 tablet by mouth Daily., Disp: 30 tablet, Rfl: 11  •  zolpidem (AMBIEN) 10 MG tablet, Take 1 tablet by mouth At Night As Needed for Sleep., Disp: 30 tablet, Rfl: 1    Review of Systems   Constitutional: Negative for fatigue and fever.   HENT: Positive for congestion, rhinorrhea and sinus pressure. Negative for sore throat.    Respiratory: Negative for cough, shortness of breath and wheezing.    Cardiovascular: Negative for chest pain.   Gastrointestinal: Negative for abdominal pain, constipation, diarrhea, nausea and vomiting.   Psychiatric/Behavioral: Positive for depressed mood (feels like she could cry all the time, feels lonely during the pandemic). Negative for sleep disturbance and suicidal ideas. The patient is nervous/anxious.        Objective   There were no vitals filed for this visit.  There is no height or weight on file to calculate BMI.  Physical Exam  Telephone visit-unable to assess    Assessment/Plan   Violet was seen today for depression.    Diagnoses and all orders for this visit:    Allergic rhinitis,  unspecified seasonality, unspecified trigger    Anxiety    Current moderate episode of major depressive disorder, unspecified whether recurrent (CMS/HCC)      Allergies: suggested to patient she start back on flonase and zyrtec and discuss ongoing symptoms with her allergist.  She just had antibiotic at the end of march.       DEPRESSION-okay with starting trial of cymbalta.  Keep follow up on 6/24 and we will discuss this as well.  If any worsening mood, notify provider.  If any SI or HI go to ER    [place] spend with patient 12 minutes.           Patient Instructions   Major Depressive Disorder, Adult  Major depressive disorder (MDD) is a mental health condition. MDD often makes you feel sad, hopeless, or helpless. MDD can also cause symptoms in your body. MDD can affect your:  · Work.  · School.  · Relationships.  · Other normal activities.  MDD can range from mild to very bad. It may occur once (single episode MDD). It can also occur many times (recurrent MDD).  The main symptoms of MDD often include:  · Feeling sad, depressed, or irritable most of the time.  · Loss of interest.  MDD symptoms also include:  · Sleeping too much or too little.  · Eating too much or too little.  · A change in your weight.  · Feeling tired (fatigue) or having low energy.  · Feeling worthless.  · Feeling guilty.  · Trouble making decisions.  · Trouble thinking clearly.  · Thoughts of suicide or harming others.  · Feeling weak.  · Feeling agitated.  · Keeping yourself from being around other people (isolation).  Follow these instructions at home:  Activity  · Do these things as told by your doctor:  ? Go back to your normal activities.  ? Exercise regularly.  ? Spend time outdoors.  Alcohol  · Talk with your doctor about how alcohol can affect your antidepressant medicines.  · Do not drink alcohol. Or, limit how much alcohol you drink.  ? This means no more than 1 drink a day for nonpregnant women and 2 drinks a day for men. One drink  equals one of these:  § 12 oz of beer.  § 5 oz of wine.  § 1½ oz of hard liquor.  General instructions  · Take over-the-counter and prescription medicines only as told by your doctor.  · Eat a healthy diet.  · Get plenty of sleep.  · Find activities that you enjoy. Make time to do them.  · Think about joining a support group. Your doctor may be able to suggest a group for you.  · Keep all follow-up visits as told by your doctor. This is important.  Where to find more information:  · National [place] on Mental Illness:  ? www.[place].org  · [place] National [place] of Mental Health:  ? www.[facility].nih.gov  · National Suicide Prevention Lifeline:  ? 1-100.282.2627. This is free, 24-hour help.  Contact a doctor if:  · Your symptoms get worse.  · You have new symptoms.  Get help right away if:  · You self-harm.  · You see, hear, taste, smell, or feel things that are not present (hallucinate).  If you ever feel like you may hurt yourself or others, or have thoughts about taking your own life, get help right away. You can go to your nearest emergency department or call:  · Your local emergency services (911 in the [place]  · A suicide crisis helpline, such as the National Suicide Prevention Lifeline:  ? 1-939.831.3693. This is open 24 hours a day.  This information is not intended to replace advice given to you by your health care provider. Make sure you discuss any questions you have with your health care provider.  Document Released: 11/28/2016 Document Revised: 09/03/2017 Document Reviewed: 09/03/2017  Elsevier Interactive Patient Education © 2020 [employer]  Allergic Rhinitis, Adult  Allergic rhinitis is an allergic reaction that affects the mucous membrane inside the nose. It causes sneezing, a runny or stuffy nose, and the feeling of mucus going down the back of the throat (postnasal drip). Allergic rhinitis can be mild to severe.  There are two types of allergic rhinitis:  · Seasonal. This type is also called hay  fever. It happens only during certain seasons.  · Perennial. This type can happen at any time of the year.  What are the causes?  This condition happens when the body's defense system (immune system) responds to certain harmless substances called allergens as though they were germs.   Seasonal allergic rhinitis is triggered by pollen, which can come from grasses, trees, and weeds. Perennial allergic rhinitis may be caused by:  · House dust mites.  · Pet dander.  · Mold spores.  What are the signs or symptoms?  Symptoms of this condition include:  · Sneezing.  · Runny or stuffy nose (nasal congestion).  · Postnasal drip.  · Itchy nose.  · Tearing of the eyes.  · Trouble sleeping.  · Daytime sleepiness.  How is this diagnosed?  This condition may be diagnosed based on:  · Your medical history.  · A physical exam.  · Tests to check for related conditions, such as:  ? Asthma.  ? Pink eye.  ? Ear infection.  ? Upper respiratory infection.  · Tests to find out which allergens trigger your symptoms. These may include skin or blood tests.  How is this treated?  There is no cure for this condition, but treatment can help control symptoms. Treatment may include:  · Taking medicines that block allergy symptoms, such as antihistamines. Medicine may be given as a shot, nasal spray, or pill.  · Avoiding the allergen.  · Desensitization. This treatment involves getting ongoing shots until your body becomes less sensitive to the allergen. This treatment may be done if other treatments do not help.  · If taking medicine and avoiding the allergen does not work, new, stronger medicines may be prescribed.  Follow these instructions at home:  · Find out what you are allergic to. Common allergens include smoke, dust, and pollen.  · Avoid the things you are allergic to. These are some things you can do to help avoid allergens:  ? Replace carpet with wood, tile, or vinyl petr. Carpet can trap dander and dust.  ? Do not smoke. Do not  allow smoking in your home.  ? Change your heating and air conditioning filter at least once a month.  ? During allergy season:  § Keep windows closed as much as possible.  § Plan outdoor activities when pollen counts are lowest. This is usually during the evening hours.  § When coming indoors, change clothing and shower before sitting on furniture or bedding.  · Take over-the-counter and prescription medicines only as told by your health care provider.  · Keep all follow-up visits as told by your health care provider. This is important.  Contact a health care provider if:  · You have a fever.  · You develop a persistent cough.  · You make whistling sounds when you breathe (you wheeze).  · Your symptoms interfere with your normal daily activities.  Get help right away if:  · You have shortness of breath.  Summary  · This condition can be managed by taking medicines as directed and avoiding allergens.  · Contact your health care provider if you develop a persistent cough or fever.  · During allergy season, keep windows closed as much as possible.  This information is not intended to replace advice given to you by your health care provider. Make sure you discuss any questions you have with your health care provider.  Document Released: 09/12/2002 Document Revised: 01/25/2018 Document Reviewed: 01/25/2018  Elsevier Interactive Patient Education © 2020 [employer] Inc[employer]

## 2020-05-11 NOTE — PATIENT INSTRUCTIONS
Major Depressive Disorder, Adult  Major depressive disorder (MDD) is a mental health condition. MDD often makes you feel sad, hopeless, or helpless. MDD can also cause symptoms in your body. MDD can affect your:  · Work.  · School.  · Relationships.  · Other normal activities.  MDD can range from mild to very bad. It may occur once (single episode MDD). It can also occur many times (recurrent MDD).  The main symptoms of MDD often include:  · Feeling sad, depressed, or irritable most of the time.  · Loss of interest.  MDD symptoms also include:  · Sleeping too much or too little.  · Eating too much or too little.  · A change in your weight.  · Feeling tired (fatigue) or having low energy.  · Feeling worthless.  · Feeling guilty.  · Trouble making decisions.  · Trouble thinking clearly.  · Thoughts of suicide or harming others.  · Feeling weak.  · Feeling agitated.  · Keeping yourself from being around other people (isolation).  Follow these instructions at home:  Activity  · Do these things as told by your doctor:  ? Go back to your normal activities.  ? Exercise regularly.  ? Spend time outdoors.  Alcohol  · Talk with your doctor about how alcohol can affect your antidepressant medicines.  · Do not drink alcohol. Or, limit how much alcohol you drink.  ? This means no more than 1 drink a day for nonpregnant women and 2 drinks a day for men. One drink equals one of these:  § 12 oz of beer.  § 5 oz of wine.  § 1½ oz of hard liquor.  General instructions  · Take over-the-counter and prescription medicines only as told by your doctor.  · Eat a healthy diet.  · Get plenty of sleep.  · Find activities that you enjoy. Make time to do them.  · Think about joining a support group. Your doctor may be able to suggest a group for you.  · Keep all follow-up visits as told by your doctor. This is important.  Where to find more information:  · National [place] on Mental Illness:  ? www.gu[place]ermo.org  · [place] National [place] of Mental  Health:  ? www.[facility].gov  · National Suicide Prevention Lifeline:  ? 1-275.531.3620. This is free, 24-hour help.  Contact a doctor if:  · Your symptoms get worse.  · You have new symptoms.  Get help right away if:  · You self-harm.  · You see, hear, taste, smell, or feel things that are not present (hallucinate).  If you ever feel like you may hurt yourself or others, or have thoughts about taking your own life, get help right away. You can go to your nearest emergency department or call:  · Your local emergency services (911 in the [place]  · A suicide crisis helpline, such as the National Suicide Prevention Lifeline:  ? 1-372.448.5636. This is open 24 hours a day.  This information is not intended to replace advice given to you by your health care provider. Make sure you discuss any questions you have with your health care provider.  Document Released: 11/28/2016 Document Revised: 09/03/2017 Document Reviewed: 09/03/2017  [employer] Interactive Patient Education © 2020 [employer]  Allergic Rhinitis, Adult  Allergic rhinitis is an allergic reaction that affects the mucous membrane inside the nose. It causes sneezing, a runny or stuffy nose, and the feeling of mucus going down the back of the throat (postnasal drip). Allergic rhinitis can be mild to severe.  There are two types of allergic rhinitis:  · Seasonal. This type is also called hay fever. It happens only during certain seasons.  · Perennial. This type can happen at any time of the year.  What are the causes?  This condition happens when the body's defense system (immune system) responds to certain harmless substances called allergens as though they were germs.   Seasonal allergic rhinitis is triggered by pollen, which can come from grasses, trees, and weeds. Perennial allergic rhinitis may be caused by:  · House dust mites.  · Pet dander.  · Mold spores.  What are the signs or symptoms?  Symptoms of this condition include:  · Sneezing.  · Runny or stuffy  nose (nasal congestion).  · Postnasal drip.  · Itchy nose.  · Tearing of the eyes.  · Trouble sleeping.  · Daytime sleepiness.  How is this diagnosed?  This condition may be diagnosed based on:  · Your medical history.  · A physical exam.  · Tests to check for related conditions, such as:  ? Asthma.  ? Pink eye.  ? Ear infection.  ? Upper respiratory infection.  · Tests to find out which allergens trigger your symptoms. These may include skin or blood tests.  How is this treated?  There is no cure for this condition, but treatment can help control symptoms. Treatment may include:  · Taking medicines that block allergy symptoms, such as antihistamines. Medicine may be given as a shot, nasal spray, or pill.  · Avoiding the allergen.  · Desensitization. This treatment involves getting ongoing shots until your body becomes less sensitive to the allergen. This treatment may be done if other treatments do not help.  · If taking medicine and avoiding the allergen does not work, new, stronger medicines may be prescribed.  Follow these instructions at home:  · Find out what you are allergic to. Common allergens include smoke, dust, and pollen.  · Avoid the things you are allergic to. These are some things you can do to help avoid allergens:  ? Replace carpet with wood, tile, or vinyl petr. Carpet can trap dander and dust.  ? Do not smoke. Do not allow smoking in your home.  ? Change your heating and air conditioning filter at least once a month.  ? During allergy season:  § Keep windows closed as much as possible.  § Plan outdoor activities when pollen counts are lowest. This is usually during the evening hours.  § When coming indoors, change clothing and shower before sitting on furniture or bedding.  · Take over-the-counter and prescription medicines only as told by your health care provider.  · Keep all follow-up visits as told by your health care provider. This is important.  Contact a health care provider if:  · You  have a fever.  · You develop a persistent cough.  · You make whistling sounds when you breathe (you wheeze).  · Your symptoms interfere with your normal daily activities.  Get help right away if:  · You have shortness of breath.  Summary  · This condition can be managed by taking medicines as directed and avoiding allergens.  · Contact your health care provider if you develop a persistent cough or fever.  · During allergy season, keep windows closed as much as possible.  This information is not intended to replace advice given to you by your health care provider. Make sure you discuss any questions you have with your health care provider.  Document Released: 09/12/2002 Document Revised: 01/25/2018 Document Reviewed: 01/25/2018  [employer] Interactive Patient Education © 2020 [employer]

## 2020-05-13 ENCOUNTER — TELEPHONE (OUTPATIENT)
Dept: FAMILY MEDICINE CLINIC | Facility: CLINIC | Age: 60
End: 2020-05-13

## 2020-05-13 RX ORDER — DULOXETIN HYDROCHLORIDE 20 MG/1
20 CAPSULE, DELAYED RELEASE ORAL DAILY
Qty: 30 CAPSULE | Refills: 2 | Status: SHIPPED | OUTPATIENT
Start: 2020-05-13 | End: 2020-09-08

## 2020-05-13 NOTE — TELEPHONE ENCOUNTER
has called and requested a refill for Symbalta. I attempted to send this to [facility] but was unable to find it on her med list please advise.

## 2020-05-13 NOTE — TELEPHONE ENCOUNTER
Please advise below.  I see citalopram 20 mg but it has been discontinued on 05/11/2020.  Please advise

## 2020-06-18 DIAGNOSIS — Z79.899 LONG-TERM USE OF HIGH-RISK MEDICATION: ICD-10-CM

## 2020-06-18 DIAGNOSIS — G47.09 OTHER INSOMNIA: ICD-10-CM

## 2020-06-19 DIAGNOSIS — Z79.899 LONG-TERM USE OF HIGH-RISK MEDICATION: ICD-10-CM

## 2020-06-19 DIAGNOSIS — G47.09 OTHER INSOMNIA: ICD-10-CM

## 2020-06-19 RX ORDER — ZOLPIDEM TARTRATE 10 MG/1
10 TABLET ORAL NIGHTLY PRN
Qty: 30 TABLET | Refills: 0 | OUTPATIENT
Start: 2020-06-19

## 2020-06-19 RX ORDER — ZOLPIDEM TARTRATE 10 MG/1
10 TABLET ORAL NIGHTLY PRN
Qty: 30 TABLET | Refills: 1 | OUTPATIENT
Start: 2020-06-19

## 2020-06-19 NOTE — TELEPHONE ENCOUNTER
Patient called in to request a medication refill be called in to the pharmacy.    Best Callback: [phone]    Medicine: zolpidem (AMBIEN) 10 MG tablet    Pharmacy: [facility] [address] [address] - [phone]  - [phone]   [phone]    Patient Notes: n/a

## 2020-06-22 ENCOUNTER — OFFICE VISIT (OUTPATIENT)
Dept: FAMILY MEDICINE CLINIC | Facility: CLINIC | Age: 60
End: 2020-06-22

## 2020-06-22 ENCOUNTER — TELEPHONE (OUTPATIENT)
Dept: FAMILY MEDICINE CLINIC | Facility: CLINIC | Age: 60
End: 2020-06-22

## 2020-06-22 VITALS
SYSTOLIC BLOOD PRESSURE: 108 MMHG | OXYGEN SATURATION: 99 % | HEART RATE: 77 BPM | BODY MASS INDEX: 28.85 KG/M2 | WEIGHT: 169 LBS | DIASTOLIC BLOOD PRESSURE: 68 MMHG | TEMPERATURE: 96.9 F | HEIGHT: 64 IN

## 2020-06-22 DIAGNOSIS — Z79.899 LONG-TERM USE OF HIGH-RISK MEDICATION: ICD-10-CM

## 2020-06-22 DIAGNOSIS — E78.49 OTHER HYPERLIPIDEMIA: ICD-10-CM

## 2020-06-22 DIAGNOSIS — Z79.899 LONG-TERM USE OF HIGH-RISK MEDICATION: Primary | ICD-10-CM

## 2020-06-22 DIAGNOSIS — G47.09 OTHER INSOMNIA: ICD-10-CM

## 2020-06-22 DIAGNOSIS — F32.1 CURRENT MODERATE EPISODE OF MAJOR DEPRESSIVE DISORDER, UNSPECIFIED WHETHER RECURRENT (HCC): ICD-10-CM

## 2020-06-22 DIAGNOSIS — E11.9 TYPE 2 DIABETES MELLITUS WITHOUT COMPLICATION, WITHOUT LONG-TERM CURRENT USE OF INSULIN (HCC): ICD-10-CM

## 2020-06-22 DIAGNOSIS — F41.9 ANXIETY: ICD-10-CM

## 2020-06-22 LAB
ALBUMIN SERPL-MCNC: 4.4 G/DL (ref 3.5–5.2)
ALBUMIN/GLOB SERPL: 1.9 G/DL
ALP SERPL-CCNC: 54 U/L (ref 39–117)
ALT SERPL-CCNC: 17 U/L (ref 1–33)
AST SERPL-CCNC: 10 U/L (ref 1–32)
BASOPHILS # BLD AUTO: 0.04 10*3/MM3 (ref 0–0.2)
BASOPHILS NFR BLD AUTO: 0.9 % (ref 0–1.5)
BILIRUB SERPL-MCNC: 0.4 MG/DL (ref 0.2–1.2)
BUN SERPL-MCNC: 8 MG/DL (ref 8–23)
BUN/CREAT SERPL: 11.1 (ref 7–25)
CALCIUM SERPL-MCNC: 9.5 MG/DL (ref 8.6–10.5)
CHLORIDE SERPL-SCNC: 108 MMOL/L (ref 98–107)
CHOLEST SERPL-MCNC: 213 MG/DL (ref 0–200)
CO2 SERPL-SCNC: 26 MMOL/L (ref 22–29)
CREAT SERPL-MCNC: 0.72 MG/DL (ref 0.57–1)
EOSINOPHIL # BLD AUTO: 0.23 10*3/MM3 (ref 0–0.4)
EOSINOPHIL NFR BLD AUTO: 5.4 % (ref 0.3–6.2)
ERYTHROCYTE [DISTWIDTH] IN BLOOD BY AUTOMATED COUNT: 13.1 % (ref 12.3–15.4)
GLOBULIN SER CALC-MCNC: 2.3 GM/DL
GLUCOSE SERPL-MCNC: 108 MG/DL (ref 65–99)
HBA1C MFR BLD: 5.9 %
HCT VFR BLD AUTO: 39.3 % (ref 34–46.6)
HDLC SERPL-MCNC: 55 MG/DL (ref 40–60)
HGB BLD-MCNC: 14 G/DL (ref 12–15.9)
IMM GRANULOCYTES # BLD AUTO: 0.01 10*3/MM3 (ref 0–0.05)
IMM GRANULOCYTES NFR BLD AUTO: 0.2 % (ref 0–0.5)
LDLC SERPL CALC-MCNC: 133 MG/DL (ref 0–100)
LYMPHOCYTES # BLD AUTO: 1.94 10*3/MM3 (ref 0.7–3.1)
LYMPHOCYTES NFR BLD AUTO: 45.3 % (ref 19.6–45.3)
MCH RBC QN AUTO: 32 PG (ref 26.6–33)
MCHC RBC AUTO-ENTMCNC: 35.6 G/DL (ref 31.5–35.7)
MCV RBC AUTO: 89.7 FL (ref 79–97)
MONOCYTES # BLD AUTO: 0.3 10*3/MM3 (ref 0.1–0.9)
MONOCYTES NFR BLD AUTO: 7 % (ref 5–12)
NEUTROPHILS # BLD AUTO: 1.76 10*3/MM3 (ref 1.7–7)
NEUTROPHILS NFR BLD AUTO: 41.2 % (ref 42.7–76)
NRBC BLD AUTO-RTO: 0.2 /100 WBC (ref 0–0.2)
PLATELET # BLD AUTO: 220 10*3/MM3 (ref 140–450)
POTASSIUM SERPL-SCNC: 3.8 MMOL/L (ref 3.5–5.2)
PROT SERPL-MCNC: 6.7 G/DL (ref 6–8.5)
RBC # BLD AUTO: 4.38 10*6/MM3 (ref 3.77–5.28)
SODIUM SERPL-SCNC: 143 MMOL/L (ref 136–145)
TRIGL SERPL-MCNC: 126 MG/DL (ref 0–150)
TSH SERPL DL<=0.005 MIU/L-ACNC: 0.15 UIU/ML (ref 0.27–4.2)
VLDLC SERPL CALC-MCNC: 25.2 MG/DL
WBC # BLD AUTO: 4.28 10*3/MM3 (ref 3.4–10.8)

## 2020-06-22 PROCEDURE — 99214 OFFICE O/P EST MOD 30 MIN: CPT | Performed by: NURSE PRACTITIONER

## 2020-06-22 PROCEDURE — 83036 HEMOGLOBIN GLYCOSYLATED A1C: CPT | Performed by: NURSE PRACTITIONER

## 2020-06-22 RX ORDER — TOPIRAMATE 50 MG/1
100 TABLET, FILM COATED ORAL
Qty: 60 TABLET | Refills: 2 | Status: SHIPPED | OUTPATIENT
Start: 2020-06-22 | End: 2020-08-31

## 2020-06-22 RX ORDER — ZOLPIDEM TARTRATE 10 MG/1
10 TABLET ORAL NIGHTLY PRN
Qty: 30 TABLET | Refills: 2 | Status: SHIPPED | OUTPATIENT
Start: 2020-06-22 | End: 2020-09-09

## 2020-06-22 RX ORDER — PEN NEEDLE, DIABETIC 30 GX5/16"
1 NEEDLE, DISPOSABLE MISCELLANEOUS AS NEEDED
Qty: 100 EACH | Refills: 0 | Status: SHIPPED | OUTPATIENT
Start: 2020-06-22

## 2020-06-22 NOTE — TELEPHONE ENCOUNTER
----- Message from PATRIZIA Allen sent at 6/22/2020 12:44 PM EDT -----  Please notify patient her a1c was 5.9.   I am okay with trialing trulicity per her request.  She will stop the glipizide.   Sent to pharmacy

## 2020-06-22 NOTE — PROGRESS NOTES
Subjective   Violet Rahman is a 60 y.o. female.     Chief Complaint   Patient presents with   • Med Refill     ambien        History of Present Illness       Patient is here today for follow up on insomnia.      She has been on ambien for a lot time.  Has trialed multiple other things in the past for sleep. We have discussed side effects and issues with medication.  Patient verbalizes understanding   RICARDO-today  UDS-today  CONSENT-10/31/2019    MIGRAINES: feels like the medication is helping, but still getting headaches in sleep at times.  WE started topamax.  Will increase    DEPRESSION: feels like cymbalta is working well    DIABETES: feels like sugars are going up at night.  Would like to trial trulicity for diabetes  A1c is well controlled at 5.9    NEUROPATHY: doesn't like the lyrica. Has been doing okay with leg pain without either one so we will continue without and see how she does    HYPERTENSION: denies any issues with medication    HYPERLIPIDEMIA: denies any issues with medication    ALLERGIES: denies any issues with medications is fasting for labs today    The following portions of the patient's history were reviewed and updated as appropriate: allergies, current medications, past family history, past medical history, past social history, past surgical history and problem list.    Past Medical History:   Diagnosis Date   • Anxiety    • Depression    • Diabetes (CMS/HCC)    • Hypertension    • Hypothyroidism    • OA (osteoarthritis)    • OAB (overactive bladder)    • AMELIA (obstructive sleep apnea)    • Stomach ulcer        Past Surgical History:   Procedure Laterality Date   •  SECTION      X2   • COLONOSCOPY N/A 2016    Procedure: COLONOSCOPY;  Surgeon: Raiza Talbot MD;  Location: [facility] OR;  Service:    • ENDOSCOPY N/A 2016    Procedure: ESOPHAGOGASTRODUODENOSCOPY with biopsies  and link test;  Surgeon: Raiza Talbot MD;  Location: [facility] OR;  Service:    • KNEE SURGERY Right     • LAPAROSCOPIC CHOLECYSTECTOMY         Family History   Problem Relation Age of Onset   • Hypertension Mother    • Stroke Mother    • Diabetes Mother    • Lung cancer Father    • Diabetes Sister    • Colon cancer Sister        Social History     Socioeconomic History   • Marital status: Single     Spouse name: Not on file   • Number of children: Not on file   • Years of education: Not on file   • Highest education level: Not on file   Tobacco Use   • Smoking status: Never Smoker   • Smokeless tobacco: Never Used   Substance and Sexual Activity   • Alcohol use: No   • Drug use: No   • Sexual activity: Defer         Current Outpatient Medications:   •  atenolol (TENORMIN) 25 MG tablet, Take 1 tablet by mouth once daily, Disp: 90 tablet, Rfl: 0  •  BREO ELLIPTA 100-25 MCG/INH inhaler, Inhale 1 puff Daily. Rinse mouth after each use, Disp: , Rfl: 11  •  DULoxetine (Cymbalta) 20 MG capsule, Take 1 capsule by mouth Daily., Disp: 30 capsule, Rfl: 2  •  fluticasone (FLONASE) 50 MCG/ACT nasal spray, 2 sprays into the nostril(s) as directed by provider Daily., Disp: 16 g, Rfl: 11  •  FLUZONE QUADRIVALENT 0.5 ML suspension prefilled syringe injection, PHARMACIST ADMINISTERED IMMUNIZATION ADMINISTERED AT TIME OF DISPENSING, Disp: , Rfl: 0  •  glipizide (GLUCOTROL) 10 MG tablet, Take 1 tablet by mouth twice daily, Disp: 180 tablet, Rfl: 0  •  glucose blood test strip, Use as instructed, Disp: 200 each, Rfl: 2  •  glucose blood test strip, , Disp: , Rfl:   •  HYDROcodone-acetaminophen (NORCO) 7.5-325 MG per tablet, , Disp: , Rfl:   •  levothyroxine (SYNTHROID, LEVOTHROID) 125 MCG tablet, Take 1 tablet by mouth once daily, Disp: 90 tablet, Rfl: 0  •  linaclotide (LINZESS) 145 MCG capsule capsule, Take 1 capsule by mouth Every Morning Before Breakfast., Disp: 30 capsule, Rfl: 5  •  metFORMIN (GLUCOPHAGE) 1000 MG tablet, Take 1 tablet by mouth 2 (Two) Times a Day With Meals., Disp: 180 tablet, Rfl: 0  •  Mirabegron ER (MYRBETRIQ)  50 MG tablet sustained-release 24 hour 24 hr tablet, Take 50 mg by mouth Daily., Disp: 14 tablet, Rfl: 0  •  Multiple Vitamins-Minerals (MULTIVITAMIN WITH MINERALS) tablet, Take 1 tablet by mouth Daily., Disp: 30 tablet, Rfl: 11  •  ondansetron ODT (ZOFRAN-ODT) 4 MG disintegrating tablet, Take 1 tablet by mouth Every 8 (Eight) Hours As Needed for Nausea or Vomiting., Disp: 12 tablet, Rfl: 2  •  PROAIR  (90 Base) MCG/ACT inhaler, Inhale See Admin Instructions. Inhale 2 puffs by mouth every 4 to 6 hours as needed, Disp: , Rfl: 3  •  zolpidem (AMBIEN) 10 MG tablet, Take 1 tablet by mouth At Night As Needed for Sleep., Disp: 30 tablet, Rfl: 1  •  amoxicillin (AMOXIL) 875 MG tablet, Take 875 mg by mouth Every 12 (Twelve) Hours., Disp: , Rfl:   •  cetirizine (zyrTEC) 10 MG tablet, Take 10 mg by mouth Daily. as directed, Disp: , Rfl:   •  rizatriptan MLT (MAXALT-MLT) 10 MG disintegrating tablet, Place 1 tablet on the tongue 1 (One) Time As Needed for Migraine for up to 1 dose. May repeat in 2 hours if needed, Disp: 9 tablet, Rfl: 5  •  rosuvastatin (CRESTOR) 10 MG tablet, Take 1 tablet by mouth Daily., Disp: 30 tablet, Rfl: 2  •  topiramate (TOPAMAX) 50 MG tablet, Take 1 tablet by mouth every night at bedtime., Disp: 30 tablet, Rfl: 5  •  vitamin C (ASCORBIC ACID) 250 MG tablet, Take 1 tablet by mouth Daily., Disp: 30 tablet, Rfl: 11    Review of Systems   Constitutional: Negative for fatigue and fever.   Respiratory: Negative for cough, shortness of breath and wheezing.    Cardiovascular: Negative for chest pain.   Gastrointestinal: Negative for abdominal pain, constipation, diarrhea, nausea and vomiting.   Genitourinary: Negative for dysuria and urgency.   Neurological: Positive for headache. Negative for dizziness.   Psychiatric/Behavioral: Negative for suicidal ideas and depressed mood. The patient is not nervous/anxious.        Objective   Vitals:    06/22/20 0903   BP: 108/68   Pulse: 77   Temp: 96.9 °F (36.1  "°C)   SpO2: 99%   Weight: 76.7 kg (169 lb)   Height: 162.6 cm (64\")     Body mass index is 29.01 kg/m².  Physical Exam   Constitutional: She is oriented to person, place, and time. She appears well-developed and well-nourished.   Cardiovascular: Normal rate, regular rhythm and normal heart sounds.   Pulmonary/Chest: Effort normal and breath sounds normal.   Neurological: She is alert and oriented to person, place, and time.   Psychiatric: She has a normal mood and affect. Her behavior is normal. Judgment and thought content normal.         Assessment/Plan   Violet was seen today for med refill.    Diagnoses and all orders for this visit:    Long-term use of high-risk medication  -     Drug Profile [id number] - Urine, Clean Catch    Other insomnia  -     Drug Profile [id number] - Urine, Clean Catch    Anxiety    Current moderate episode of major depressive disorder, unspecified whether recurrent (CMS/[facility])    Type 2 diabetes mellitus without complication, without long-term current use of insulin (CMS/[facility])  -     CBC & Differential  -     Comprehensive Metabolic Panel  -     Lipid Panel  -     TSH  -     POC Glycosylated Hemoglobin (Hb A1C)    Other hyperlipidemia  -     Lipid Panel      INSOMNIA: will continue ambien.  Discussed risk of her being on this and her pain medications.  Patient verbalizes understanding.      MIGRAINES: feels like the medication is helping, but still getting headaches in sleep at times.  WE started topamax.  Will increase    DEPRESSION: feels like cymbalta is working well. Continue    DIABETES:   A1c is well controlled at 5.9.  I am okay with a trial of trulicity at her request, but will stop glipizide, which is a much more risky medication anyways with increase risk of hypoglycemia.       NEUROPATHY: doesn't like the lyrica. Has been doing okay with leg pain without either one so we will continue without and see how she does    HYPERTENSION: denies any issues with medication    HYPERLIPIDEMIA: " denies any issues with medication    ALLERGIES: denies any issues with medications is fasting for labs today    Will call with results of labs and any changes needed to plan of care.  Follow up in 3 months.         Patient Instructions   Insomnia  Insomnia is a sleep disorder that makes it difficult to fall asleep or stay asleep. Insomnia can cause fatigue, low energy, difficulty concentrating, mood swings, and poor performance at work or school.  There are three different ways to classify insomnia:  · Difficulty falling asleep.  · Difficulty staying asleep.  · Waking up too early in the morning.  Any type of insomnia can be long-term (chronic) or short-term (acute). Both are common. Short-term insomnia usually lasts for three months or less. Chronic insomnia occurs at least three times a week for longer than three months.  What are the causes?  Insomnia may be caused by another condition, situation, or substance, such as:  · Anxiety.  · Certain medicines.  · Gastroesophageal reflux disease (GERD) or other gastrointestinal conditions.  · Asthma or other breathing conditions.  · Restless legs syndrome, sleep apnea, or other sleep disorders.  · Chronic pain.  · Menopause.  · Stroke.  · Abuse of alcohol, tobacco, or illegal drugs.  · Mental health conditions, such as depression.  · Caffeine.  · Neurological disorders, such as Alzheimer's disease.  · An overactive thyroid (hyperthyroidism).  Sometimes, the cause of insomnia may not be known.  What increases the risk?  Risk factors for insomnia include:  · Gender. Women are affected more often than men.  · Age. Insomnia is more common as you get older.  · Stress.  · Lack of exercise.  · Irregular work schedule or working night shifts.  · Traveling between different time zones.  · Certain medical and mental health conditions.  What are the signs or symptoms?  If you have insomnia, the main symptom is having trouble falling asleep or having trouble staying asleep. This may  lead to other symptoms, such as:  · Feeling fatigued or having low energy.  · Feeling nervous about going to sleep.  · Not feeling rested in the morning.  · Having trouble concentrating.  · Feeling irritable, anxious, or depressed.  How is this diagnosed?  This condition may be diagnosed based on:  · Your symptoms and medical history. Your health care provider may ask about:  ? Your sleep habits.  ? Any medical conditions you have.  ? Your mental health.  · A physical exam.  How is this treated?  Treatment for insomnia depends on the cause. Treatment may focus on treating an underlying condition that is causing insomnia. Treatment may also include:  · Medicines to help you sleep.  · Counseling or therapy.  · Lifestyle adjustments to help you sleep better.  Follow these instructions at home:  Eating and drinking    · Limit or avoid alcohol, caffeinated beverages, and cigarettes, especially close to bedtime. These can disrupt your sleep.  · Do not eat a large meal or eat spicy foods right before bedtime. This can lead to digestive discomfort that can make it hard for you to sleep.  Sleep habits    · Keep a sleep diary to help you and your health care provider figure out what could be causing your insomnia. Write down:  ? When you sleep.  ? When you wake up during the night.  ? How well you sleep.  ? How rested you feel the next day.  ? Any side effects of medicines you are taking.  ? What you eat and drink.  · Make your bedroom a dark, comfortable place where it is easy to fall asleep.  ? Put up shades or blackout curtains to block light from outside.  ? Use a white noise machine to block noise.  ? Keep the temperature cool.  · Limit screen use before bedtime. This includes:  ? Watching TV.  ? Using your smartphone, tablet, or computer.  · Stick to a routine that includes going to bed and waking up at the same times every day and night. This can help you fall asleep faster. Consider making a quiet activity, such as  reading, part of your nighttime routine.  · Try to avoid taking naps during the day so that you sleep better at night.  · Get out of bed if you are still awake after 15 minutes of trying to sleep. Keep the lights down, but try reading or doing a quiet activity. When you feel sleepy, go back to bed.  General instructions  · Take over-the-counter and prescription medicines only as told by your health care provider.  · Exercise regularly, as told by your health care provider. Avoid exercise starting several hours before bedtime.  · Use relaxation techniques to manage stress. Ask your health care provider to suggest some techniques that may work well for you. These may include:  ? Breathing exercises.  ? Routines to release muscle tension.  ? Visualizing peaceful scenes.  · Make sure that you drive carefully. Avoid driving if you feel very sleepy.  · Keep all follow-up visits as told by your health care provider. This is important.  Contact a health care provider if:  · You are tired throughout the day.  · You have trouble in your daily routine due to sleepiness.  · You continue to have sleep problems, or your sleep problems get worse.  Get help right away if:  · You have serious thoughts about hurting yourself or someone else.  If you ever feel like you may hurt yourself or others, or have thoughts about taking your own life, get help right away. You can go to your nearest emergency department or call:  · Your local emergency services (911 in the [place]  · A suicide crisis helpline, such as the National Suicide Prevention Lifeline at 1-301.734.4479. This is open 24 hours a day.  Summary  · Insomnia is a sleep disorder that makes it difficult to fall asleep or stay asleep.  · Insomnia can be long-term (chronic) or short-term (acute).  · Treatment for insomnia depends on the cause. Treatment may focus on treating an underlying condition that is causing insomnia.  · Keep a sleep diary to help you and your health care  provider figure out what could be causing your insomnia.  This information is not intended to replace advice given to you by your health care provider. Make sure you discuss any questions you have with your health care provider.  Document Released: 12/15/2001 Document Revised: 11/30/2018 Document Reviewed: 09/27/2018  [employer] Patient Education © 2020 [employer]  Diabetes Mellitus and Nutrition, Adult  When you have diabetes (diabetes mellitus), it is very important to have healthy eating habits because your blood sugar (glucose) levels are greatly affected by what you eat and drink. Eating healthy foods in the appropriate amounts, at about the same times every day, can help you:  · Control your blood glucose.  · Lower your risk of heart disease.  · Improve your blood pressure.  · Reach or maintain a healthy weight.  Every person with diabetes is different, and each person has different needs for a meal plan. Your health care provider may recommend that you work with a diet and nutrition specialist (dietitian) to make a meal plan that is best for you. Your meal plan may vary depending on factors such as:  · The calories you need.  · The medicines you take.  · Your weight.  · Your blood glucose, blood pressure, and cholesterol levels.  · Your activity level.  · Other health conditions you have, such as heart or kidney disease.  How do carbohydrates affect me?  Carbohydrates, also called carbs, affect your blood glucose level more than any other type of food. Eating carbs naturally raises the amount of glucose in your blood. Carb counting is a method for keeping track of how many carbs you eat. Counting carbs is important to keep your blood glucose at a healthy level, especially if you use insulin or take certain oral diabetes medicines.  It is important to know how many carbs you can safely have in each meal. This is different for every person. Your dietitian can help you calculate how many carbs you should have  "at each meal and for each snack.  Foods that contain carbs include:  · Bread, cereal, rice, pasta, and crackers.  · Potatoes and corn.  · Peas, beans, and lentils.  · Milk and yogurt.  · Fruit and juice.  · Desserts, such as cakes, cookies, ice cream, and candy.  How does alcohol affect me?  Alcohol can cause a sudden decrease in blood glucose (hypoglycemia), especially if you use insulin or take certain oral diabetes medicines. Hypoglycemia can be a life-threatening condition. Symptoms of hypoglycemia (sleepiness, dizziness, and confusion) are similar to symptoms of having too much alcohol.  If your health care provider says that alcohol is safe for you, follow these guidelines:  · Limit alcohol intake to no more than 1 drink per day for nonpregnant women and 2 drinks per day for men. One drink equals 12 oz of beer, 5 oz of wine, or 1½ oz of hard liquor.  · Do not drink on an empty stomach.  · Keep yourself hydrated with water, diet soda, or unsweetened iced tea.  · Keep in mind that regular soda, juice, and other mixers may contain a lot of sugar and must be counted as carbs.  What are tips for following this plan?    Reading food labels  · Start by checking the serving size on the \"Nutrition Facts\" label of packaged foods and drinks. The amount of calories, carbs, fats, and other nutrients listed on the label is based on one serving of the item. Many items contain more than one serving per package.  · Check the total grams (g) of carbs in one serving. You can calculate the number of servings of carbs in one serving by dividing the total carbs by 15. For example, if a food has 30 g of total carbs, it would be equal to 2 servings of carbs.  · Check the number of grams (g) of saturated and trans fats in one serving. Choose foods that have low or no amount of these fats.  · Check the number of milligrams (mg) of salt (sodium) in one serving. Most people should limit total sodium intake to less than 2,300 mg per " "day.  · Always check the nutrition information of foods labeled as \"low-fat\" or \"nonfat\". These foods may be higher in added sugar or refined carbs and should be avoided.  · Talk to your dietitian to identify your daily goals for nutrients listed on the label.  Shopping  · Avoid buying canned, premade, or processed foods. These foods tend to be high in fat, sodium, and added sugar.  · Shop around the outside edge of the grocery store. This includes fresh fruits and vegetables, bulk grains, fresh meats, and fresh dairy.  Cooking  · Use low-heat cooking methods, such as baking, instead of high-heat cooking methods like deep frying.  · Cook using healthy oils, such as olive, canola, or sunflower oil.  · Avoid cooking with butter, cream, or high-fat meats.  Meal planning  · Eat meals and snacks regularly, preferably at the same times every day. Avoid going long periods of time without eating.  · Eat foods high in fiber, such as fresh fruits, vegetables, beans, and whole grains. Talk to your dietitian about how many servings of carbs you can eat at each meal.  · Eat 4-6 ounces (oz) of lean protein each day, such as lean meat, chicken, fish, eggs, or tofu. One oz of lean protein is equal to:  ? 1 oz of meat, chicken, or fish.  ? 1 egg.  ? ¼ cup of tofu.  · Eat some foods each day that contain healthy fats, such as avocado, nuts, seeds, and fish.  Lifestyle  · Check your blood glucose regularly.  · Exercise regularly as told by your health care provider. This may include:  ? 150 minutes of moderate-intensity or vigorous-intensity exercise each week. This could be brisk walking, biking, or water aerobics.  ? Stretching and doing strength exercises, such as yoga or weightlifting, at least 2 times a week.  · Take medicines as told by your health care provider.  · Do not use any products that contain nicotine or tobacco, such as cigarettes and e-cigarettes. If you need help quitting, ask your health care provider.  · Work with " a counselor or diabetes educator to identify strategies to manage stress and any emotional and social challenges.  Questions to ask a health care provider  · Do I need to meet with a diabetes educator?  · Do I need to meet with a dietitian?  · What number can I call if I have questions?  · When are the best times to check my blood glucose?  Where to find more information:  · American Diabetes Association: diabetes.org  · Academy of Nutrition and Dietetics: www.eatright.org  · National [place] of Diabetes and Digestive and Kidney Diseases (NIH): www.niddk.nih.gov  Summary  · A healthy meal plan will help you control your blood glucose and maintain a healthy lifestyle.  · Working with a diet and nutrition specialist (dietitian) can help you make a meal plan that is best for you.  · Keep in mind that carbohydrates (carbs) and alcohol have immediate effects on your blood glucose levels. It is important to count carbs and to use alcohol carefully.  This information is not intended to replace advice given to you by your health care provider. Make sure you discuss any questions you have with your health care provider.  Document Released: 09/14/2006 Document Revised: 11/30/2018 Document Reviewed: 01/22/2018  [employer] Patient Education © 2020 [employer]

## 2020-06-22 NOTE — PATIENT INSTRUCTIONS
Insomnia  Insomnia is a sleep disorder that makes it difficult to fall asleep or stay asleep. Insomnia can cause fatigue, low energy, difficulty concentrating, mood swings, and poor performance at work or school.  There are three different ways to classify insomnia:  · Difficulty falling asleep.  · Difficulty staying asleep.  · Waking up too early in the morning.  Any type of insomnia can be long-term (chronic) or short-term (acute). Both are common. Short-term insomnia usually lasts for three months or less. Chronic insomnia occurs at least three times a week for longer than three months.  What are the causes?  Insomnia may be caused by another condition, situation, or substance, such as:  · Anxiety.  · Certain medicines.  · Gastroesophageal reflux disease (GERD) or other gastrointestinal conditions.  · Asthma or other breathing conditions.  · Restless legs syndrome, sleep apnea, or other sleep disorders.  · Chronic pain.  · Menopause.  · Stroke.  · Abuse of alcohol, tobacco, or illegal drugs.  · Mental health conditions, such as depression.  · Caffeine.  · Neurological disorders, such as Alzheimer's disease.  · An overactive thyroid (hyperthyroidism).  Sometimes, the cause of insomnia may not be known.  What increases the risk?  Risk factors for insomnia include:  · Gender. Women are affected more often than men.  · Age. Insomnia is more common as you get older.  · Stress.  · Lack of exercise.  · Irregular work schedule or working night shifts.  · Traveling between different time zones.  · Certain medical and mental health conditions.  What are the signs or symptoms?  If you have insomnia, the main symptom is having trouble falling asleep or having trouble staying asleep. This may lead to other symptoms, such as:  · Feeling fatigued or having low energy.  · Feeling nervous about going to sleep.  · Not feeling rested in the morning.  · Having trouble concentrating.  · Feeling irritable, anxious, or depressed.  How  is this diagnosed?  This condition may be diagnosed based on:  · Your symptoms and medical history. Your health care provider may ask about:  ? Your sleep habits.  ? Any medical conditions you have.  ? Your mental health.  · A physical exam.  How is this treated?  Treatment for insomnia depends on the cause. Treatment may focus on treating an underlying condition that is causing insomnia. Treatment may also include:  · Medicines to help you sleep.  · Counseling or therapy.  · Lifestyle adjustments to help you sleep better.  Follow these instructions at home:  Eating and drinking    · Limit or avoid alcohol, caffeinated beverages, and cigarettes, especially close to bedtime. These can disrupt your sleep.  · Do not eat a large meal or eat spicy foods right before bedtime. This can lead to digestive discomfort that can make it hard for you to sleep.  Sleep habits    · Keep a sleep diary to help you and your health care provider figure out what could be causing your insomnia. Write down:  ? When you sleep.  ? When you wake up during the night.  ? How well you sleep.  ? How rested you feel the next day.  ? Any side effects of medicines you are taking.  ? What you eat and drink.  · Make your bedroom a dark, comfortable place where it is easy to fall asleep.  ? Put up shades or blackout curtains to block light from outside.  ? Use a white noise machine to block noise.  ? Keep the temperature cool.  · Limit screen use before bedtime. This includes:  ? Watching TV.  ? Using your smartphone, tablet, or computer.  · Stick to a routine that includes going to bed and waking up at the same times every day and night. This can help you fall asleep faster. Consider making a quiet activity, such as reading, part of your nighttime routine.  · Try to avoid taking naps during the day so that you sleep better at night.  · Get out of bed if you are still awake after 15 minutes of trying to sleep. Keep the lights down, but try reading or  doing a quiet activity. When you feel sleepy, go back to bed.  General instructions  · Take over-the-counter and prescription medicines only as told by your health care provider.  · Exercise regularly, as told by your health care provider. Avoid exercise starting several hours before bedtime.  · Use relaxation techniques to manage stress. Ask your health care provider to suggest some techniques that may work well for you. These may include:  ? Breathing exercises.  ? Routines to release muscle tension.  ? Visualizing peaceful scenes.  · Make sure that you drive carefully. Avoid driving if you feel very sleepy.  · Keep all follow-up visits as told by your health care provider. This is important.  Contact a health care provider if:  · You are tired throughout the day.  · You have trouble in your daily routine due to sleepiness.  · You continue to have sleep problems, or your sleep problems get worse.  Get help right away if:  · You have serious thoughts about hurting yourself or someone else.  If you ever feel like you may hurt yourself or others, or have thoughts about taking your own life, get help right away. You can go to your nearest emergency department or call:  · Your local emergency services (911 in the [place]  · A suicide crisis helpline, such as the National Suicide Prevention Lifeline at 1-187.242.6768. This is open 24 hours a day.  Summary  · Insomnia is a sleep disorder that makes it difficult to fall asleep or stay asleep.  · Insomnia can be long-term (chronic) or short-term (acute).  · Treatment for insomnia depends on the cause. Treatment may focus on treating an underlying condition that is causing insomnia.  · Keep a sleep diary to help you and your health care provider figure out what could be causing your insomnia.  This information is not intended to replace advice given to you by your health care provider. Make sure you discuss any questions you have with your health care provider.  Document  Released: 12/15/2001 Document Revised: 11/30/2018 Document Reviewed: 09/27/2018  [employer] Patient Education © 2020 [employer]  Diabetes Mellitus and Nutrition, Adult  When you have diabetes (diabetes mellitus), it is very important to have healthy eating habits because your blood sugar (glucose) levels are greatly affected by what you eat and drink. Eating healthy foods in the appropriate amounts, at about the same times every day, can help you:  · Control your blood glucose.  · Lower your risk of heart disease.  · Improve your blood pressure.  · Reach or maintain a healthy weight.  Every person with diabetes is different, and each person has different needs for a meal plan. Your health care provider may recommend that you work with a diet and nutrition specialist (dietitian) to make a meal plan that is best for you. Your meal plan may vary depending on factors such as:  · The calories you need.  · The medicines you take.  · Your weight.  · Your blood glucose, blood pressure, and cholesterol levels.  · Your activity level.  · Other health conditions you have, such as heart or kidney disease.  How do carbohydrates affect me?  Carbohydrates, also called carbs, affect your blood glucose level more than any other type of food. Eating carbs naturally raises the amount of glucose in your blood. Carb counting is a method for keeping track of how many carbs you eat. Counting carbs is important to keep your blood glucose at a healthy level, especially if you use insulin or take certain oral diabetes medicines.  It is important to know how many carbs you can safely have in each meal. This is different for every person. Your dietitian can help you calculate how many carbs you should have at each meal and for each snack.  Foods that contain carbs include:  · Bread, cereal, rice, pasta, and crackers.  · Potatoes and corn.  · Peas, beans, and lentils.  · Milk and yogurt.  · Fruit and juice.  · Desserts, such as cakes, cookies,  "ice cream, and candy.  How does alcohol affect me?  Alcohol can cause a sudden decrease in blood glucose (hypoglycemia), especially if you use insulin or take certain oral diabetes medicines. Hypoglycemia can be a life-threatening condition. Symptoms of hypoglycemia (sleepiness, dizziness, and confusion) are similar to symptoms of having too much alcohol.  If your health care provider says that alcohol is safe for you, follow these guidelines:  · Limit alcohol intake to no more than 1 drink per day for nonpregnant women and 2 drinks per day for men. One drink equals 12 oz of beer, 5 oz of wine, or 1½ oz of hard liquor.  · Do not drink on an empty stomach.  · Keep yourself hydrated with water, diet soda, or unsweetened iced tea.  · Keep in mind that regular soda, juice, and other mixers may contain a lot of sugar and must be counted as carbs.  What are tips for following this plan?    Reading food labels  · Start by checking the serving size on the \"Nutrition Facts\" label of packaged foods and drinks. The amount of calories, carbs, fats, and other nutrients listed on the label is based on one serving of the item. Many items contain more than one serving per package.  · Check the total grams (g) of carbs in one serving. You can calculate the number of servings of carbs in one serving by dividing the total carbs by 15. For example, if a food has 30 g of total carbs, it would be equal to 2 servings of carbs.  · Check the number of grams (g) of saturated and trans fats in one serving. Choose foods that have low or no amount of these fats.  · Check the number of milligrams (mg) of salt (sodium) in one serving. Most people should limit total sodium intake to less than 2,300 mg per day.  · Always check the nutrition information of foods labeled as \"low-fat\" or \"nonfat\". These foods may be higher in added sugar or refined carbs and should be avoided.  · Talk to your dietitian to identify your daily goals for nutrients listed " on the label.  Shopping  · Avoid buying canned, premade, or processed foods. These foods tend to be high in fat, sodium, and added sugar.  · Shop around the outside edge of the grocery store. This includes fresh fruits and vegetables, bulk grains, fresh meats, and fresh dairy.  Cooking  · Use low-heat cooking methods, such as baking, instead of high-heat cooking methods like deep frying.  · Cook using healthy oils, such as olive, canola, or sunflower oil.  · Avoid cooking with butter, cream, or high-fat meats.  Meal planning  · Eat meals and snacks regularly, preferably at the same times every day. Avoid going long periods of time without eating.  · Eat foods high in fiber, such as fresh fruits, vegetables, beans, and whole grains. Talk to your dietitian about how many servings of carbs you can eat at each meal.  · Eat 4-6 ounces (oz) of lean protein each day, such as lean meat, chicken, fish, eggs, or tofu. One oz of lean protein is equal to:  ? 1 oz of meat, chicken, or fish.  ? 1 egg.  ? ¼ cup of tofu.  · Eat some foods each day that contain healthy fats, such as avocado, nuts, seeds, and fish.  Lifestyle  · Check your blood glucose regularly.  · Exercise regularly as told by your health care provider. This may include:  ? 150 minutes of moderate-intensity or vigorous-intensity exercise each week. This could be brisk walking, biking, or water aerobics.  ? Stretching and doing strength exercises, such as yoga or weightlifting, at least 2 times a week.  · Take medicines as told by your health care provider.  · Do not use any products that contain nicotine or tobacco, such as cigarettes and e-cigarettes. If you need help quitting, ask your health care provider.  · Work with a counselor or diabetes educator to identify strategies to manage stress and any emotional and social challenges.  Questions to ask a health care provider  · Do I need to meet with a diabetes educator?  · Do I need to meet with a  dietitian?  · What number can I call if I have questions?  · When are the best times to check my blood glucose?  Where to find more information:  · American Diabetes Association: diabetes.org  · Academy of Nutrition and Dietetics: www.eatright.org  · National [place] of Diabetes and Digestive and Kidney Diseases (NIH): www.niddk.nih.gov  Summary  · A healthy meal plan will help you control your blood glucose and maintain a healthy lifestyle.  · Working with a diet and nutrition specialist (dietitian) can help you make a meal plan that is best for you.  · Keep in mind that carbohydrates (carbs) and alcohol have immediate effects on your blood glucose levels. It is important to count carbs and to use alcohol carefully.  This information is not intended to replace advice given to you by your health care provider. Make sure you discuss any questions you have with your health care provider.  Document Released: 09/14/2006 Document Revised: 11/30/2018 Document Reviewed: 01/22/2018  [employer] Patient Education © 2020 [employer]

## 2020-06-23 ENCOUNTER — TELEPHONE (OUTPATIENT)
Dept: FAMILY MEDICINE CLINIC | Facility: CLINIC | Age: 60
End: 2020-06-23

## 2020-06-23 NOTE — TELEPHONE ENCOUNTER
Called pharmacy and they stated she picked up her trulicity at 11 am this morning.  Called pt and she stated that she is confused because you didn't call her in insulin but you called her in pen needles.  Pt stated that she has stopped taking her glipizide and her blood sugar was over 400 this morning.   I informed her that you were not going to call her in insulin.  The trulicity is like insulin but it helps her body make her own insulin.  I informed her to take her weekly trulicity injections as directed.

## 2020-06-23 NOTE — TELEPHONE ENCOUNTER
PATIENT CALLED AND SHE IS SUPPOSED TO START TAKING TRULICITY. SHE RECEIVED THE SUPPLIES FROM THE PHARMACY [facility] NOT THE MEDICATION.     PATIENT HAS STOPPED TAKING THE GLIPIZIDE    PHARMACY CONFIRMED   [facility] - [place], [place] - [address] [place] [address] - [phone]  - [phone] FX

## 2020-06-24 LAB
AMPHETAMINES UR QL SCN: NEGATIVE NG/ML
BARBITURATES UR QL: NEGATIVE NG/ML
BENZODIAZ UR QL SCN: NEGATIVE NG/ML
BZE UR QL: NEGATIVE NG/ML
CANNABINOIDS UR QL SCN: NEGATIVE NG/ML
CREAT UR-MCNC: 36.7 MG/DL (ref 20–300)
ETHANOL UR-MCNC: NEGATIVE %
MEPERIDINE UR QL: NEGATIVE NG/ML
METHADONE UR QL: NEGATIVE NG/ML
OPIATES UR QL SCN: NEGATIVE NG/ML
OXYCODONE+OXYMORPHONE UR QL SCN: NEGATIVE NG/ML
PCP UR QL: NEGATIVE NG/ML
PROPOXYPH UR QL: NEGATIVE NG/ML
TRAMADOL UR QL SCN: NEGATIVE NG/ML

## 2020-06-24 RX ORDER — LEVOTHYROXINE SODIUM 0.1 MG/1
100 TABLET ORAL DAILY
Qty: 30 TABLET | Refills: 2 | Status: SHIPPED | OUTPATIENT
Start: 2020-06-24 | End: 2020-11-05

## 2020-06-26 RX ORDER — ATENOLOL 25 MG/1
TABLET ORAL
Qty: 90 TABLET | Refills: 0 | Status: SHIPPED | OUTPATIENT
Start: 2020-06-26 | End: 2020-10-26

## 2020-07-20 ENCOUNTER — OFFICE VISIT (OUTPATIENT)
Dept: FAMILY MEDICINE CLINIC | Facility: CLINIC | Age: 60
End: 2020-07-20

## 2020-07-20 VITALS
TEMPERATURE: 97.1 F | HEART RATE: 98 BPM | DIASTOLIC BLOOD PRESSURE: 62 MMHG | SYSTOLIC BLOOD PRESSURE: 120 MMHG | BODY MASS INDEX: 28 KG/M2 | OXYGEN SATURATION: 99 % | HEIGHT: 64 IN | WEIGHT: 164 LBS

## 2020-07-20 DIAGNOSIS — S93.402A SPRAIN OF LEFT ANKLE, UNSPECIFIED LIGAMENT, INITIAL ENCOUNTER: Primary | ICD-10-CM

## 2020-07-20 PROCEDURE — 99213 OFFICE O/P EST LOW 20 MIN: CPT | Performed by: FAMILY MEDICINE

## 2020-07-20 NOTE — PROGRESS NOTES
Subjective   Violet Rahman is a 60 y.o. female.     Chief Complaint   Patient presents with   • Ankle Pain     left ankle pain       Patient is here today with a new problem.  She fell over while seated in a chair and twisted her left ankle in the process 4 days ago.  Since then she has been having pain in the lateral aspect of her left ankle.  There is also some slight swelling there.  It is not bruised or red.  She is able to bear weight but it does increase the pain with flexion and extension of the ankle.  She denies any numbness or tingling distal to the ankle.       Review of Systems   Constitutional: Negative for activity change, chills, fatigue and fever.   HENT: Negative for hearing loss, swollen glands, tinnitus and trouble swallowing.    Eyes: Negative for pain and visual disturbance.   Respiratory: Negative for cough and shortness of breath.    Cardiovascular: Negative for chest pain, palpitations and leg swelling.   Gastrointestinal: Negative for diarrhea and nausea.   Endocrine: Negative for polydipsia and polyuria.   Genitourinary: Negative for difficulty urinating and urinary incontinence.   Musculoskeletal: Positive for arthralgias (Left ankle). Negative for gait problem and joint swelling.   Skin: Negative for rash.   Allergic/Immunologic: Negative for immunocompromised state.   Neurological: Negative for dizziness, light-headedness and headache.   Hematological: Negative for adenopathy. Does not bruise/bleed easily.   Psychiatric/Behavioral: Negative for dysphoric mood and sleep disturbance.       The following portions of the patient's history were reviewed and updated as appropriate: allergies, current medications, past family history, past medical history, past social history, past surgical history and problem list.    Past Medical History:   Diagnosis Date   • Anxiety    • Depression    • Diabetes (CMS/HCC)    • Hypertension    • Hypothyroidism    • OA (osteoarthritis)    • OAB (overactive  bladder)    • AMELIA (obstructive sleep apnea)    • Stomach ulcer        Past Surgical History:   Procedure Laterality Date   •  SECTION      X2   • COLONOSCOPY N/A 2016    Procedure: COLONOSCOPY;  Surgeon: Raiza Talbot MD;  Location: [facility] OR;  Service:    • ENDOSCOPY N/A 2016    Procedure: ESOPHAGOGASTRODUODENOSCOPY with biopsies  and link test;  Surgeon: Raiza Talbot MD;  Location: [facility] OR;  Service:    • KNEE SURGERY Right    • LAPAROSCOPIC CHOLECYSTECTOMY         Family History   Problem Relation Age of Onset   • Hypertension Mother    • Stroke Mother    • Diabetes Mother    • Lung cancer Father    • Diabetes Sister    • Colon cancer Sister        Social History     Socioeconomic History   • Marital status: Single     Spouse name: Not on file   • Number of children: Not on file   • Years of education: Not on file   • Highest education level: Not on file   Tobacco Use   • Smoking status: Never Smoker   • Smokeless tobacco: Never Used   Substance and Sexual Activity   • Alcohol use: No   • Drug use: No   • Sexual activity: Defer         Current Outpatient Medications:   •  atenolol (TENORMIN) 25 MG tablet, Take 1 tablet by mouth once daily, Disp: 90 tablet, Rfl: 0  •  BREO ELLIPTA 100-25 MCG/INH inhaler, Inhale 1 puff Daily. Rinse mouth after each use, Disp: , Rfl: 11  •  cetirizine (zyrTEC) 10 MG tablet, Take 10 mg by mouth Daily. as directed, Disp: , Rfl:   •  Dulaglutide 0.75 MG/0.5ML solution pen-injector, Inject 0.75 mg under the skin into the appropriate area as directed 1 (One) Time Per Week., Disp: 4 pen, Rfl: 2  •  DULoxetine (Cymbalta) 20 MG capsule, Take 1 capsule by mouth Daily., Disp: 30 capsule, Rfl: 2  •  fluticasone (FLONASE) 50 MCG/ACT nasal spray, 2 sprays into the nostril(s) as directed by provider Daily., Disp: 16 g, Rfl: 11  •  glucose blood test strip, , Disp: , Rfl:   •  HYDROcodone-acetaminophen (NORCO) 7.5-325 MG per tablet, , Disp: , Rfl:   •  Insulin Pen  "Needle (PEN NEEDLES 5/16\") 30G X 8 MM misc, 1 Units As Needed (with trulicity)., Disp: 100 each, Rfl: 0  •  levothyroxine (SYNTHROID, LEVOTHROID) 100 MCG tablet, Take 1 tablet by mouth Daily., Disp: 30 tablet, Rfl: 2  •  linaclotide (LINZESS) 145 MCG capsule capsule, Take 1 capsule by mouth Every Morning Before Breakfast., Disp: 30 capsule, Rfl: 5  •  metFORMIN (GLUCOPHAGE) 1000 MG tablet, Take 1 tablet by mouth 2 (Two) Times a Day With Meals., Disp: 180 tablet, Rfl: 0  •  Mirabegron ER (MYRBETRIQ) 50 MG tablet sustained-release 24 hour 24 hr tablet, Take 50 mg by mouth Daily., Disp: 14 tablet, Rfl: 0  •  Multiple Vitamins-Minerals (MULTIVITAMIN WITH MINERALS) tablet, Take 1 tablet by mouth Daily., Disp: 30 tablet, Rfl: 11  •  ondansetron ODT (ZOFRAN-ODT) 4 MG disintegrating tablet, Take 1 tablet by mouth Every 8 (Eight) Hours As Needed for Nausea or Vomiting., Disp: 12 tablet, Rfl: 2  •  PROAIR  (90 Base) MCG/ACT inhaler, Inhale See Admin Instructions. Inhale 2 puffs by mouth every 4 to 6 hours as needed, Disp: , Rfl: 3  •  rizatriptan MLT (MAXALT-MLT) 10 MG disintegrating tablet, Place 1 tablet on the tongue 1 (One) Time As Needed for Migraine for up to 1 dose. May repeat in 2 hours if needed, Disp: 9 tablet, Rfl: 5  •  rosuvastatin (CRESTOR) 10 MG tablet, Take 1 tablet by mouth Daily., Disp: 30 tablet, Rfl: 2  •  topiramate (TOPAMAX) 50 MG tablet, Take 2 tablets by mouth every night at bedtime., Disp: 60 tablet, Rfl: 2  •  vitamin C (ASCORBIC ACID) 250 MG tablet, Take 1 tablet by mouth Daily., Disp: 30 tablet, Rfl: 11  •  zolpidem (AMBIEN) 10 MG tablet, Take 1 tablet by mouth At Night As Needed for Sleep., Disp: 30 tablet, Rfl: 2  •  Elastic Bandages & Supports (ANKLE SPLINT/CANVAS LARGE) misc, 1 Units Daily., Disp: 1 each, Rfl: 0    Objective     Vitals:    07/20/20 1422   BP: 120/62   Pulse: 98   Temp: 97.1 °F (36.2 °C)   SpO2: 99%   Weight: 74.4 kg (164 lb)   Height: 162.6 cm (64\")       Body mass index " is 28.15 kg/m².    No components found for: 2D    Physical Exam   Constitutional: She is oriented to person, place, and time. She appears well-developed and well-nourished.   HENT:   Head: Normocephalic and atraumatic.   Eyes: Conjunctivae are normal.   Neck: Normal range of motion. Neck supple.   Musculoskeletal: Normal range of motion. She exhibits tenderness (Is to palpation of the lateral malleolus.  No bony tenderness). She exhibits no edema or deformity.   Patient with slight edema surrounding the lateral malleolus only.  Increased pain with pointer flexion and dorsiflexion of the left angle   Neurological: She is alert and oriented to person, place, and time.   Skin: Skin is warm and dry. Capillary refill takes less than 2 seconds. No rash noted.   Psychiatric: She has a normal mood and affect. Her behavior is normal. Judgment and thought content normal.   Nursing note and vitals reviewed.      Procedures    Assessment/Plan   Violet was seen today for ankle pain.    Diagnoses and all orders for this visit:    Sprain of left ankle, unspecified ligament, initial encounter  -     Elastic Bandages & Supports (ANKLE SPLINT/CANVAS LARGE) misc; 1 Units Daily.    Advised that the patient use a lace up ankle splint while ambulating in tennis shoes only.  She should also follow the instructions listed below.  Patient Instructions   Ankle Sprain    An ankle sprain is a stretch or tear in a ligament in the ankle. Ligaments are tissues that connect bones to each other.  The two most common types of ankle sprains are:  · Inversion sprain. This happens when the foot turns inward and the ankle rolls outward. It affects the ligament on the outside of the foot (lateral ligament).  · Eversion sprain. This happens when the foot turns outward and the ankle rolls inward. It affects the ligament on the inner side of the foot (medial ligament).  What are the causes?  This condition is often caused by accidentally rolling or twisting  the ankle.  What increases the risk?  You are more likely to develop this condition if you play sports.  What are the signs or symptoms?  Symptoms of this condition include:  · Pain in your ankle.  · Swelling.  · Bruising. This may develop right after you sprain your ankle or 1-2 days later.  · Trouble standing or walking, especially when you turn or change directions.  How is this diagnosed?  This condition is diagnosed with:  · A physical exam. During the exam, your health care provider will press on certain parts of your foot and ankle and try to move them in certain ways.  · X-ray imaging. These may be taken to see how severe the sprain is and to check for broken bones.  How is this treated?  This condition may be treated with:  · A brace or splint. This is used to keep the ankle from moving until it heals.  · An elastic bandage. This is used to support the ankle.  · Crutches.  · Pain medicine.  · Surgery. This may be needed if the sprain is severe.  · Physical therapy. This may help to improve the range of motion in the ankle.  Follow these instructions at home:  If you have a brace or a splint:  · Wear the brace or splint as told by your health care provider. Remove it only as told by your health care provider.  · Loosen the brace or splint if your toes tingle, become numb, or turn cold and blue.  · Keep the brace or splint clean.  · If the brace or splint is not waterproof:  ? Do not let it get wet.  ? Cover it with a watertight covering when you take a bath or a shower.  If you have an elastic bandage (dressing):  · Remove it to shower or bathe.  · Try not to move your ankle much, but wiggle your toes from time to time. This helps to prevent swelling.  · Adjust the dressing to make it more comfortable if it feels too tight.  · Loosen the dressing if you have numbness or tingling in your foot, or if your foot becomes cold and blue.  Managing pain, stiffness, and swelling    · Take over-the-counter and  prescription medicines only as told by your health care provider.  · For 2-3 days, keep your ankle raised (elevated) above the level of your heart as much as possible.  · If directed, put ice on the injured area:  ? If you have a removable brace or splint, remove it as told by your health care provider.  ? Put ice in a plastic bag.  ? Place a towel between your skin and the bag.  ? Leave the ice on for 20 minutes, 2-3 times a day.  General instructions  · Rest your ankle.  · Do not use the injured limb to support your body weight until your health care provider says that you can. Use crutches as told by your health care provider.  · Do not use any products that contain nicotine or tobacco, such as cigarettes, e-cigarettes, and chewing tobacco. If you need help quitting, ask your health care provider.  · Keep all follow-up visits as told by your health care provider. This is important.  Contact a health care provider if:  · You have rapidly increasing bruising or swelling.  · Your pain is not relieved with medicine.  Get help right away if:  · Your foot or toes become numb or blue.  · You have severe pain that gets worse.  Summary  · An ankle sprain is a stretch or tear in a ligament in the ankle. Ligaments are tissues that connect bones to each other.  · This condition is often caused by accidentally rolling or twisting the ankle.  · Symptoms include pain, swelling, bruising, and trouble walking.  · To relieve pain and swelling, put ice on the affected ankle, raise your ankle above the level of your heart, and use an elastic bandage.  · Keep all follow-up visits as told by your health care provider. This is important.  This information is not intended to replace advice given to you by your health care provider. Make sure you discuss any questions you have with your health care provider.  Document Released: 12/18/2006 Document Revised: 09/09/2019 Document Reviewed: 05/14/2019  Elsevier Patient Education © 2020  [employer]      Ankle Sprain, Phase I Rehab  An ankle sprain is an injury to the ligaments of your ankle. Ankle sprains cause stiffness, loss of motion, and loss of strength. Ask your health care provider which exercises are safe for you. Do exercises exactly as told by your health care provider and adjust them as directed. It is normal to feel mild stretching, pulling, tightness, or discomfort as you do these exercises. Stop right away if you feel sudden pain or your pain gets worse. Do not begin these exercises until told by your health care provider.  Stretching and range-of-motion exercises  These exercises warm up your muscles and joints and improve the movement and flexibility of your lower leg and ankle. These exercises also help to relieve pain and stiffness.  Gastroc and soleus stretch  This exercise is also called a calf stretch. It stretches the muscles in the back of the lower leg. These muscles are the gastrocnemius, or gastroc, and the soleus.  1. Sit on the floor with your left / right leg extended.  2. Loop a belt or towel around the ball of your left / right foot. The ball of your foot is on the walking surface, right under your toes.  3. Keep your left / right ankle and foot relaxed and keep your knee straight while you use the belt or towel to pull your foot toward you. You should feel a gentle stretch behind your calf or knee in your gastroc muscle.  4. Hold this position for __________ seconds, then release to the starting position.  5. Repeat the exercise with your knee bent. You can put a pillow or a rolled bath towel under your knee to support it. You should feel a stretch deep in your calf in the soleus muscle or at your Achilles tendon.  Repeat __________ times. Complete this exercise __________ times a day.  Ankle alphabet    1. Sit with your left / right leg supported at the lower leg.  ? Do not rest your foot on anything.  ? Make sure your foot has room to move freely.  2. Think of  your left / right foot as a paintbrush.  ? Move your foot to trace each letter of the alphabet in the air. Keep your hip and knee still while you trace.  ? Make the letters as large as you can without feeling discomfort.  3. Trace every letter from A to Z.  Repeat __________ times. Complete this exercise __________ times a day.  Strengthening exercises  These exercises build strength and endurance in your ankle and lower leg. Endurance is the ability to use your muscles for a long time, even after they get tired.  Ankle dorsiflexion    1. Secure a rubber exercise band or tube to an object, such as a table leg, that will stay still when the band is pulled. Secure the other end around your left / right foot.  2. Sit on the floor facing the object, with your left / right leg extended. The band or tube should be slightly tense when your foot is relaxed.  3. Slowly bring your foot toward you, bringing the top of your foot toward your shin (dorsiflexion), and pulling the band tighter.  4. Hold this position for __________ seconds.  5. Slowly return your foot to the starting position.  Repeat __________ times. Complete this exercise __________ times a day.  Ankle plantar flexion    1. Sit on the floor with your left / right leg extended.  2. Loop a rubber exercise tube or band around the ball of your left / right foot. The ball of your foot is on the walking surface, right under your toes.  ? Hold the ends of the band or tube in your hands.  ? The band or tube should be slightly tense when your foot is relaxed.  3. Slowly point your foot and toes downward to tilt the top of your foot away from your shin (plantar flexion).  4. Hold this position for __________ seconds.  5. Slowly return your foot to the starting position.  Repeat __________ times. Complete this exercise __________ times a day.  Ankle eversion  1. Sit on the floor with your legs straight out in front of you.  2. Loop a rubber exercise band or tube around the  ball of your left / right foot. The ball of your foot is on the walking surface, right under your toes.  ? Hold the ends of the band in your hands, or secure the band to a stable object.  ? The band or tube should be slightly tense when your foot is relaxed.  3. Slowly push your foot outward, away from your other leg (eversion).  4. Hold this position for __________ seconds.  5. Slowly return your foot to the starting position.  Repeat __________ times. Complete this exercise __________ times a day.  This information is not intended to replace advice given to you by your health care provider. Make sure you discuss any questions you have with your health care provider.  Document Released: 07/19/2006 Document Revised: 04/07/2020 Document Reviewed: 09/30/2019  Elsevier Patient Education © 2020 [employer]

## 2020-07-20 NOTE — PATIENT INSTRUCTIONS
Ankle Sprain    An ankle sprain is a stretch or tear in a ligament in the ankle. Ligaments are tissues that connect bones to each other.  The two most common types of ankle sprains are:  · Inversion sprain. This happens when the foot turns inward and the ankle rolls outward. It affects the ligament on the outside of the foot (lateral ligament).  · Eversion sprain. This happens when the foot turns outward and the ankle rolls inward. It affects the ligament on the inner side of the foot (medial ligament).  What are the causes?  This condition is often caused by accidentally rolling or twisting the ankle.  What increases the risk?  You are more likely to develop this condition if you play sports.  What are the signs or symptoms?  Symptoms of this condition include:  · Pain in your ankle.  · Swelling.  · Bruising. This may develop right after you sprain your ankle or 1-2 days later.  · Trouble standing or walking, especially when you turn or change directions.  How is this diagnosed?  This condition is diagnosed with:  · A physical exam. During the exam, your health care provider will press on certain parts of your foot and ankle and try to move them in certain ways.  · X-ray imaging. These may be taken to see how severe the sprain is and to check for broken bones.  How is this treated?  This condition may be treated with:  · A brace or splint. This is used to keep the ankle from moving until it heals.  · An elastic bandage. This is used to support the ankle.  · Crutches.  · Pain medicine.  · Surgery. This may be needed if the sprain is severe.  · Physical therapy. This may help to improve the range of motion in the ankle.  Follow these instructions at home:  If you have a brace or a splint:  · Wear the brace or splint as told by your health care provider. Remove it only as told by your health care provider.  · Loosen the brace or splint if your toes tingle, become numb, or turn cold and blue.  · Keep the brace or  splint clean.  · If the brace or splint is not waterproof:  ? Do not let it get wet.  ? Cover it with a watertight covering when you take a bath or a shower.  If you have an elastic bandage (dressing):  · Remove it to shower or bathe.  · Try not to move your ankle much, but wiggle your toes from time to time. This helps to prevent swelling.  · Adjust the dressing to make it more comfortable if it feels too tight.  · Loosen the dressing if you have numbness or tingling in your foot, or if your foot becomes cold and blue.  Managing pain, stiffness, and swelling    · Take over-the-counter and prescription medicines only as told by your health care provider.  · For 2-3 days, keep your ankle raised (elevated) above the level of your heart as much as possible.  · If directed, put ice on the injured area:  ? If you have a removable brace or splint, remove it as told by your health care provider.  ? Put ice in a plastic bag.  ? Place a towel between your skin and the bag.  ? Leave the ice on for 20 minutes, 2-3 times a day.  General instructions  · Rest your ankle.  · Do not use the injured limb to support your body weight until your health care provider says that you can. Use crutches as told by your health care provider.  · Do not use any products that contain nicotine or tobacco, such as cigarettes, e-cigarettes, and chewing tobacco. If you need help quitting, ask your health care provider.  · Keep all follow-up visits as told by your health care provider. This is important.  Contact a health care provider if:  · You have rapidly increasing bruising or swelling.  · Your pain is not relieved with medicine.  Get help right away if:  · Your foot or toes become numb or blue.  · You have severe pain that gets worse.  Summary  · An ankle sprain is a stretch or tear in a ligament in the ankle. Ligaments are tissues that connect bones to each other.  · This condition is often caused by accidentally rolling or twisting the  ankle.  · Symptoms include pain, swelling, bruising, and trouble walking.  · To relieve pain and swelling, put ice on the affected ankle, raise your ankle above the level of your heart, and use an elastic bandage.  · Keep all follow-up visits as told by your health care provider. This is important.  This information is not intended to replace advice given to you by your health care provider. Make sure you discuss any questions you have with your health care provider.  Document Released: 12/18/2006 Document Revised: 09/09/2019 Document Reviewed: 05/14/2019  [employer] Patient Education © 2020 [employer]      Ankle Sprain, Phase I Rehab  An ankle sprain is an injury to the ligaments of your ankle. Ankle sprains cause stiffness, loss of motion, and loss of strength. Ask your health care provider which exercises are safe for you. Do exercises exactly as told by your health care provider and adjust them as directed. It is normal to feel mild stretching, pulling, tightness, or discomfort as you do these exercises. Stop right away if you feel sudden pain or your pain gets worse. Do not begin these exercises until told by your health care provider.  Stretching and range-of-motion exercises  These exercises warm up your muscles and joints and improve the movement and flexibility of your lower leg and ankle. These exercises also help to relieve pain and stiffness.  Gastroc and soleus stretch  This exercise is also called a calf stretch. It stretches the muscles in the back of the lower leg. These muscles are the gastrocnemius, or gastroc, and the soleus.  1. Sit on the floor with your left / right leg extended.  2. Loop a belt or towel around the ball of your left / right foot. The ball of your foot is on the walking surface, right under your toes.  3. Keep your left / right ankle and foot relaxed and keep your knee straight while you use the belt or towel to pull your foot toward you. You should feel a gentle stretch behind  your calf or knee in your gastroc muscle.  4. Hold this position for __________ seconds, then release to the starting position.  5. Repeat the exercise with your knee bent. You can put a pillow or a rolled bath towel under your knee to support it. You should feel a stretch deep in your calf in the soleus muscle or at your Achilles tendon.  Repeat __________ times. Complete this exercise __________ times a day.  Ankle alphabet    1. Sit with your left / right leg supported at the lower leg.  ? Do not rest your foot on anything.  ? Make sure your foot has room to move freely.  2. Think of your left / right foot as a paintbrush.  ? Move your foot to trace each letter of the alphabet in the air. Keep your hip and knee still while you trace.  ? Make the letters as large as you can without feeling discomfort.  3. Trace every letter from A to Z.  Repeat __________ times. Complete this exercise __________ times a day.  Strengthening exercises  These exercises build strength and endurance in your ankle and lower leg. Endurance is the ability to use your muscles for a long time, even after they get tired.  Ankle dorsiflexion    1. Secure a rubber exercise band or tube to an object, such as a table leg, that will stay still when the band is pulled. Secure the other end around your left / right foot.  2. Sit on the floor facing the object, with your left / right leg extended. The band or tube should be slightly tense when your foot is relaxed.  3. Slowly bring your foot toward you, bringing the top of your foot toward your shin (dorsiflexion), and pulling the band tighter.  4. Hold this position for __________ seconds.  5. Slowly return your foot to the starting position.  Repeat __________ times. Complete this exercise __________ times a day.  Ankle plantar flexion    1. Sit on the floor with your left / right leg extended.  2. Loop a rubber exercise tube or band around the ball of your left / right foot. The ball of your foot  is on the walking surface, right under your toes.  ? Hold the ends of the band or tube in your hands.  ? The band or tube should be slightly tense when your foot is relaxed.  3. Slowly point your foot and toes downward to tilt the top of your foot away from your shin (plantar flexion).  4. Hold this position for __________ seconds.  5. Slowly return your foot to the starting position.  Repeat __________ times. Complete this exercise __________ times a day.  Ankle eversion  1. Sit on the floor with your legs straight out in front of you.  2. Loop a rubber exercise band or tube around the ball of your left / right foot. The ball of your foot is on the walking surface, right under your toes.  ? Hold the ends of the band in your hands, or secure the band to a stable object.  ? The band or tube should be slightly tense when your foot is relaxed.  3. Slowly push your foot outward, away from your other leg (eversion).  4. Hold this position for __________ seconds.  5. Slowly return your foot to the starting position.  Repeat __________ times. Complete this exercise __________ times a day.  This information is not intended to replace advice given to you by your health care provider. Make sure you discuss any questions you have with your health care provider.  Document Released: 07/19/2006 Document Revised: 04/07/2020 Document Reviewed: 09/30/2019  [employer] Patient Education © 2020 [employer]

## 2020-08-31 ENCOUNTER — OFFICE VISIT (OUTPATIENT)
Dept: FAMILY MEDICINE CLINIC | Facility: CLINIC | Age: 60
End: 2020-08-31

## 2020-08-31 VITALS
SYSTOLIC BLOOD PRESSURE: 124 MMHG | WEIGHT: 164.8 LBS | HEART RATE: 74 BPM | HEIGHT: 64 IN | TEMPERATURE: 96.9 F | BODY MASS INDEX: 28.13 KG/M2 | DIASTOLIC BLOOD PRESSURE: 70 MMHG | OXYGEN SATURATION: 100 %

## 2020-08-31 DIAGNOSIS — R14.0 ABDOMINAL BLOATING: ICD-10-CM

## 2020-08-31 DIAGNOSIS — R11.2 NAUSEA AND VOMITING, INTRACTABILITY OF VOMITING NOT SPECIFIED, UNSPECIFIED VOMITING TYPE: ICD-10-CM

## 2020-08-31 DIAGNOSIS — K59.03 DRUG-INDUCED CONSTIPATION: Primary | ICD-10-CM

## 2020-08-31 PROCEDURE — 99213 OFFICE O/P EST LOW 20 MIN: CPT | Performed by: NURSE PRACTITIONER

## 2020-08-31 RX ORDER — PANTOPRAZOLE SODIUM 40 MG/1
40 TABLET, DELAYED RELEASE ORAL DAILY
Qty: 30 TABLET | Refills: 2 | Status: SHIPPED | OUTPATIENT
Start: 2020-08-31 | End: 2020-11-13 | Stop reason: SDUPTHER

## 2020-08-31 NOTE — PROGRESS NOTES
Subjective   Violet Rahman is a 60 y.o. female.     Chief Complaint   Patient presents with   • Bloated     face hot        History of Present Illness     Patient is here today with complaint of bloating feeling in stomach.   She states she was told her had polyps 10 years ago and needs colonoscopy.  She also reports that she has swelling worse at night and feeling like her face is hot.       The following portions of the patient's history were reviewed and updated as appropriate: allergies, current medications, past family history, past medical history, past social history, past surgical history and problem list.    Past Medical History:   Diagnosis Date   • Anxiety    • Depression    • Diabetes (CMS/HCC)    • Hypertension    • Hypothyroidism    • OA (osteoarthritis)    • OAB (overactive bladder)    • AMELIA (obstructive sleep apnea)    • Stomach ulcer        Past Surgical History:   Procedure Laterality Date   •  SECTION      X2   • COLONOSCOPY N/A 2016    Procedure: COLONOSCOPY;  Surgeon: Raiza aTlbot MD;  Location: [facility];  Service:    • ENDOSCOPY N/A 2016    Procedure: ESOPHAGOGASTRODUODENOSCOPY with biopsies  and link test;  Surgeon: Raiza Talbot MD;  Location: [facility];  Service:    • KNEE SURGERY Right    • LAPAROSCOPIC CHOLECYSTECTOMY         Family History   Problem Relation Age of Onset   • Hypertension Mother    • Stroke Mother    • Diabetes Mother    • Lung cancer Father    • Diabetes Sister    • Colon cancer Sister        Social History     Socioeconomic History   • Marital status: Single     Spouse name: Not on file   • Number of children: Not on file   • Years of education: Not on file   • Highest education level: Not on file   Tobacco Use   • Smoking status: Never Smoker   • Smokeless tobacco: Never Used   Substance and Sexual Activity   • Alcohol use: No   • Drug use: No   • Sexual activity: Defer         Current Outpatient Medications:   •  atenolol (TENORMIN) 25 MG  "tablet, Take 1 tablet by mouth once daily, Disp: 90 tablet, Rfl: 0  •  BREO ELLIPTA 100-25 MCG/INH inhaler, Inhale 1 puff Daily. Rinse mouth after each use, Disp: , Rfl: 11  •  Dulaglutide 0.75 MG/0.5ML solution pen-injector, Inject 0.75 mg under the skin into the appropriate area as directed 1 (One) Time Per Week., Disp: 4 pen, Rfl: 2  •  DULoxetine (Cymbalta) 20 MG capsule, Take 1 capsule by mouth Daily., Disp: 30 capsule, Rfl: 2  •  Elastic Bandages & Supports (ANKLE SPLINT/CANVAS LARGE) misc, 1 Units Daily., Disp: 1 each, Rfl: 0  •  fluticasone (FLONASE) 50 MCG/ACT nasal spray, 2 sprays into the nostril(s) as directed by provider Daily., Disp: 16 g, Rfl: 11  •  glucose blood test strip, , Disp: , Rfl:   •  HYDROcodone-acetaminophen (NORCO) 7.5-325 MG per tablet, , Disp: , Rfl:   •  Insulin Pen Needle (PEN NEEDLES 5/16\") 30G X 8 MM misc, 1 Units As Needed (with trulicity)., Disp: 100 each, Rfl: 0  •  levothyroxine (SYNTHROID, LEVOTHROID) 100 MCG tablet, Take 1 tablet by mouth Daily., Disp: 30 tablet, Rfl: 2  •  linaclotide (LINZESS) 290 MCG capsule capsule, Take 1 capsule by mouth Every Morning Before Breakfast., Disp: 30 capsule, Rfl: 2  •  Mirabegron ER (MYRBETRIQ) 50 MG tablet sustained-release 24 hour 24 hr tablet, Take 50 mg by mouth Daily., Disp: 14 tablet, Rfl: 0  •  Multiple Vitamins-Minerals (MULTIVITAMIN WITH MINERALS) tablet, Take 1 tablet by mouth Daily., Disp: 30 tablet, Rfl: 11  •  ondansetron ODT (ZOFRAN-ODT) 4 MG disintegrating tablet, Take 1 tablet by mouth Every 8 (Eight) Hours As Needed for Nausea or Vomiting., Disp: 12 tablet, Rfl: 2  •  PROAIR  (90 Base) MCG/ACT inhaler, Inhale See Admin Instructions. Inhale 2 puffs by mouth every 4 to 6 hours as needed, Disp: , Rfl: 3  •  rosuvastatin (CRESTOR) 10 MG tablet, Take 1 tablet by mouth Daily., Disp: 30 tablet, Rfl: 2  •  zolpidem (AMBIEN) 10 MG tablet, Take 1 tablet by mouth At Night As Needed for Sleep., Disp: 30 tablet, Rfl: 2  •  " "pantoprazole (PROTONIX) 40 MG EC tablet, Take 1 tablet by mouth Daily., Disp: 30 tablet, Rfl: 2  •  vitamin C (ASCORBIC ACID) 250 MG tablet, Take 1 tablet by mouth Daily., Disp: 30 tablet, Rfl: 11    Review of Systems   Constitutional: Negative for fatigue and fever.   Respiratory: Negative for cough, shortness of breath and wheezing.    Cardiovascular: Negative for chest pain.   Gastrointestinal: Positive for abdominal pain (LLQ), constipation, nausea, vomiting and GERD. Negative for blood in stool and diarrhea.        Abdominal bloating   Genitourinary: Negative for dysuria and urgency.   Neurological: Negative for dizziness and headache.       Objective   Vitals:    08/31/20 1021   BP: 124/70   Pulse: 74   Temp: 96.9 °F (36.1 °C)   SpO2: 100%   Weight: 74.8 kg (164 lb 12.8 oz)   Height: 162.6 cm (64\")     Body mass index is 28.29 kg/m².  Physical Exam   Constitutional: She is oriented to person, place, and time. She appears well-developed and well-nourished.   Cardiovascular: Normal rate, regular rhythm, normal heart sounds and intact distal pulses.   Pulmonary/Chest: Effort normal and breath sounds normal.   Abdominal: Soft. Bowel sounds are normal. She exhibits no mass. There is tenderness (mild LUQ, moderate LLQ). There is no guarding. No hernia.   Neurological: She is alert and oriented to person, place, and time.   Psychiatric: She has a normal mood and affect. Her behavior is normal. Judgment and thought content normal.         Assessment/Plan   Violet was seen today for bloated.    Diagnoses and all orders for this visit:    Drug-induced constipation  -     Ambulatory Referral to Gastroenterology    Nausea and vomiting, intractability of vomiting not specified, unspecified vomiting type  -     Ambulatory Referral to Gastroenterology    Abdominal bloating  -     Ambulatory Referral to Gastroenterology    Other orders  -     linaclotide (LINZESS) 290 MCG capsule capsule; Take 1 capsule by mouth Every Morning " Before Breakfast.  -     pantoprazole (PROTONIX) 40 MG EC tablet; Take 1 tablet by mouth Daily.        I am going to increase the dose of her Linzess to see if that helps with constipation and left lower quadrant bloating and abdominal pain.  Also going to start her on a medicine for GERD to see if this helps with symptoms.  Good to go on iron refer her to gastroenterology for the symptoms as well as for follow-up on polyp on colonoscopy that she states she is due for.  If any worsening symptoms, fever, intractable nausea and vomiting, she is to go to the ER for immediate assistance.  Follow-up in office as needed.         There are no Patient Instructions on file for this visit.

## 2020-09-08 RX ORDER — DULOXETIN HYDROCHLORIDE 20 MG/1
CAPSULE, DELAYED RELEASE ORAL
Qty: 30 CAPSULE | Refills: 2 | Status: SHIPPED | OUTPATIENT
Start: 2020-09-08 | End: 2020-11-12 | Stop reason: ALTCHOICE

## 2020-09-09 ENCOUNTER — OFFICE VISIT (OUTPATIENT)
Dept: FAMILY MEDICINE CLINIC | Facility: CLINIC | Age: 60
End: 2020-09-09

## 2020-09-09 VITALS
BODY MASS INDEX: 28.31 KG/M2 | OXYGEN SATURATION: 99 % | WEIGHT: 165.8 LBS | HEART RATE: 67 BPM | DIASTOLIC BLOOD PRESSURE: 68 MMHG | SYSTOLIC BLOOD PRESSURE: 118 MMHG | HEIGHT: 64 IN | TEMPERATURE: 96.9 F

## 2020-09-09 DIAGNOSIS — J30.9 ALLERGIC RHINITIS, UNSPECIFIED SEASONALITY, UNSPECIFIED TRIGGER: ICD-10-CM

## 2020-09-09 DIAGNOSIS — F41.9 ANXIETY: ICD-10-CM

## 2020-09-09 DIAGNOSIS — I10 ESSENTIAL HYPERTENSION: Primary | ICD-10-CM

## 2020-09-09 DIAGNOSIS — G47.09 OTHER INSOMNIA: ICD-10-CM

## 2020-09-09 PROCEDURE — 99213 OFFICE O/P EST LOW 20 MIN: CPT | Performed by: NURSE PRACTITIONER

## 2020-09-09 RX ORDER — HYDROXYZINE 50 MG/1
50-100 TABLET, FILM COATED ORAL NIGHTLY PRN
Qty: 60 TABLET | Refills: 2 | Status: SHIPPED | OUTPATIENT
Start: 2020-09-09 | End: 2020-10-28

## 2020-09-09 NOTE — PROGRESS NOTES
"Subjective   Violet Rahman is a 60 y.o. female.     Chief Complaint   Patient presents with   • Insomnia   • Headache   • Fatigue        History of Present Illness     Patient is here today for follow up on chronic conditions.       She has been on ambien for a long time.  Has trialed multiple other things in the past for sleep. We have discussed side effects and issues with medication.  Patient verbalizes understanding.   I am going to decrease dose of ambien today to 5mg due to age and being on hydrocodone   She states she hasn't been sleeping well lately.  She was sleeping when I walked into the room. She states she has been waking up at 4am when I walk into the room.     She cannot remember anything else she has tried in the past for sleep  RICARDO-today  UDS-today  CONSENT-10/31/2019     MIGRAINES: she states she stopped taking the topamax because it felt like she was still getting the headaches and it make her feel fatigued.       DEPRESSION: feels like cymbalta is working well     DIABETES: feels like sugars are going up at night.  Would like to trial trulicity for diabetes  A1c is well controlled at 5.9.  We started trulicity.  She states she has been checking it at home and it is sometimes high      NEUROPATHY: Has been doing okay with leg pain without either one so we will continue without and see how she does.   She states her calves and feet have been very tired lately, however     HYPERTENSION: denies any issues with medication    HYPERLIPIDEMIA: denies any issues with medication     ALLERGIES: denies any issues with medications is fasting for labs today    GI specialist- has appt on 9/22    \"Also 10 years ago I went to heart doctor and I stopped going and I need to go again\"      The following portions of the patient's history were reviewed and updated as appropriate: allergies, current medications, past family history, past medical history, past social history, past surgical history and problem " list.    Past Medical History:   Diagnosis Date   • Anxiety    • Depression    • Diabetes (CMS/HCC)    • Hypertension    • Hypothyroidism    • OA (osteoarthritis)    • OAB (overactive bladder)    • AMELIA (obstructive sleep apnea)    • Stomach ulcer        Past Surgical History:   Procedure Laterality Date   •  SECTION      X2   • COLONOSCOPY N/A 2016    Procedure: COLONOSCOPY;  Surgeon: Raiza Talbot MD;  Location: [facility];  Service:    • ENDOSCOPY N/A 2016    Procedure: ESOPHAGOGASTRODUODENOSCOPY with biopsies  and link test;  Surgeon: Riaza Talbot MD;  Location: [facility] OR;  Service:    • KNEE SURGERY Right    • LAPAROSCOPIC CHOLECYSTECTOMY         Family History   Problem Relation Age of Onset   • Hypertension Mother    • Stroke Mother    • Diabetes Mother    • Lung cancer Father    • Diabetes Sister    • Colon cancer Sister        Social History     Socioeconomic History   • Marital status: Single     Spouse name: Not on file   • Number of children: Not on file   • Years of education: Not on file   • Highest education level: Not on file   Tobacco Use   • Smoking status: Never Smoker   • Smokeless tobacco: Never Used   Substance and Sexual Activity   • Alcohol use: No   • Drug use: No   • Sexual activity: Defer         Current Outpatient Medications:   •  atenolol (TENORMIN) 25 MG tablet, Take 1 tablet by mouth once daily, Disp: 90 tablet, Rfl: 0  •  BREO ELLIPTA 100-25 MCG/INH inhaler, Inhale 1 puff Daily. Rinse mouth after each use, Disp: , Rfl: 11  •  Dulaglutide 0.75 MG/0.5ML solution pen-injector, Inject 0.75 mg under the skin into the appropriate area as directed 1 (One) Time Per Week., Disp: 4 pen, Rfl: 2  •  DULoxetine (CYMBALTA) 20 MG capsule, Take 1 capsule by mouth once daily, Disp: 30 capsule, Rfl: 2  •  Elastic Bandages & Supports (ANKLE SPLINT/CANVAS LARGE) misc, 1 Units Daily., Disp: 1 each, Rfl: 0  •  fluticasone (FLONASE) 50 MCG/ACT nasal spray, 2 sprays into the  "nostril(s) as directed by provider Daily., Disp: 16 g, Rfl: 11  •  glucose blood test strip, , Disp: , Rfl:   •  HYDROcodone-acetaminophen (NORCO) 7.5-325 MG per tablet, , Disp: , Rfl:   •  Insulin Pen Needle (PEN NEEDLES 5/16\") 30G X 8 MM misc, 1 Units As Needed (with trulicity)., Disp: 100 each, Rfl: 0  •  levothyroxine (SYNTHROID, LEVOTHROID) 100 MCG tablet, Take 1 tablet by mouth Daily., Disp: 30 tablet, Rfl: 2  •  linaclotide (LINZESS) 290 MCG capsule capsule, Take 1 capsule by mouth Every Morning Before Breakfast., Disp: 30 capsule, Rfl: 2  •  Mirabegron ER (MYRBETRIQ) 50 MG tablet sustained-release 24 hour 24 hr tablet, Take 50 mg by mouth Daily., Disp: 14 tablet, Rfl: 0  •  Multiple Vitamins-Minerals (MULTIVITAMIN WITH MINERALS) tablet, Take 1 tablet by mouth Daily., Disp: 30 tablet, Rfl: 11  •  ondansetron ODT (ZOFRAN-ODT) 4 MG disintegrating tablet, Take 1 tablet by mouth Every 8 (Eight) Hours As Needed for Nausea or Vomiting., Disp: 12 tablet, Rfl: 2  •  pantoprazole (PROTONIX) 40 MG EC tablet, Take 1 tablet by mouth Daily., Disp: 30 tablet, Rfl: 2  •  PROAIR  (90 Base) MCG/ACT inhaler, Inhale See Admin Instructions. Inhale 2 puffs by mouth every 4 to 6 hours as needed, Disp: , Rfl: 3  •  rosuvastatin (CRESTOR) 10 MG tablet, Take 1 tablet by mouth Daily., Disp: 30 tablet, Rfl: 2  •  vitamin C (ASCORBIC ACID) 250 MG tablet, Take 1 tablet by mouth Daily., Disp: 30 tablet, Rfl: 11  •  hydrOXYzine (ATARAX) 50 MG tablet, Take 1-2 tablets by mouth At Night As Needed (sleep)., Disp: 60 tablet, Rfl: 2    Review of Systems   Constitutional: Negative for fatigue and fever.   Respiratory: Negative for cough, shortness of breath and wheezing.    Cardiovascular: Negative for chest pain.   Gastrointestinal: Negative for abdominal pain, constipation, diarrhea, nausea and vomiting.   Genitourinary: Negative for dysuria and urgency.   Skin: Negative.    Neurological: Negative for dizziness and headache. " "  Psychiatric/Behavioral: Positive for sleep disturbance. Negative for suicidal ideas and depressed mood. The patient is not nervous/anxious.        Objective   Vitals:    09/09/20 1346   BP: 118/68   Pulse: 67   Temp: 96.9 °F (36.1 °C)   SpO2: 99%   Weight: 75.2 kg (165 lb 12.8 oz)   Height: 162.6 cm (64\")     Body mass index is 28.46 kg/m².  Physical Exam   Constitutional: She is oriented to person, place, and time. She appears well-developed and well-nourished.   Cardiovascular: Normal rate, regular rhythm, normal heart sounds and intact distal pulses.   Pulmonary/Chest: Effort normal and breath sounds normal.   Neurological: She is alert and oriented to person, place, and time.   Psychiatric: Her behavior is normal. Judgment and thought content normal. Her mood appears anxious. Her speech is rapid and/or pressured. Cognition and memory are normal.         Assessment/Plan   Violet was seen today for insomnia, headache and fatigue.    Diagnoses and all orders for this visit:    Essential hypertension  -     Ambulatory Referral to Cardiology    Anxiety    Other insomnia    Allergic rhinitis, unspecified seasonality, unspecified trigger    Other orders  -     hydrOXYzine (ATARAX) 50 MG tablet; Take 1-2 tablets by mouth At Night As Needed (sleep).        Cardiology-referral back for follow-up per patient request    Insomnia-alternating decrease Ambien to 5 mg but presents this is not working I would like her to trial new medicine.  We will trial hydroxyzine and see how this works for her.  Patient is agreeable to this.    Allergies continue Flonase.  Hydroxyzine may help with this as well.    Follow-up in 1 month.  We will recheck diabetes at this time and see how sleeping medicine is working for her.             There are no Patient Instructions on file for this visit.        "

## 2020-09-15 RX ORDER — DULAGLUTIDE 0.75 MG/.5ML
INJECTION, SOLUTION SUBCUTANEOUS
Qty: 4 ML | Refills: 0 | Status: SHIPPED | OUTPATIENT
Start: 2020-09-15 | End: 2020-10-02

## 2020-09-16 ENCOUNTER — TELEPHONE (OUTPATIENT)
Dept: FAMILY MEDICINE CLINIC | Facility: CLINIC | Age: 60
End: 2020-09-16

## 2020-09-16 RX ORDER — TRAZODONE HYDROCHLORIDE 100 MG/1
100 TABLET ORAL NIGHTLY
Qty: 30 TABLET | Refills: 2 | Status: SHIPPED | OUTPATIENT
Start: 2020-09-16 | End: 2020-09-22

## 2020-09-22 ENCOUNTER — PREP FOR SURGERY (OUTPATIENT)
Dept: OTHER | Facility: HOSPITAL | Age: 60
End: 2020-09-22

## 2020-09-22 ENCOUNTER — OFFICE VISIT (OUTPATIENT)
Dept: GASTROENTEROLOGY | Facility: CLINIC | Age: 60
End: 2020-09-22

## 2020-09-22 VITALS
HEART RATE: 75 BPM | WEIGHT: 169 LBS | SYSTOLIC BLOOD PRESSURE: 120 MMHG | TEMPERATURE: 97.1 F | DIASTOLIC BLOOD PRESSURE: 78 MMHG | OXYGEN SATURATION: 98 % | BODY MASS INDEX: 28.85 KG/M2 | HEIGHT: 64 IN

## 2020-09-22 DIAGNOSIS — Z12.11 COLON CANCER SCREENING: ICD-10-CM

## 2020-09-22 DIAGNOSIS — R11.0 NAUSEA: ICD-10-CM

## 2020-09-22 DIAGNOSIS — K21.00 GASTROESOPHAGEAL REFLUX DISEASE WITH ESOPHAGITIS: Primary | ICD-10-CM

## 2020-09-22 DIAGNOSIS — Z80.0 FAMILY HISTORY OF COLON CANCER: ICD-10-CM

## 2020-09-22 DIAGNOSIS — R13.10 DYSPHAGIA, UNSPECIFIED TYPE: ICD-10-CM

## 2020-09-22 DIAGNOSIS — R10.32 ABDOMINAL PAIN, LEFT LOWER QUADRANT: ICD-10-CM

## 2020-09-22 DIAGNOSIS — R14.0 BLOATING: ICD-10-CM

## 2020-09-22 DIAGNOSIS — K59.00 CONSTIPATION, UNSPECIFIED CONSTIPATION TYPE: ICD-10-CM

## 2020-09-22 PROCEDURE — 99204 OFFICE O/P NEW MOD 45 MIN: CPT | Performed by: INTERNAL MEDICINE

## 2020-09-22 RX ORDER — EPINEPHRINE 0.3 MG/.3ML
INJECTION SUBCUTANEOUS
COMMUNITY
Start: 2020-09-01

## 2020-09-22 RX ORDER — HYDROCODONE BITARTRATE AND ACETAMINOPHEN 10; 325 MG/1; MG/1
1 TABLET ORAL 3 TIMES DAILY PRN
COMMUNITY
Start: 2020-09-01 | End: 2022-12-13

## 2020-09-22 NOTE — PATIENT INSTRUCTIONS
1. For further evaluation of GERD and difficulty swallowing, we will schedule an EGD.    2. For colon cancer screening, LLQ abdominal pain, and weight loss as well as your family history of colon cancer in your sister, we will schedule a colonoscopy.     3. Continue pantoprazole 40 mg once daily for GERD.     4. For constipation, continue Linzess 290 mcg once daily on an empty stomach.

## 2020-09-22 NOTE — PROGRESS NOTES
Constipation and Abdominal Pain (Pt stated sx started about a month ago )      HPI  Patient here today for consultation. An  is present during the visit to facil    She had an EGD and colonoscopy in 2016.  The EGD apparently showed esophagitis and gastritis and was recommended that she have a repeat in 1 year and also her colonoscopy was unremarkable but apparently the prep was suboptimal and she was recommended to have a repeat in 1 year with a 2-day prep.  She does have a family history of colon cancer in her sister.  Patient suffers from a lifelong history of constipation nausea and GERD and is diabetic.    She reports that tomatoes and orange juice will worsen symptoms. She reports some dysphagia, states that food seems to stick in her esophagus. She reports epigastric pain that is worsening. She reports frequent belching. She takes pantoprazole 40 mg once daily. She also reports nausea, she uses Zofran intermittently.     She reports constipation and takes Linzess 290 mcg daily. She reports some LLQ abdominal pain. She reports some weight loss, which could be secondary to use of Trulicity for her diabetes.   Review of Systems   Constitutional: Negative for appetite change, chills, diaphoresis, fatigue, fever and unexpected weight change.   HENT: Negative for dental problem, ear pain, mouth sores, rhinorrhea, sore throat and voice change.    Eyes: Negative for pain, redness and visual disturbance.   Respiratory: Negative for cough, chest tightness and wheezing.    Cardiovascular: Negative for chest pain, palpitations and leg swelling.   Endocrine: Negative for cold intolerance, heat intolerance, polydipsia, polyphagia and polyuria.   Genitourinary: Negative for dysuria, frequency, hematuria and urgency.   Musculoskeletal: Positive for back pain. Negative for arthralgias, joint swelling, myalgias and neck pain.   Skin: Negative for rash.   Allergic/Immunologic: Negative for environmental allergies,  food allergies and immunocompromised state.   Neurological: Positive for headaches. Negative for dizziness, seizures, weakness and numbness.   Hematological: Does not bruise/bleed easily.   Psychiatric/Behavioral: Negative for sleep disturbance. The patient is not nervous/anxious.         I have reviewed and confirmed the accuracy of the HPI and ROS as documented by the PATRIZIA Lewis     Problem List:    Patient Active Problem List   Diagnosis   • Constipation   • Gastroesophageal reflux disease   • Family history of colon cancer requiring screening colonoscopy   • Constipation   • Nausea       Medical History:    Past Medical History:   Diagnosis Date   • Anxiety    • Depression    • Diabetes (CMS/HCC)    • Hypertension    • Hypothyroidism    • OA (osteoarthritis)    • OAB (overactive bladder)    • AMELIA (obstructive sleep apnea)    • Stomach ulcer         Social History:    Social History     Socioeconomic History   • Marital status: Single     Spouse name: Not on file   • Number of children: Not on file   • Years of education: Not on file   • Highest education level: Not on file   Tobacco Use   • Smoking status: Never Smoker   • Smokeless tobacco: Never Used   Substance and Sexual Activity   • Alcohol use: No   • Drug use: No   • Sexual activity: Defer       Family History:   Family History   Problem Relation Age of Onset   • Hypertension Mother    • Stroke Mother    • Diabetes Mother    • Lung cancer Father    • Diabetes Sister    • Colon cancer Sister        Surgical History:   Past Surgical History:   Procedure Laterality Date   •  SECTION      X2   • COLONOSCOPY N/A 2016    Procedure: COLONOSCOPY;  Surgeon: Raiza Talbot MD;  Location: [facility] OR;  Service:    • ENDOSCOPY N/A 2016    Procedure: ESOPHAGOGASTRODUODENOSCOPY with biopsies  and link test;  Surgeon: Raiza Talbot MD;  Location: [facility] OR;  Service:    • KNEE SURGERY Right    • LAPAROSCOPIC CHOLECYSTECTOMY    "        Current Outpatient Medications:   •  atenolol (TENORMIN) 25 MG tablet, Take 1 tablet by mouth once daily, Disp: 90 tablet, Rfl: 0  •  BREO ELLIPTA 100-25 MCG/INH inhaler, Inhale 1 puff Daily. Rinse mouth after each use, Disp: , Rfl: 11  •  DULoxetine (CYMBALTA) 20 MG capsule, Take 1 capsule by mouth once daily, Disp: 30 capsule, Rfl: 2  •  Elastic Bandages & Supports (ANKLE SPLINT/CANVAS LARGE) misc, 1 Units Daily., Disp: 1 each, Rfl: 0  •  EPINEPHrine (EPIPEN) 0.3 MG/0.3ML solution auto-injector injection, AS DIRECTED. USE IN CASE OF MEDICAL EMERGENCY, Disp: , Rfl:   •  fluticasone (FLONASE) 50 MCG/ACT nasal spray, 2 sprays into the nostril(s) as directed by provider Daily., Disp: 16 g, Rfl: 11  •  glucose blood test strip, , Disp: , Rfl:   •  HYDROcodone-acetaminophen (NORCO)  MG per tablet, Take 1 tablet by mouth 3 (Three) Times a Day As Needed. for pain, Disp: , Rfl:   •  hydrOXYzine (ATARAX) 50 MG tablet, Take 1-2 tablets by mouth At Night As Needed (sleep)., Disp: 60 tablet, Rfl: 2  •  Insulin Pen Needle (PEN NEEDLES 5/16\") 30G X 8 MM misc, 1 Units As Needed (with trulicity)., Disp: 100 each, Rfl: 0  •  levothyroxine (SYNTHROID, LEVOTHROID) 100 MCG tablet, Take 1 tablet by mouth Daily., Disp: 30 tablet, Rfl: 2  •  linaclotide (LINZESS) 290 MCG capsule capsule, Take 1 capsule by mouth Every Morning Before Breakfast., Disp: 30 capsule, Rfl: 2  •  ondansetron ODT (ZOFRAN-ODT) 4 MG disintegrating tablet, Take 1 tablet by mouth Every 8 (Eight) Hours As Needed for Nausea or Vomiting., Disp: 12 tablet, Rfl: 2  •  pantoprazole (PROTONIX) 40 MG EC tablet, Take 1 tablet by mouth Daily., Disp: 30 tablet, Rfl: 2  •  PROAIR  (90 Base) MCG/ACT inhaler, Inhale See Admin Instructions. Inhale 2 puffs by mouth every 4 to 6 hours as needed, Disp: , Rfl: 3  •  rosuvastatin (CRESTOR) 10 MG tablet, Take 1 tablet by mouth Daily., Disp: 30 tablet, Rfl: 2  •  Trulicity 0.75 MG/0.5ML solution pen-injector, INJECT 1 "  SUBCUTANEOUSLY ONCE A WEEK, Disp: 4 mL, Rfl: 0  •  Mirabegron ER (MYRBETRIQ) 50 MG tablet sustained-release 24 hour 24 hr tablet, Take 50 mg by mouth Daily., Disp: 14 tablet, Rfl: 0    Allergies: No Known Allergies     The following portions of the patient's history were reviewed and updated as appropriate: allergies, current medications, past family history, past medical history, past social history, past surgical history and problem list.    Vitals:    09/22/20 1353   BP: 120/78   Pulse: 75   Temp: 97.1 °F (36.2 °C)   SpO2: 98%         09/22/20  1353   Weight: 76.7 kg (169 lb)     Body mass index is 28.99 kg/m².      PHYSICAL EXAM:  Physical Exam  Vitals signs reviewed.   Constitutional:       Appearance: Normal appearance. She is well-developed.   HENT:      Nose: Nose normal. No nasal deformity.   Eyes:      General: No scleral icterus.  Neck:      Trachea: No tracheal deviation.   Pulmonary:      Effort: Pulmonary effort is normal. No respiratory distress.      Breath sounds: Normal breath sounds.   Abdominal:      General: Bowel sounds are normal. There is no distension.      Palpations: Abdomen is soft. Abdomen is not rigid. There is no shifting dullness.      Tenderness: There is abdominal tenderness in the epigastric area and left lower quadrant. There is no guarding or rebound.      Hernia: No hernia is present.   Lymphadenopathy:      Comments: No periumbilical lymphadenopathy   Skin:     General: Skin is warm.   Neurological:      Mental Status: She is alert.   Psychiatric:         Behavior: Behavior normal.             Assessment/ Plan  Violet was seen today for constipation and abdominal pain.    Diagnoses and all orders for this visit:    Gastroesophageal reflux disease with esophagitis    Constipation, unspecified constipation type    Family history of colon cancer    Nausea    Bloating    Dysphagia, unspecified type    Abdominal pain, left lower quadrant         No follow-ups on file.    Patient  Instructions   1. For further evaluation of GERD and difficulty swallowing, we will schedule an EGD.    2. For colon cancer screening, LLQ abdominal pain, and weight loss as well as your family history of colon cancer in your sister, we will schedule a colonoscopy.     3. Continue pantoprazole 40 mg once daily for GERD.     4. For constipation, continue Linzess 290 mcg once daily on an empty stomach.         Documentation by Drea ACHARYA acting as a scribe in the following sections (ROS, HPI, Assessment, Plan) for the undersigned provider.     Discussion:  Patient for consultation.  She is having worsening reflux disease as well as development of dysphagia which is new.  She also has a family history of colon cancer and her last colonoscopy was somewhat limited by the quality of the prep.  We will proceed with an EGD for evaluation of dysphagia and colonoscopy prior screening due to family history of colon cancer in her sister.  She will continue Linzess for chronic constipation.

## 2020-09-24 ENCOUNTER — TELEPHONE (OUTPATIENT)
Dept: FAMILY MEDICINE CLINIC | Facility: CLINIC | Age: 60
End: 2020-09-24

## 2020-09-24 DIAGNOSIS — G47.09 OTHER INSOMNIA: Primary | ICD-10-CM

## 2020-09-24 NOTE — TELEPHONE ENCOUNTER
PATIENT CALLED STATED THE HYDROXYZINE HELPED HER SLEEP FOR 3 DAYS, AND THEN STOPPED WORKING. SHE IS WANTING TO KNOW IF SOMETHING ELSE CAN BE SENT IN. PLEASE ADVISE.

## 2020-09-28 DIAGNOSIS — R11.2 NAUSEA AND VOMITING, INTRACTABILITY OF VOMITING NOT SPECIFIED, UNSPECIFIED VOMITING TYPE: ICD-10-CM

## 2020-09-28 RX ORDER — ONDANSETRON 4 MG/1
TABLET, ORALLY DISINTEGRATING ORAL
Qty: 12 TABLET | Refills: 0 | Status: SHIPPED | OUTPATIENT
Start: 2020-09-28 | End: 2020-11-02

## 2020-09-30 ENCOUNTER — TELEPHONE (OUTPATIENT)
Dept: FAMILY MEDICINE CLINIC | Facility: CLINIC | Age: 60
End: 2020-09-30

## 2020-10-01 ENCOUNTER — TELEPHONE (OUTPATIENT)
Dept: FAMILY MEDICINE CLINIC | Facility: CLINIC | Age: 60
End: 2020-10-01

## 2020-10-01 NOTE — TELEPHONE ENCOUNTER
PATIENT IS CALLING  FOR TO SCHEDULE A HOSPITAL FOLLOW     PATIENT WOULD LIKE TO BE SEEN TODAY            PLEASE CONTACT PATIENT JOSE GUIDRY  [phone]

## 2020-10-02 ENCOUNTER — OFFICE VISIT (OUTPATIENT)
Dept: FAMILY MEDICINE CLINIC | Facility: CLINIC | Age: 60
End: 2020-10-02

## 2020-10-02 VITALS
OXYGEN SATURATION: 98 % | HEART RATE: 79 BPM | SYSTOLIC BLOOD PRESSURE: 122 MMHG | TEMPERATURE: 96.6 F | BODY MASS INDEX: 28.27 KG/M2 | WEIGHT: 165.6 LBS | DIASTOLIC BLOOD PRESSURE: 68 MMHG | HEIGHT: 64 IN

## 2020-10-02 DIAGNOSIS — K59.03 DRUG-INDUCED CONSTIPATION: ICD-10-CM

## 2020-10-02 DIAGNOSIS — G47.09 OTHER INSOMNIA: ICD-10-CM

## 2020-10-02 DIAGNOSIS — Z79.899 HIGH RISK MEDICATION USE: ICD-10-CM

## 2020-10-02 DIAGNOSIS — E11.9 TYPE 2 DIABETES MELLITUS WITHOUT COMPLICATION, WITHOUT LONG-TERM CURRENT USE OF INSULIN (HCC): Primary | ICD-10-CM

## 2020-10-02 LAB — HBA1C MFR BLD: 7.3 %

## 2020-10-02 PROCEDURE — 99214 OFFICE O/P EST MOD 30 MIN: CPT | Performed by: NURSE PRACTITIONER

## 2020-10-02 PROCEDURE — 83036 HEMOGLOBIN GLYCOSYLATED A1C: CPT | Performed by: NURSE PRACTITIONER

## 2020-10-02 RX ORDER — LUBIPROSTONE 24 UG/1
24 CAPSULE ORAL
Qty: 30 CAPSULE | Refills: 2 | Status: SHIPPED | OUTPATIENT
Start: 2020-10-02 | End: 2021-05-27

## 2020-10-02 RX ORDER — AMITRIPTYLINE HYDROCHLORIDE 50 MG/1
50-100 TABLET, FILM COATED ORAL NIGHTLY PRN
Qty: 60 TABLET | Refills: 2 | Status: SHIPPED | OUTPATIENT
Start: 2020-10-02 | End: 2020-10-15

## 2020-10-02 NOTE — PATIENT INSTRUCTIONS

## 2020-10-05 ENCOUNTER — LAB REQUISITION (OUTPATIENT)
Dept: LAB | Facility: HOSPITAL | Age: 60
End: 2020-10-05

## 2020-10-05 DIAGNOSIS — Z00.00 ENCOUNTER FOR GENERAL ADULT MEDICAL EXAMINATION WITHOUT ABNORMAL FINDINGS: ICD-10-CM

## 2020-10-05 PROCEDURE — U0004 COV-19 TEST NON-CDC HGH THRU: HCPCS | Performed by: INTERNAL MEDICINE

## 2020-10-06 LAB — SARS-COV-2 RNA RESP QL NAA+PROBE: NOT DETECTED

## 2020-10-07 ENCOUNTER — OUTSIDE FACILITY SERVICE (OUTPATIENT)
Dept: GASTROENTEROLOGY | Facility: CLINIC | Age: 60
End: 2020-10-07

## 2020-10-07 PROCEDURE — G0105 COLORECTAL SCRN; HI RISK IND: HCPCS | Performed by: INTERNAL MEDICINE

## 2020-10-08 LAB
AMPHETAMINES UR QL SCN: NEGATIVE NG/ML
BARBITURATES UR QL: NEGATIVE NG/ML
BENZODIAZ UR QL SCN: NEGATIVE NG/ML
BZE UR QL: NEGATIVE NG/ML
CANNABINOIDS UR QL SCN: NEGATIVE NG/ML
CREAT UR-MCNC: 140.9 MG/DL (ref 20–300)
ETHANOL UR-MCNC: NEGATIVE %
MEPERIDINE UR QL: NEGATIVE NG/ML
METHADONE UR QL: NEGATIVE NG/ML
OPIATES UR QL SCN: POSITIVE NG/ML
OXYCODONE+OXYMORPHONE UR QL SCN: POSITIVE
PCP UR QL: NEGATIVE NG/ML
PROPOXYPH UR QL: NEGATIVE NG/ML
TRAMADOL UR QL SCN: NEGATIVE NG/ML

## 2020-10-09 RX ORDER — GLIPIZIDE 10 MG/1
TABLET ORAL
Qty: 180 TABLET | Refills: 0 | OUTPATIENT
Start: 2020-10-09

## 2020-10-15 ENCOUNTER — OFFICE VISIT (OUTPATIENT)
Dept: SLEEP MEDICINE | Facility: HOSPITAL | Age: 60
End: 2020-10-15

## 2020-10-15 VITALS
HEIGHT: 64 IN | BODY MASS INDEX: 27.66 KG/M2 | DIASTOLIC BLOOD PRESSURE: 83 MMHG | SYSTOLIC BLOOD PRESSURE: 135 MMHG | WEIGHT: 162 LBS | HEART RATE: 80 BPM

## 2020-10-15 DIAGNOSIS — G25.81 RESTLESS LEGS SYNDROME (RLS): ICD-10-CM

## 2020-10-15 DIAGNOSIS — G47.00 INSOMNIA, UNSPECIFIED TYPE: ICD-10-CM

## 2020-10-15 DIAGNOSIS — G47.8 NON-RESTORATIVE SLEEP: ICD-10-CM

## 2020-10-15 DIAGNOSIS — R63.8 OTHER SYMPTOMS AND SIGNS CONCERNING FOOD AND FLUID INTAKE: ICD-10-CM

## 2020-10-15 DIAGNOSIS — G47.10 HYPERSOMNIA: Primary | ICD-10-CM

## 2020-10-15 PROCEDURE — G0463 HOSPITAL OUTPT CLINIC VISIT: HCPCS

## 2020-10-15 PROCEDURE — 99213 OFFICE O/P EST LOW 20 MIN: CPT | Performed by: FAMILY MEDICINE

## 2020-10-15 RX ORDER — ROPINIROLE 0.25 MG/1
TABLET, FILM COATED ORAL
Qty: 45 TABLET | Refills: 1 | Status: SHIPPED | OUTPATIENT
Start: 2020-10-15 | End: 2020-11-12

## 2020-10-15 NOTE — PROGRESS NOTES
[facility]Consultation       Reason for Consultation: Insomnia      Patient Care Team:  Gordo Barth APRN as PCP - General (Family Medicine)  Shine Wilks MD as Consulting Physician (Gastroenterology)  Shazia Hardy MD as Consulting Physician (Sleep Medicine)      History of present illness:  Thank you for asking me to see your patient.  The patient is a 60 y.o. female presents over concern for sleep disorder due to issues with insomnia.  Reports that she cannot sleep.  History of a prior sleep study; was told that she was prescribed a sleep aid to help her sleep which was Ambien then amitriptyline.  Reports morning headaches and dry mouth in the morning.  Also reports jerking of her legs at night.  Nocturia 2 times a night.  Also hypersomnia nonrestorative sleep.    Has been on Ambien for quite some time in the past.  Primary care nurse practitioner recently decreased dosage of Ambien and then discontinued due to patient also being on hydrocodone and her age about a month ago.  Discussed with patient that I agree with this action as Ambien is not meant for long-term use.  Has tried trazodone in the past as well. Was put on Elavil for less than a month and this has helped a bit however not entirely.  Patient does endorse urge sensations nightly; movement helps.  Feels like if she can get her legs to relax she could sleep better.  She is on Norco for pain however she only takes it twice a day during the day and not before bed.    Sleep Schedule:  Bed time: 10 PM to 11 PM  Sleep latency: 1 to 2 hours  Wake time: 9 AM to 10 AM  Average hours slept: 6-7  Non-restorative sleep: Yes  Number of naps per day: Maybe 1  Rotating shifts?:  No  Nocturia: 2 times a night  Electronics in bedroom: Y    Excessive daytime sleepiness or drowsiness:Y  Any accidents at work due to sleepiness in the last 5 years:N  Any difficulty driving due to sleepiness or being drowsy: N  Weight changed in the  "last 5 years:Y - LOST 20 LBS    Snoring:N  Witnessed apneas:N  Have you ever awakened gasping for breath, coughing, choking or respiratory discomfort: N  Morning headaches: Y  Awaken with a sore throat or dry mouth: Y    Any reports of leg jerking at night: Y  Urge sensations: Y -worse at night and movement helps  Does pain disrupt sleep: N  Sweating during sleep: N  Teeth grinding: N    Any sudden episodes of sleep during the day: N  Sleep paralysis/hallucinations: N  Muscle weakness with laughing/anger: N  Nightmares: Y  Sleep walking: N    Are you sleepy when you increase your sleep time: N  Do you sleep better away from your own bed: N    ESS: 3    Social History: No tobacco alcohol caffeine or drug use    Review of Systems:    A complete review of systems was done and all were negative with the exception of all negative    Allergies:  Patient has no known allergies.    Family History: AMELIA yes       Current Outpatient Medications:   •  atenolol (TENORMIN) 25 MG tablet, Take 1 tablet by mouth once daily, Disp: 90 tablet, Rfl: 0  •  BREO ELLIPTA 100-25 MCG/INH inhaler, Inhale 1 puff Daily. Rinse mouth after each use, Disp: , Rfl: 11  •  DULoxetine (CYMBALTA) 20 MG capsule, Take 1 capsule by mouth once daily, Disp: 30 capsule, Rfl: 2  •  EPINEPHrine (EPIPEN) 0.3 MG/0.3ML solution auto-injector injection, AS DIRECTED. USE IN CASE OF MEDICAL EMERGENCY, Disp: , Rfl:   •  fluticasone (FLONASE) 50 MCG/ACT nasal spray, 2 sprays into the nostril(s) as directed by provider Daily., Disp: 16 g, Rfl: 11  •  glucose blood test strip, , Disp: , Rfl:   •  HYDROcodone-acetaminophen (NORCO)  MG per tablet, Take 1 tablet by mouth 3 (Three) Times a Day As Needed. for pain, Disp: , Rfl:   •  hydrOXYzine (ATARAX) 50 MG tablet, Take 1-2 tablets by mouth At Night As Needed (sleep)., Disp: 60 tablet, Rfl: 2  •  Insulin Pen Needle (PEN NEEDLES 5/16\") 30G X 8 MM misc, 1 Units As Needed (with trulicity)., Disp: 100 each, Rfl: 0  •  " "levothyroxine (SYNTHROID, LEVOTHROID) 100 MCG tablet, Take 1 tablet by mouth Daily., Disp: 30 tablet, Rfl: 2  •  lubiprostone (Amitiza) 24 MCG capsule, Take 1 capsule by mouth Daily With Breakfast., Disp: 30 capsule, Rfl: 2  •  metFORMIN (GLUCOPHAGE) 500 MG tablet, Take 2 tablets by mouth 2 (Two) Times a Day With Meals., Disp: 120 tablet, Rfl: 2  •  Mirabegron ER (MYRBETRIQ) 50 MG tablet sustained-release 24 hour 24 hr tablet, Take 50 mg by mouth Daily., Disp: 14 tablet, Rfl: 0  •  ondansetron ODT (ZOFRAN-ODT) 4 MG disintegrating tablet, DISSOLVE 1 TABLET IN MOUTH EVERY 8 HOURS AS NEEDED FOR NAUSEA AND VOMITING, Disp: 12 tablet, Rfl: 0  •  pantoprazole (PROTONIX) 40 MG EC tablet, Take 1 tablet by mouth Daily., Disp: 30 tablet, Rfl: 2  •  PROAIR  (90 Base) MCG/ACT inhaler, Inhale See Admin Instructions. Inhale 2 puffs by mouth every 4 to 6 hours as needed, Disp: , Rfl: 3  •  rOPINIRole (Requip) 0.25 MG tablet, Take 1 tab PO before bedtime. After 3d can inc to 2 tabs if needed. After 7d can inc to 3 tabs if needed. After 7d can inc to max 4 tabs if needed., Disp: 45 tablet, Rfl: 1  •  rosuvastatin (CRESTOR) 10 MG tablet, Take 1 tablet by mouth Daily., Disp: 30 tablet, Rfl: 2    Vital Signs:    Vitals:    10/15/20 0900   BP: 135/83   Pulse: 80   Weight: 73.5 kg (162 lb)   Height: 162.6 cm (64\")      Body mass index is 27.81 kg/m².  Neck Circumference: 15 inches      Physical Exam:   General Alert and oriented. No acute distress noted   Pharynx/Throat Class II/III Mallampati airway, large tongue, no evidence of redundant lateral pharyngeal tissue. No oral lesions. No thrush. Moist mucous membranes.   Head Normocephalic. Symmetrical. Atraumatic.    Nose No septal deviation. No drainage   Chest Wall Normal shape. Symmetric expansion with respiration. No tenderness.   Neck Trachea midline, no thyromegaly or adenopathy    Lungs Clear to auscultation bilaterally. No wheezes. No rhonchi. No rales. Respirations regular, " even and unlabored.   Heart Regular rhythm and normal rate. Normal S1 and S2. No murmur   Abdomen Soft, non-tender and non-distended. Normal bowel sounds. No masses.   Extremities Moves all extremities well. No edema   Psychiatric Normal mood and affect.       Impression:  1. Hypersomnia    2. Non-restorative sleep    3. Insomnia, unspecified type    4. Restless legs syndrome (RLS)    5. Other symptoms and signs concerning food and fluid intake         Plan:    Good sleep hygiene measures should be maintained.  Weight loss would be beneficial in this patient who is overweight with BMI 27.8.    I discussed the pathophysiology of obstructive sleep apnea with the patient.  We discussed the adverse outcomes associated with untreated sleep-disordered breathing.  We discussed treatment modalities of obstructive sleep apnea including CPAP device as well as oral mandibular advancement device. Sleep study will be scheduled to establish definitive diagnosis of sleep disorder breathing.  Weight loss will be strongly beneficial in order to reduce the severity of sleep-disordered breathing.  Patient has narrow oropharyngeal structure.  Caution during activities that require prolonged concentration is strongly advised.  Patient will be notified of sleep study results after sleep study is completed.  If sleep apnea is only mild,  oral mandibular advancement device may be one of the treatment options.  However if sleep apnea is moderately severe, CPAP treatment will be strongly encouraged.  The patient is not opposed to treatment with CPAP device if we confirm significant obstructive sleep apnea on polysomnography.     Concern for restless leg syndrome being the cause of her insomnia.  We will follow up home sleep study in case untreated sleep apnea is contributing to insomnia.  Advised to discontinue amitriptyline.  Will prescribe ropinirole.  We will also follow-up ferritin level as this can contribute to restless leg syndrome  symptoms.  Return to clinic in 1 month for follow-up or sooner if needed.  Can consider CBT-I in the future as well if needed.    Thank you for allowing me to participate in your patient's care.    Shazia Hardy MD  Sleep Medicine  10/15/20  10:08 EDT

## 2020-10-23 ENCOUNTER — HOSPITAL ENCOUNTER (OUTPATIENT)
Dept: SLEEP MEDICINE | Facility: HOSPITAL | Age: 60
Discharge: HOME | End: 2020-10-23
Admitting: FAMILY MEDICINE

## 2020-10-23 DIAGNOSIS — G47.10 HYPERSOMNIA: ICD-10-CM

## 2020-10-23 DIAGNOSIS — G47.00 INSOMNIA, UNSPECIFIED TYPE: ICD-10-CM

## 2020-10-23 DIAGNOSIS — G47.8 NON-RESTORATIVE SLEEP: ICD-10-CM

## 2020-10-23 DIAGNOSIS — G25.81 RESTLESS LEGS SYNDROME (RLS): ICD-10-CM

## 2020-10-23 PROCEDURE — 95806 SLEEP STUDY UNATT&RESP EFFT: CPT | Performed by: FAMILY MEDICINE

## 2020-10-23 PROCEDURE — 95806 SLEEP STUDY UNATT&RESP EFFT: CPT

## 2020-10-26 RX ORDER — ATENOLOL 25 MG/1
TABLET ORAL
Qty: 90 TABLET | Refills: 0 | Status: SHIPPED | OUTPATIENT
Start: 2020-10-26 | End: 2020-10-28 | Stop reason: SDUPTHER

## 2020-10-28 ENCOUNTER — OFFICE VISIT (OUTPATIENT)
Dept: FAMILY MEDICINE CLINIC | Facility: CLINIC | Age: 60
End: 2020-10-28

## 2020-10-28 VITALS
BODY MASS INDEX: 29.57 KG/M2 | HEIGHT: 64 IN | WEIGHT: 173.2 LBS | HEART RATE: 109 BPM | DIASTOLIC BLOOD PRESSURE: 78 MMHG | OXYGEN SATURATION: 99 % | SYSTOLIC BLOOD PRESSURE: 122 MMHG | TEMPERATURE: 98 F

## 2020-10-28 DIAGNOSIS — Z12.31 ENCOUNTER FOR SCREENING MAMMOGRAM FOR MALIGNANT NEOPLASM OF BREAST: Primary | ICD-10-CM

## 2020-10-28 DIAGNOSIS — Z12.4 ENCOUNTER FOR SCREENING FOR CERVICAL CANCER: ICD-10-CM

## 2020-10-28 PROCEDURE — G0439 PPPS, SUBSEQ VISIT: HCPCS | Performed by: NURSE PRACTITIONER

## 2020-10-28 RX ORDER — ATENOLOL 25 MG/1
25 TABLET ORAL DAILY
Qty: 90 TABLET | Refills: 0 | Status: SHIPPED | OUTPATIENT
Start: 2020-10-28

## 2020-10-28 RX ORDER — SYRING-NEEDL,DISP,INSUL,0.3 ML 30 GX5/16"
1 SYRINGE, EMPTY DISPOSABLE MISCELLANEOUS AS NEEDED
Qty: 1 EACH | Refills: 0 | Status: SHIPPED | OUTPATIENT
Start: 2020-10-28

## 2020-10-28 RX ORDER — BLOOD-GLUCOSE METER
1 KIT MISCELLANEOUS AS NEEDED
Qty: 1 EACH | Refills: 0 | Status: SHIPPED | OUTPATIENT
Start: 2020-10-28

## 2020-10-28 NOTE — PROGRESS NOTES
The ABCs of the Annual Wellness Visit  Subsequent Medicare Wellness Visit    Chief Complaint   Patient presents with   • Medicare Wellness-subsequent   • Med Refill   • Insomnia       Subjective   History of Present Illness:  Violet Rahman is a 60 y.o. female who presents for a Subsequent Medicare Wellness Visit.    Patient is here today for medicare annual wellness and follow up on medications.       Diabetes-just had follow up 3 weeks ago    INSOMNIA: saw sleep specialist on 10/15-she states it helps legs, but isn't helping her sleep.     She had sleep study on 10/23, but hasn't heard results from this yet.      She states she tried the amitriptyline and requip together and felt like a zombie. She states she closes her eyes, but can't sleep.       HEALTH RISK ASSESSMENT    Recent Hospitalizations:  No hospitalization(s) within the last year.    Current Medical Providers:  Patient Care Team:  Gordo Barth APRN as PCP - General (Family Medicine)  Shine Wilks MD as Consulting Physician (Gastroenterology)    Smoking Status:  Social History     Tobacco Use   Smoking Status Never Smoker   Smokeless Tobacco Never Used       Alcohol Consumption:  Social History     Substance and Sexual Activity   Alcohol Use No       Depression Screen:   PHQ-2/PHQ-9 Depression Screening 10/28/2020   Little interest or pleasure in doing things 0   Feeling down, depressed, or hopeless 0   Trouble falling or staying asleep, or sleeping too much -   Feeling tired or having little energy -   Poor appetite or overeating -   Feeling bad about yourself - or that you are a failure or have let yourself or your family down -   Trouble concentrating on things, such as reading the newspaper or watching television -   Moving or speaking so slowly that other people could have noticed. Or the opposite - being so fidgety or restless that you have been moving around a lot more than usual -   Thoughts that you would be better off dead, or of  hurting yourself in some way -   Total Score 0       Fall Risk Screen:  MANOLO Fall Risk Assessment has not been completed.    Health Habits and Functional and Cognitive Screening:  Functional & Cognitive Status 10/28/2020   Do you have difficulty preparing food and eating? No   Do you have difficulty bathing yourself, getting dressed or grooming yourself? No   Do you have difficulty using the toilet? No   Do you have difficulty moving around from place to place? No   Do you have trouble with steps or getting out of a bed or a chair? No   Current Diet Unhealthy Diet   Dental Exam Up to date   Eye Exam Up to date   Exercise (times per week) 0 times per week   Current Exercise Activities Include None   Do you need help using the phone?  No   Are you deaf or do you have serious difficulty hearing?  No   Do you need help with transportation? No   Do you need help shopping? No   Do you need help preparing meals?  No   Do you need help with housework?  No   Do you need help with laundry? No   Do you need help taking your medications? No   Do you need help managing money? No   Do you ever drive or ride in a car without wearing a seat belt? No   Have you felt unusual stress, anger or loneliness in the last month? No   Who do you live with? Alone   If you need help, do you have trouble finding someone available to you? No   Have you been bothered in the last four weeks by sexual problems? No   Do you have difficulty concentrating, remembering or making decisions? Yes         Does the patient have evidence of cognitive impairment? No    Asprin use counseling:Does not need ASA (and currently is not on it)    Age-appropriate Screening Schedule:  Refer to the list below for future screening recommendations based on patient's age, sex and/or medical conditions. Orders for these recommended tests are listed in the plan section. The patient has been provided with a written plan.    Health Maintenance   Topic Date Due   • ZOSTER  "VACCINE (1 of 2) 01/16/2010   • DIABETIC FOOT EXAM  05/29/2017   • PAP SMEAR  05/29/2017   • URINE MICROALBUMIN  11/30/2017   • MAMMOGRAM  02/09/2020   • INFLUENZA VACCINE  08/01/2020   • DIABETIC EYE EXAM  01/28/2021   • HEMOGLOBIN A1C  04/02/2021   • LIPID PANEL  06/22/2021   • COLONOSCOPY  10/07/2025   • TDAP/TD VACCINES (2 - Td) 06/12/2027          The following portions of the patient's history were reviewed and updated as appropriate: allergies, current medications, past family history, past medical history, past social history, past surgical history and problem list.    Outpatient Medications Prior to Visit   Medication Sig Dispense Refill   • atenolol (TENORMIN) 25 MG tablet Take 1 tablet by mouth once daily 90 tablet 0   • BREO ELLIPTA 100-25 MCG/INH inhaler Inhale 1 puff Daily. Rinse mouth after each use  11   • DULoxetine (CYMBALTA) 20 MG capsule Take 1 capsule by mouth once daily 30 capsule 2   • EPINEPHrine (EPIPEN) 0.3 MG/0.3ML solution auto-injector injection AS DIRECTED. USE IN CASE OF MEDICAL EMERGENCY     • fluticasone (FLONASE) 50 MCG/ACT nasal spray 2 sprays into the nostril(s) as directed by provider Daily. 16 g 11   • glucose blood test strip      • Insulin Pen Needle (PEN NEEDLES 5/16\") 30G X 8 MM misc 1 Units As Needed (with trulicity). 100 each 0   • levothyroxine (SYNTHROID, LEVOTHROID) 100 MCG tablet Take 1 tablet by mouth Daily. 30 tablet 2   • lubiprostone (Amitiza) 24 MCG capsule Take 1 capsule by mouth Daily With Breakfast. 30 capsule 2   • metFORMIN (GLUCOPHAGE) 500 MG tablet Take 2 tablets by mouth 2 (Two) Times a Day With Meals. 120 tablet 2   • Mirabegron ER (MYRBETRIQ) 50 MG tablet sustained-release 24 hour 24 hr tablet Take 50 mg by mouth Daily. 14 tablet 0   • ondansetron ODT (ZOFRAN-ODT) 4 MG disintegrating tablet DISSOLVE 1 TABLET IN MOUTH EVERY 8 HOURS AS NEEDED FOR NAUSEA AND VOMITING 12 tablet 0   • pantoprazole (PROTONIX) 40 MG EC tablet Take 1 tablet by mouth Daily. 30 " tablet 2   • PROAIR  (90 Base) MCG/ACT inhaler Inhale See Admin Instructions. Inhale 2 puffs by mouth every 4 to 6 hours as needed  3   • rOPINIRole (Requip) 0.25 MG tablet Take 1 tab PO before bedtime. After 3d can inc to 2 tabs if needed. After 7d can inc to 3 tabs if needed. After 7d can inc to max 4 tabs if needed. 45 tablet 1   • HYDROcodone-acetaminophen (NORCO)  MG per tablet Take 1 tablet by mouth 3 (Three) Times a Day As Needed. for pain     • rosuvastatin (CRESTOR) 10 MG tablet Take 1 tablet by mouth Daily. 30 tablet 2   • hydrOXYzine (ATARAX) 50 MG tablet Take 1-2 tablets by mouth At Night As Needed (sleep). 60 tablet 2     No facility-administered medications prior to visit.        Patient Active Problem List   Diagnosis   • Constipation   • Gastroesophageal reflux disease   • Family history of colon cancer requiring screening colonoscopy   • Constipation   • Nausea       Advanced Care Planning:  ACP discussion was held with the patient during this visit. Patient does not have an advance directive, information provided.    Review of Systems   Constitutional: Negative for fatigue and fever.   Respiratory: Negative for cough, shortness of breath and wheezing.    Cardiovascular: Negative for chest pain.   Gastrointestinal: Negative for abdominal pain, blood in stool, constipation, diarrhea, nausea and vomiting.   Genitourinary: Negative for dysuria and urgency.   Skin: Negative.    Neurological: Negative for headaches.   Psychiatric/Behavioral: Positive for sleep disturbance. Negative for suicidal ideas. The patient is not nervous/anxious.        Compared to one year ago, the patient feels her physical health is better.  Compared to one year ago, the patient feels her mental health is the same.    Reviewed chart for potential of high risk medication in the elderly: yes  Reviewed chart for potential of harmful drug interactions in the elderly:yes    Objective         Vitals:    10/28/20 1104  "  BP: 122/78   Pulse: 109   Temp: 98 °F (36.7 °C)   SpO2: 99%   Weight: 78.6 kg (173 lb 3.2 oz)   Height: 162.6 cm (64\")       Body mass index is 29.73 kg/m².  Discussed the patient's BMI with her. The BMI is above average; BMI management plan is completed.    Physical Exam  Constitutional:       Appearance: Normal appearance.   Cardiovascular:      Rate and Rhythm: Normal rate and regular rhythm.      Pulses: Normal pulses.   Pulmonary:      Effort: Pulmonary effort is normal.      Breath sounds: Normal breath sounds. No wheezing.   Abdominal:      General: Abdomen is flat. Bowel sounds are normal.   Feet:      Right foot:      Protective Sensation: 10 sites tested. 10 sites sensed.      Skin integrity: Skin integrity normal.      Toenail Condition: Right toenails are normal.      Left foot:      Protective Sensation: 10 sites tested. 10 sites sensed.      Skin integrity: Skin integrity normal.      Toenail Condition: Left toenails are normal.   Skin:     General: Skin is warm and dry.   Neurological:      Mental Status: She is alert and oriented to person, place, and time.   Psychiatric:         Mood and Affect: Mood normal.         Behavior: Behavior normal.         Thought Content: Thought content normal.         Judgment: Judgment normal.         Lab Results   Component Value Date    HGBA1C 7.3 10/02/2020        Assessment/Plan   Medicare Risks and Personalized Health Plan  CMS Preventative Services Quick Reference  Advance Directive Discussion  Breast Cancer/Mammogram Screening  Depression/Dysphoria  Diabetic Lab Screening   Fall Risk  Immunizations Discussed/Encouraged (specific immunizations; Influenza )  Obesity/Overweight      Advanced directive discussion-information given  Mammogram-ordered  Depression-well controlled. Will reach out to sleep specialist for sleep medication  Diabetes-up to date  Fall risk-low risk  Immunization up to date  Obesity-discussed need for weight loss with reduction of calories " and addition of exercise in diet.      The above risks/problems have been discussed with the patient.  Pertinent information has been shared with the patient in the After Visit Summary.  Follow up plans and orders are seen below in the Assessment/Plan Section.    There are no diagnoses linked to this encounter.  Follow Up:  No follow-ups on file.     An After Visit Summary and PPPS were given to the patient.

## 2020-10-28 NOTE — PATIENT INSTRUCTIONS
Medicare Wellness  Personal Prevention Plan of Service     Date of Office Visit:  10/28/2020  Encounter Provider:  PATRIZIA Allen  Place of Service:  [facility]  Patient Name: Violet Rahman  :  1960    As part of the Medicare Wellness portion of your visit today, we are providing you with this personalized preventive plan of services (PPPS). This plan is based upon recommendations of the [place] Preventive Services Task Force (USPSTF) and the Advisory Committee on Immunization Practices (ACIP).    This lists the preventive care services that should be considered, and provides dates of when you are due. Items listed as completed are up-to-date and do not require any further intervention.    Health Maintenance   Topic Date Due   • Pneumococcal Vaccine 0-64 (1 of 1 - PPSV23) 1966   • ZOSTER VACCINE (1 of 2) 2010   • HEPATITIS C SCREENING  2017   • ANNUAL WELLNESS VISIT  2017   • DIABETIC FOOT EXAM  2017   • PAP SMEAR  2017   • URINE MICROALBUMIN  2017   • MAMMOGRAM  2020   • INFLUENZA VACCINE  2020   • DIABETIC EYE EXAM  2021   • HEMOGLOBIN A1C  2021   • LIPID PANEL  2021   • COLONOSCOPY  10/07/2025   • TDAP/TD VACCINES (2 - Td) 2027       Orders Placed This Encounter   Procedures   • Mammo Screening Bilateral With CAD     Standing Status:   Future     Standing Expiration Date:   10/28/2021     Order Specific Question:   Reason for Exam:     Answer:   screening for breast cancer   • Ambulatory Referral to Obstetrics / Gynecology     Referral Priority:   Routine     Referral Type:   Consultation     Referral Reason:   Specialty Services Required     Referred to Provider:   Ivelisse Villaseñor MD     Requested Specialty:   Obstetrics and Gynecology     Number of Visits Requested:   1       Return in about 3 months (around 2021), or if symptoms worsen or fail to improve, for Next scheduled  follow up.

## 2020-10-29 ENCOUNTER — TELEPHONE (OUTPATIENT)
Dept: FAMILY MEDICINE CLINIC | Facility: CLINIC | Age: 60
End: 2020-10-29

## 2020-11-02 DIAGNOSIS — R11.2 NAUSEA AND VOMITING, INTRACTABILITY OF VOMITING NOT SPECIFIED, UNSPECIFIED VOMITING TYPE: ICD-10-CM

## 2020-11-02 RX ORDER — ONDANSETRON 4 MG/1
TABLET, ORALLY DISINTEGRATING ORAL
Qty: 12 TABLET | Refills: 2 | Status: SHIPPED | OUTPATIENT
Start: 2020-11-02 | End: 2020-11-13 | Stop reason: SDUPTHER

## 2020-11-02 RX ORDER — GLIPIZIDE 10 MG/1
TABLET ORAL
Qty: 180 TABLET | Refills: 0 | OUTPATIENT
Start: 2020-11-02

## 2020-11-02 RX ORDER — GLIPIZIDE 10 MG/1
10 TABLET ORAL
Qty: 60 TABLET | Refills: 2 | Status: SHIPPED | OUTPATIENT
Start: 2020-11-02

## 2020-11-02 NOTE — TELEPHONE ENCOUNTER
Pt stated they have been running 240 during the day, pt stated they they are running 400 at night. Pt son took over the phone call, and stated what is it going to take to get her medication corrected.  His wife is a nurse and on the medical board.  Who is the real DR above her. pts son is asking that the next time she sees you she needs to have an interpter because she doesnt fully understand english.  He has asked to come to visits with her and because of Covid he cant.  I explained to pt that she wanted to try the trulicity and stop the metformin.   He stated what is it going to take to get to get it to where he doesn't have to take time away from his family to take mom to hospital.  He stated that she took the metformin and glipizide together for 10 years.  I informed him that the glipizide was DCd in June, and that you stated that she had never been on both metformin and glipizide together.  Please advise

## 2020-11-02 NOTE — TELEPHONE ENCOUNTER
PATIENTS SON IS CALLING IN HE STATES THAT PATIENT HAD A REACTION TO THE TRULICITY AND WAS PUT BACK ON THE METFORMIN AND SHE IS SUPPOSED TO HAVE A PILL THAT SHE TAKES WITH THAT TO HELP DROP THE SUGAR AND SHE DOES NOT HAVE THAT MEDICATION AND THEY ARE WORRIED SHE GOING TO GO INTO DIABETIC COMA.      PLEASE ADVISE    CALLBACK NUMBER IS:  [phone]

## 2020-11-02 NOTE — TELEPHONE ENCOUNTER
The last visit on 10 2 which we updated diabetes medication we discussed try stopping Trulicity and restarting the Metformin that she had not been taking.  I sent refills in office.  Have her sugars been running high?  Has she been taking the Metformin?  She was not on the 2 together prior.  I stopped the Metformin and started the Trulicity per her request but then we stopped that at the 10 to visit.

## 2020-11-02 NOTE — TELEPHONE ENCOUNTER
"Please advise patient that she was well controlled with her diabetes and a A1c of 5.9 previously with her Metformin only when she asked to switch to Trulicity.  I am fine with her going back on the glipizide as well, but I was not aware that was needed because the last time I saw her for this her A1c was only 7.2.  I was unaware that her blood sugars have been 240-400 because that was never told to me at any visit.  I will send in the refill and I would like her to continue the Metformin as well.  The medication changes were per her request.  Please advise them that I do not have a \" doctor over me\" for prescribing as I can practice autonomously.  Also advised him that we do have  services, but as long as I have been seeing Ms. Lazo that she has never requested one.  If they are not happy with my services please let them know that we can send the records to whomever they would like to see.    "

## 2020-11-02 NOTE — TELEPHONE ENCOUNTER
Patient ID: Syd García is a 43 y o  female  Chief Complaint: Hemorrhoids, follow-up for breast    Notes for this visit:    Breast:  -3 lumps found self breast exam last week now resolved, which is reassuring  Likely cysts related to hormones and environmental factors  Reviewed hormone, diet, and stress influence on breast tissue    -Left axilla not concerning at this time   -Patient needs repeat mammogram  Reinforced the importance of this with her  She understands and agrees   -Schedule mammogram      Hemorrhoids:  -Painless bleeding with no history of constipation or straining   -Two colonoscopies in last three years which only show hemorrhoids is reassuring   -Recommend hemorrhoidectomy as this is an obvious source of bleeding  If symptoms persist after hemorrhoidectomy, will then consider further evaluation    -Schedule hemorrhoidectomy  History:    HPI: 43 F presents for evaluation after discovering 3 lumps in inferior right breast last week, now resolved  Also complains of left axilla lump, present for 13 years, growing in size  Denies discharge  Last mammogram June 2017, BIRADS 1  Had breast reduction surgery  Has persisting pain over right incision for last year and a half  Did not go for follow-up mammogram due to fear of pain  Complains of hemorrhoids  Has blood in toilet bowl, after wiping, on underwear  Reports seeing dark clots, similar to when she menstruates, in her stool  Denies constipation or straining regularly  Last colonoscopy 9/2018 showed internal hemorrhoids  Has been advised to try topical treatment in the past with no change  The following portions of the patient's history were reviewed and updated as appropriate: allergies  Review of Systems   Constitutional: Negative for chills and fever  HENT: Negative  Eyes: Negative  Respiratory: Negative  Cardiovascular: Negative  Gastrointestinal: Positive for anal bleeding and blood in stool   Negative for Please help. I looked at the last office note.    abdominal distention, abdominal pain, constipation, diarrhea, nausea, rectal pain and vomiting  Endocrine: Negative  Genitourinary: Negative  Musculoskeletal: Negative  Skin: Negative  Allergic/Immunologic: Negative  Neurological: Negative  Hematological: Negative  Psychiatric/Behavioral: Negative  There is no problem list on file for this patient  Current Outpatient Medications:     albuterol (2 5 mg/3 mL) 0 083 % nebulizer solution, Take 2 5 mg by nebulization every 6 (six) hours as needed for wheezing, Disp: , Rfl:     albuterol (PROVENTIL HFA,VENTOLIN HFA) 90 mcg/act inhaler, Inhale 2 puffs every 6 (six) hours as needed for wheezing, Disp: , Rfl:     Albuterol Sulfate 108 (90 Base) MCG/ACT AEPB, Inhale 180 mcg every 4 (four) hours, Disp: , Rfl:     budesonide-formoterol (SYMBICORT) 160-4 5 mcg/act inhaler, Inhale 2 puffs every evening, Disp: , Rfl:     budesonide-formoterol (SYMBICORT) 160-4 5 mcg/act inhaler, Inhale 2 puffs, Disp: , Rfl:     cyclobenzaprine (FLEXERIL) 10 mg tablet, Take 1 tablet (10 mg total) by mouth 3 (three) times a day as needed for muscle spasms, Disp: 15 tablet, Rfl: 0    dicyclomine (BENTYL) 10 mg capsule, TAKE 1 CAPSULE (10 MG TOTAL) BY MOUTH 3 (THREE) TIMES A DAY AS NEEDED (CRAMPING)  , Disp: , Rfl:     esomeprazole (NexIUM) 40 MG capsule, Take 40 mg by mouth every morning before breakfast, Disp: , Rfl:     ibuprofen (ADVIL) 200 mg tablet, Take by mouth every 6 (six) hours as needed for mild pain, Disp: , Rfl:     guaifenesin-codeine (GUAIFENESIN AC) 100-10 MG/5ML liquid, Take 5 mL by mouth 2 (two) times a day as needed, Disp: , Rfl:     omeprazole (PRILOSEC) 20 mg delayed release capsule, Take 20 mg by mouth, Disp: , Rfl:     omeprazole (PRILOSEC) 20 mg delayed release capsule, Take 20 mg by mouth, Disp: , Rfl:     predniSONE 10 mg tablet, Day 1: take 6; day 2: take 5; day 3: take 4; day 4: take 3; day 5: take 2; Day 6: take 1 with food (Patient not taking: Reported on 2/22/2019), Disp: 21 tablet, Rfl: 0    Social History     Tobacco Use    Smoking status: Never Smoker    Smokeless tobacco: Never Used   Substance Use Topics    Alcohol use: Yes     Comment: social       PHYSICAL EXAM    Vitals:   Vitals:    02/22/19 0938   BP: 122/84   BP Location: Right arm   Patient Position: Sitting   Cuff Size: Standard   Pulse: 88   Resp: 18   Temp: 98 4 °F (36 9 °C)   TempSrc: Tympanic   Weight: 73 kg (161 lb)   Height: 5' 2" (1 575 m)       Physical Exam   Constitutional: She is oriented to person, place, and time  She appears well-developed and well-nourished  No distress  HENT:   Head: Normocephalic and atraumatic  Cardiovascular: Normal rate  Pulmonary/Chest: Effort normal  No respiratory distress  Right breast exhibits no mass  Left breast exhibits no mass  There is breast tenderness (mild)  No breast swelling, discharge or bleeding  Breasts are symmetrical    B/L breast reduction scars  Genitourinary:   Genitourinary Comments: Multiple anal skin tags  External hemorrhoids  Neurological: She is alert and oriented to person, place, and time  Nursing note and vitals reviewed  I have spent 30 minutes face to face with the patient and her family today

## 2020-11-05 RX ORDER — LEVOTHYROXINE SODIUM 100 UG/1
TABLET ORAL
Qty: 90 TABLET | Refills: 0 | Status: SHIPPED | OUTPATIENT
Start: 2020-11-05 | End: 2021-05-27

## 2020-11-12 ENCOUNTER — OFFICE VISIT (OUTPATIENT)
Dept: SLEEP MEDICINE | Facility: HOSPITAL | Age: 60
End: 2020-11-12

## 2020-11-12 ENCOUNTER — LAB (OUTPATIENT)
Dept: LAB | Facility: HOSPITAL | Age: 60
End: 2020-11-12

## 2020-11-12 VITALS
BODY MASS INDEX: 29.37 KG/M2 | DIASTOLIC BLOOD PRESSURE: 84 MMHG | HEIGHT: 64 IN | HEART RATE: 74 BPM | WEIGHT: 172 LBS | SYSTOLIC BLOOD PRESSURE: 135 MMHG

## 2020-11-12 DIAGNOSIS — G25.81 RESTLESS LEGS SYNDROME (RLS): ICD-10-CM

## 2020-11-12 DIAGNOSIS — G47.10 HYPERSOMNIA: ICD-10-CM

## 2020-11-12 DIAGNOSIS — G47.8 NON-RESTORATIVE SLEEP: ICD-10-CM

## 2020-11-12 DIAGNOSIS — G47.00 INSOMNIA, UNSPECIFIED TYPE: Primary | ICD-10-CM

## 2020-11-12 DIAGNOSIS — Z79.891 CHRONIC PRESCRIPTION OPIATE USE: ICD-10-CM

## 2020-11-12 LAB — FERRITIN SERPL-MCNC: 35.6 NG/ML (ref 13–150)

## 2020-11-12 PROCEDURE — 36415 COLL VENOUS BLD VENIPUNCTURE: CPT | Performed by: FAMILY MEDICINE

## 2020-11-12 PROCEDURE — 82728 ASSAY OF FERRITIN: CPT | Performed by: FAMILY MEDICINE

## 2020-11-12 PROCEDURE — 99213 OFFICE O/P EST LOW 20 MIN: CPT | Performed by: FAMILY MEDICINE

## 2020-11-12 PROCEDURE — G0463 HOSPITAL OUTPT CLINIC VISIT: HCPCS

## 2020-11-12 RX ORDER — ZOLPIDEM TARTRATE 10 MG/1
5 TABLET ORAL NIGHTLY PRN
Qty: 30 TABLET | Refills: 0 | Status: SHIPPED | OUTPATIENT
Start: 2020-11-12 | End: 2020-12-08

## 2020-11-12 NOTE — PROGRESS NOTES
[facility] Note     Chief Complaint:  AMELIA     Primary Care Physician: Olivia Baldwin PA    Violet Rahman is a 60 y.o.female  was last seen at [facility] sleep lab: 10/23/2020 for home sleep study which showed overall AHI of 3.3.  However overall AHI in supine position was 6.4.  Her lowest oxygen saturation was 87%.  Advised patient to return to clinic to discuss results; she did snore 64.7% of the time.  She also endorsed restless leg symptoms.  At last visit we advised her to discontinue amitriptyline and try ropinirole.  Per records from her primary care nurse practitioner looks like patient took amitriptyline and ropinirole together despite being advised not to.  Stated to primary care nurse practitioner that it made her feel like a zombie.  Patient reports that she has since stopped amitriptyline.  Felt like ropinirole would help with her restless leg sensation but did not help with insomnia.  Would feel very tired during the day.  Feels like she is dragging since not being on Ambien anymore.  Of note she does take Norco 3 times a day chronically for chronic pain.  Discussed that this could be contributing to central sleep apnea.  Home sleep studies can sometimes underestimate severity of sleep apnea.          Current Medications:    Current Outpatient Medications:   •  atenolol (TENORMIN) 25 MG tablet, Take 1 tablet by mouth Daily., Disp: 90 tablet, Rfl: 0  •  BREO ELLIPTA 100-25 MCG/INH inhaler, Inhale 1 puff Daily. Rinse mouth after each use, Disp: , Rfl: 11  •  EPINEPHrine (EPIPEN) 0.3 MG/0.3ML solution auto-injector injection, AS DIRECTED. USE IN CASE OF MEDICAL EMERGENCY, Disp: , Rfl:   •  Euthyrox 100 MCG tablet, Take 1 tablet by mouth once daily, Disp: 90 tablet, Rfl: 0  •  fluticasone (FLONASE) 50 MCG/ACT nasal spray, 2 sprays into the nostril(s) as directed by provider Daily., Disp: 16 g, Rfl: 11  •  glipizide (Glucotrol) 10 MG tablet, Take 1 tablet by mouth 2 (Two) Times a Day Before  "Meals., Disp: 60 tablet, Rfl: 2  •  glucose blood test strip, Use 1 unit as needed to test blood glucose., Disp: 100 each, Rfl: 11  •  glucose monitor monitoring kit, 1 each As Needed (to test sugars)., Disp: 1 each, Rfl: 0  •  HYDROcodone-acetaminophen (NORCO)  MG per tablet, Take 1 tablet by mouth 3 (Three) Times a Day As Needed. for pain, Disp: , Rfl:   •  Insulin Pen Needle (PEN NEEDLES 5/16\") 30G X 8 MM misc, 1 Units As Needed (with trulicity)., Disp: 100 each, Rfl: 0  •  Lancet Device misc, 1 Units As Needed (to test blood glucose)., Disp: 1 each, Rfl: 0  •  Lancets 30G misc, 1 Units As Needed (to test blood sugar)., Disp: 100 each, Rfl: 11  •  lubiprostone (Amitiza) 24 MCG capsule, Take 1 capsule by mouth Daily With Breakfast., Disp: 30 capsule, Rfl: 2  •  metFORMIN (GLUCOPHAGE) 500 MG tablet, Take 2 tablets by mouth 2 (Two) Times a Day With Meals., Disp: 120 tablet, Rfl: 2  •  Mirabegron ER (MYRBETRIQ) 50 MG tablet sustained-release 24 hour 24 hr tablet, Take 50 mg by mouth Daily., Disp: 14 tablet, Rfl: 0  •  ondansetron ODT (ZOFRAN-ODT) 4 MG disintegrating tablet, DISSOLVE 1 TABLET IN MOUTH EVERY 8 HOURS AS NEEDED FOR NAUSEA AND VOMITING, Disp: 12 tablet, Rfl: 2  •  pantoprazole (PROTONIX) 40 MG EC tablet, Take 1 tablet by mouth Daily., Disp: 30 tablet, Rfl: 2  •  PROAIR  (90 Base) MCG/ACT inhaler, Inhale See Admin Instructions. Inhale 2 puffs by mouth every 4 to 6 hours as needed, Disp: , Rfl: 3  •  rosuvastatin (CRESTOR) 10 MG tablet, Take 1 tablet by mouth Daily., Disp: 30 tablet, Rfl: 2  •  zolpidem (AMBIEN) 10 MG tablet, Take 0.5 tablets by mouth At Night As Needed for Sleep., Disp: 30 tablet, Rfl: 0   also entered in Sleep Questionnaire    Patient  has a past medical history of Anxiety, Depression, Diabetes (CMS/HCC), Hypertension, Hypothyroidism, OA (osteoarthritis), OAB (overactive bladder), AMELIA (obstructive sleep apnea), and Stomach ulcer.    Social History:    Social History " "    Socioeconomic History   • Marital status: Single     Spouse name: Not on file   • Number of children: Not on file   • Years of education: Not on file   • Highest education level: Not on file   Tobacco Use   • Smoking status: Never Smoker   • Smokeless tobacco: Never Used   Substance and Sexual Activity   • Alcohol use: No   • Drug use: No   • Sexual activity: Defer       Allergies:  Patient has no known allergies.    Review of Systems:    A complete review of systems was done and all were negative with the exception of insomnia    Vital Signs:    Vitals:    11/12/20 0900   BP: 135/84   Pulse: 74   Weight: 78 kg (172 lb)   Height: 162.6 cm (64\")     Body mass index is 29.52 kg/m².    Vital Signs /84   Pulse 74   Ht 162.6 cm (64\")   Wt 78 kg (172 lb)   BMI 29.52 kg/m²  Body mass index is 29.52 kg/m².    General Alert and oriented. No acute distress noted   Pharynx/Throat Class II/III Mallampati airway, large tongue, no evidence of redundant lateral pharyngeal tissue. No oral lesions. No thrush. Moist mucous membranes.   Head Normocephalic. Symmetrical. Atraumatic.    Nose No septal deviation. No drainage   Chest Wall Normal shape. Symmetric expansion with respiration. No tenderness.   Neck Trachea midline, no thyromegaly or adenopathy    Lungs Clear to auscultation bilaterally. No wheezes. No rhonchi. No rales. Respirations regular, even and unlabored.   Heart Regular rhythm and normal rate. Normal S1 and S2. No murmur   Abdomen Soft, non-tender and non-distended. Normal bowel sounds. No masses.   Extremities Moves all extremities well. No edema   Psychiatric Normal mood and affect.     Impression:  1. Insomnia, unspecified type    2. Restless legs syndrome (RLS)    3. Non-restorative sleep    4. Hypersomnia    5. Chronic prescription opiate use      Will order in lab split study to further assess for sleep apnea specifically central sleep apnea given chronic opiate use.    Patient is very tired and " barely getting any sleep due to her insomnia.  Having difficulty functioning.  Will prescribe her Ambien for 30 days only.  Discussed with patient that I do not prescribe this medication chronically as it is not safe for her to take chronically and she could also have increased tolerance to it with time.  In the meantime I will also be referring her to Dr. Kofi Paez for CBT-I given that her insomnia could be primary insomnia and he may be able to help with that.  She did she did not get her ferritin level checked at last visit however will get this done today.  Low ferritin levels can be also contributing to restless leg syndrome.    Of note I did discuss different options for treatment for restless leg syndrome.  I suggested gabapentin which patient refused; reports that she had taken it before for neuropathic pain however did not help.  I tried explaining that gabapentin is being used off label in this case for restless leg syndrome however she still refused.  Of note she is on Norco chronically.  Norco is a medication which is sometimes used for restless leg syndrome.  However it is still not helping her symptoms.  This might mean that her restless leg syndrome symptoms could be secondary to low ferritin or untreated sleep apnea.  Therefore it is very important that we get the ferritin level checked and do an in lab study for further assessment.    Shazia Hardy MD  Sleep Medicine  11/12/20  11:21 EST

## 2020-11-13 ENCOUNTER — OFFICE VISIT (OUTPATIENT)
Dept: GASTROENTEROLOGY | Facility: CLINIC | Age: 60
End: 2020-11-13

## 2020-11-13 VITALS
SYSTOLIC BLOOD PRESSURE: 120 MMHG | WEIGHT: 174.5 LBS | DIASTOLIC BLOOD PRESSURE: 80 MMHG | HEART RATE: 80 BPM | HEIGHT: 64 IN | OXYGEN SATURATION: 97 % | BODY MASS INDEX: 29.79 KG/M2 | TEMPERATURE: 97 F

## 2020-11-13 DIAGNOSIS — R13.10 DYSPHAGIA, UNSPECIFIED TYPE: ICD-10-CM

## 2020-11-13 DIAGNOSIS — R11.2 NAUSEA AND VOMITING, INTRACTABILITY OF VOMITING NOT SPECIFIED, UNSPECIFIED VOMITING TYPE: ICD-10-CM

## 2020-11-13 DIAGNOSIS — K22.2 SCHATZKI'S RING: ICD-10-CM

## 2020-11-13 DIAGNOSIS — R10.13 EPIGASTRIC PAIN: ICD-10-CM

## 2020-11-13 DIAGNOSIS — K44.9 HIATAL HERNIA: ICD-10-CM

## 2020-11-13 DIAGNOSIS — Z80.0 FAMILY HISTORY OF COLON CANCER: ICD-10-CM

## 2020-11-13 DIAGNOSIS — R68.81 EARLY SATIETY: ICD-10-CM

## 2020-11-13 DIAGNOSIS — K21.9 GASTROESOPHAGEAL REFLUX DISEASE WITHOUT ESOPHAGITIS: Primary | ICD-10-CM

## 2020-11-13 DIAGNOSIS — E11.9 TYPE 2 DIABETES MELLITUS WITHOUT COMPLICATION, WITHOUT LONG-TERM CURRENT USE OF INSULIN (HCC): ICD-10-CM

## 2020-11-13 DIAGNOSIS — K64.9 HEMORRHOIDS, UNSPECIFIED HEMORRHOID TYPE: ICD-10-CM

## 2020-11-13 PROCEDURE — 99214 OFFICE O/P EST MOD 30 MIN: CPT | Performed by: NURSE PRACTITIONER

## 2020-11-13 RX ORDER — PANTOPRAZOLE SODIUM 40 MG/1
40 TABLET, DELAYED RELEASE ORAL DAILY
Qty: 30 TABLET | Refills: 5 | Status: SHIPPED | OUTPATIENT
Start: 2020-11-13 | End: 2021-05-12

## 2020-11-13 RX ORDER — ONDANSETRON 4 MG/1
4 TABLET, ORALLY DISINTEGRATING ORAL EVERY 8 HOURS PRN
Qty: 60 TABLET | Refills: 2 | Status: SHIPPED | OUTPATIENT
Start: 2020-11-13 | End: 2021-05-27

## 2020-11-13 NOTE — PROGRESS NOTES
Vomiting and Follow-up (post EGD/CLS)      HPI  60-year-old female presents the office today for follow-up.  An  has accompanied her today in her office visit to facilitate communication by the name of Radha Tinsley.  She was last seen in office on 9/22/2020.  She has a history of esophagitis and gastritis, GERD, and constipation.    She underwent EGD and colonoscopy on 10/7/2020.  EGD demonstrated a 1 cm hiatal hernia and a mild Schatzki's ring GE junction which was traversed and dilated with a balloon dilator to 20 mm.  No specimens were collected.  Colonoscopy revealed mild nonbleeding internal hemorrhoids, otherwise was unremarkable.  No colon specimens were collected.    She continues to report epigastric pain. Symptoms do not occur daily, but more days than not per week. She is not currently taking any type of acid suppressive medication. She describes the pain as a burning and discomfort in her epigastric area. Spicy foods and chips seem to worsen symptoms. Dysphagia has improved since her esophageal dilation. She reports frequent nausea and vomiting. At times the discomfort and nausea/vomiting are so severe she is unable to eat. She does report early satiety. She is diabetic.     She reports having regular BMs, but not daily. Stool is soft. She take Mylanta daily. She is not currently taking Amitiza. She reports a family history of colon cancer in her sister. She reports some mild weight gain. She reports a history of hemorrhoids, but states that they are not giving her difficulty at the moment.     Review of Systems   Constitutional: Positive for diaphoresis and fatigue. Negative for appetite change, chills, fever and unexpected weight change.   HENT: Positive for ear pain and sore throat. Negative for dental problem, mouth sores, rhinorrhea and voice change.    Eyes: Negative for pain, redness and visual disturbance.   Respiratory: Negative for cough, chest tightness and wheezing.     Cardiovascular: Negative for chest pain, palpitations and leg swelling.   Endocrine: Negative for cold intolerance, heat intolerance, polydipsia, polyphagia and polyuria.   Genitourinary: Positive for dysuria. Negative for frequency, hematuria and urgency.   Musculoskeletal: Negative for arthralgias, back pain, joint swelling, myalgias and neck pain.   Skin: Negative for rash.   Allergic/Immunologic: Negative for environmental allergies, food allergies and immunocompromised state.   Neurological: Positive for headaches. Negative for dizziness, seizures, weakness and numbness.   Hematological: Does not bruise/bleed easily.   Psychiatric/Behavioral: Negative for sleep disturbance. The patient is not nervous/anxious.           Problem List:    Patient Active Problem List   Diagnosis   • Constipation   • Gastroesophageal reflux disease   • Family history of colon cancer requiring screening colonoscopy   • Constipation   • Nausea       Medical History:    Past Medical History:   Diagnosis Date   • Anxiety    • Depression    • Diabetes (CMS/HCC)    • Hypertension    • Hypothyroidism    • OA (osteoarthritis)    • OAB (overactive bladder)    • AMELIA (obstructive sleep apnea)    • Stomach ulcer         Social History:    Social History     Socioeconomic History   • Marital status: Single     Spouse name: Not on file   • Number of children: Not on file   • Years of education: Not on file   • Highest education level: Not on file   Tobacco Use   • Smoking status: Never Smoker   • Smokeless tobacco: Never Used   Substance and Sexual Activity   • Alcohol use: No   • Drug use: No   • Sexual activity: Defer       Family History:   Family History   Problem Relation Age of Onset   • Hypertension Mother    • Stroke Mother    • Diabetes Mother    • Lung cancer Father    • Diabetes Sister    • Colon cancer Sister        Surgical History:   Past Surgical History:   Procedure Laterality Date   •  SECTION      X2   • COLONOSCOPY  "N/A 12/27/2016    Procedure: COLONOSCOPY;  Surgeon: Raiza Talbot MD;  Location: [facility] OR;  Service:    • ENDOSCOPY N/A 12/27/2016    Procedure: ESOPHAGOGASTRODUODENOSCOPY with biopsies  and link test;  Surgeon: Raiza Talbot MD;  Location: [facility] OR;  Service:    • KNEE SURGERY Right    • LAPAROSCOPIC CHOLECYSTECTOMY     • UPPER GASTROINTESTINAL ENDOSCOPY  2020 [place]         Current Outpatient Medications:   •  atenolol (TENORMIN) 25 MG tablet, Take 1 tablet by mouth Daily., Disp: 90 tablet, Rfl: 0  •  BREO ELLIPTA 100-25 MCG/INH inhaler, Inhale 1 puff Daily. Rinse mouth after each use, Disp: , Rfl: 11  •  EPINEPHrine (EPIPEN) 0.3 MG/0.3ML solution auto-injector injection, AS DIRECTED. USE IN CASE OF MEDICAL EMERGENCY, Disp: , Rfl:   •  Euthyrox 100 MCG tablet, Take 1 tablet by mouth once daily, Disp: 90 tablet, Rfl: 0  •  fluticasone (FLONASE) 50 MCG/ACT nasal spray, 2 sprays into the nostril(s) as directed by provider Daily., Disp: 16 g, Rfl: 11  •  glipizide (Glucotrol) 10 MG tablet, Take 1 tablet by mouth 2 (Two) Times a Day Before Meals., Disp: 60 tablet, Rfl: 2  •  glucose blood test strip, Use 1 unit as needed to test blood glucose., Disp: 100 each, Rfl: 11  •  glucose monitor monitoring kit, 1 each As Needed (to test sugars)., Disp: 1 each, Rfl: 0  •  HYDROcodone-acetaminophen (NORCO)  MG per tablet, Take 1 tablet by mouth 3 (Three) Times a Day As Needed. for pain, Disp: , Rfl:   •  Insulin Pen Needle (PEN NEEDLES 5/16\") 30G X 8 MM misc, 1 Units As Needed (with trulicity)., Disp: 100 each, Rfl: 0  •  Lancet Device misc, 1 Units As Needed (to test blood glucose)., Disp: 1 each, Rfl: 0  •  Lancets 30G misc, 1 Units As Needed (to test blood sugar)., Disp: 100 each, Rfl: 11  •  lubiprostone (Amitiza) 24 MCG capsule, Take 1 capsule by mouth Daily With Breakfast., Disp: 30 capsule, Rfl: 2  •  metFORMIN (GLUCOPHAGE) 500 MG tablet, Take 2 tablets by mouth 2 (Two) Times a Day With Meals., Disp: 120 " tablet, Rfl: 2  •  Mirabegron ER (MYRBETRIQ) 50 MG tablet sustained-release 24 hour 24 hr tablet, Take 50 mg by mouth Daily., Disp: 14 tablet, Rfl: 0  •  ondansetron ODT (ZOFRAN-ODT) 4 MG disintegrating tablet, Place 1 tablet on the tongue Every 8 (Eight) Hours As Needed for Nausea or Vomiting., Disp: 60 tablet, Rfl: 2  •  pantoprazole (PROTONIX) 40 MG EC tablet, Take 1 tablet by mouth Daily for 180 days., Disp: 30 tablet, Rfl: 5  •  PROAIR  (90 Base) MCG/ACT inhaler, Inhale See Admin Instructions. Inhale 2 puffs by mouth every 4 to 6 hours as needed, Disp: , Rfl: 3  •  rosuvastatin (CRESTOR) 10 MG tablet, Take 1 tablet by mouth Daily., Disp: 30 tablet, Rfl: 2  •  zolpidem (AMBIEN) 10 MG tablet, Take 0.5 tablets by mouth At Night As Needed for Sleep., Disp: 30 tablet, Rfl: 0    Allergies: No Known Allergies     The following portions of the patient's history were reviewed and updated as appropriate: allergies, current medications, past family history, past medical history, past social history, past surgical history and problem list.    Vitals:    11/13/20 0833   BP: 120/80   Pulse: 80   Temp: 97 °F (36.1 °C)   SpO2: 97%       Physical Exam  Vitals signs reviewed.   Constitutional:       Appearance: Normal appearance.   Pulmonary:      Effort: Pulmonary effort is normal. No respiratory distress.   Abdominal:      General: Abdomen is flat. Bowel sounds are normal. There is no distension.      Palpations: Abdomen is soft.      Tenderness: There is abdominal tenderness. There is no guarding or rebound.      Comments: Mild epigastric pain noted on palpation   Musculoskeletal: Normal range of motion.   Skin:     General: Skin is warm and dry.   Neurological:      General: No focal deficit present.      Mental Status: She is alert and oriented to person, place, and time.   Psychiatric:         Mood and Affect: Mood normal.         Behavior: Behavior normal.         Thought Content: Thought content normal.          Judgment: Judgment normal.         Assessment/ Plan  Diagnoses and all orders for this visit:    1. Gastroesophageal reflux disease without esophagitis (Primary)    2. Nausea and vomiting, intractability of vomiting not specified, unspecified vomiting type  -     ondansetron ODT (ZOFRAN-ODT) 4 MG disintegrating tablet; Place 1 tablet on the tongue Every 8 (Eight) Hours As Needed for Nausea or Vomiting.  Dispense: 60 tablet; Refill: 2  -     NM Gastric Emptying; Future    3. Early satiety  -     NM Gastric Emptying; Future    4. Epigastric pain  -     NM Gastric Emptying; Future    5. Hiatal hernia    6. Dysphagia, unspecified type    7. Schatzki's ring    8. Hemorrhoids, unspecified hemorrhoid type    9. Family history of colon cancer    10. Type 2 diabetes mellitus without complication, without long-term current use of insulin (CMS/[facility])  -     NM Gastric Emptying; Future    Other orders  -     pantoprazole (PROTONIX) 40 MG EC tablet; Take 1 tablet by mouth Daily for 180 days.  Dispense: 30 tablet; Refill: 5         Return in about 2 weeks (around 11/27/2020) for to discuss gastric emptying study results and reassess symptoms.  1.  For epigastric pain, hiatal hernia, and GERD, we will have you restart pantoprazole 40 mg once daily.  Please take this medication 1 hour before your first meal of the day.  A new prescription has been sent to your pharmacy.    2.  For GERD, we recommend avoiding eating 3-4 hours before bedtime, eating smaller more frequent meals, and avoiding any known food triggers including spicy foods, tomatoes and tomato-based sauces, chocolate, coffee/tea, citrus fruits, carbonated  beverages and alcohol.     3.  Due to reports of early satiety and nausea and vomiting, we will schedule a gastric emptying study.    4.  We recommend eating smaller more frequent meals.    5.  For nausea and vomiting, a prescription for Zofran (ondansetron) has been sent to your pharmacy.  You may take 1 tablet and let  dissolve under the tongue every 8 hours as needed for symptom management.  Please note that higher doses of this medication can contribute to constipation.    6.  For constipation we recommend use of MiraLAX.  We would recommend that she start with 1 capful daily and may titrate your dose accordingly based upon bowel habits.  You may take up to 1 capful twice daily if needed.    7.  Due to your family history of colon cancer in your sister, we will place you in recall for your next screening colonoscopy in 5 years, which will be due on 9/22/2025.    8.  Schedule office follow-up with nurse practitioner after gastric emptying study with the presence of a  to discuss results and reassess symptoms.      Discussion:  EGD and colonoscopy findings and pathology reviewed with patient today during her office visit.  Patient continues to report epigastric pain but is not taking pantoprazole.  We will resume this at 40 mg once daily.  For ongoing problems with nausea and vomiting in the setting of diabetes, I suspect that she could have some element of gastroparesis at play for her symptoms.  We will schedule a 4-hour gastric emptying study and have provided her with a prescription for Zofran to assist in the management of nausea and vomiting.  Patient was also recommended to eat smaller more frequent meals.    For constipation, patient was recommended to start MiraLAX and titrate accordingly to help produce regular bowel movements.  Due to the patient's family history of colon cancer in her sister, she has been placed in recall for her next colonoscopy in 5 years, which will be due on 9/22/2025.    Plan for office follow-up after gastric emptying study with an  to discuss results and reassess symptoms.  Patient verbalized understanding of above.  All questions answered and support provided.

## 2020-11-13 NOTE — PATIENT INSTRUCTIONS
1.  For epigastric pain, hiatal hernia, and GERD, we will have you restart pantoprazole 40 mg once daily.  Please take this medication 1 hour before your first meal of the day.  A new prescription has been sent to your pharmacy.    2.  For GERD, we recommend avoiding eating 3-4 hours before bedtime, eating smaller more frequent meals, and avoiding any known food triggers including spicy foods, tomatoes and tomato-based sauces, chocolate, coffee/tea, citrus fruits, carbonated  beverages and alcohol.     3.  Due to reports of early satiety and nausea and vomiting, we will schedule a gastric emptying study.    4.  We recommend eating smaller more frequent meals.    5.  For nausea and vomiting, a prescription for Zofran (ondansetron) has been sent to your pharmacy.  You may take 1 tablet and let dissolve under the tongue every 8 hours as needed for symptom management.  Please note that higher doses of this medication can contribute to constipation.    6.  For constipation we recommend use of MiraLAX.  We would recommend that she start with 1 capful daily and may titrate your dose accordingly based upon bowel habits.  You may take up to 1 capful twice daily if needed.    7.  Due to your family history of colon cancer in your sister, we will place you in recall for your next screening colonoscopy in 5 years, which will be due on 9/22/2025.    8.  Schedule office follow-up with nurse practitioner after gastric emptying study with the presence of a  to discuss results and reassess symptoms.

## 2020-11-17 ENCOUNTER — TRANSCRIBE ORDERS (OUTPATIENT)
Dept: SLEEP MEDICINE | Facility: HOSPITAL | Age: 60
End: 2020-11-17

## 2020-11-17 ENCOUNTER — TRANSCRIBE ORDERS (OUTPATIENT)
Dept: OBSTETRICS AND GYNECOLOGY | Facility: CLINIC | Age: 60
End: 2020-11-17

## 2020-11-17 DIAGNOSIS — Z01.818 OTHER SPECIFIED PRE-OPERATIVE EXAMINATION: Primary | ICD-10-CM

## 2020-11-20 ENCOUNTER — TELEPHONE (OUTPATIENT)
Dept: SLEEP MEDICINE | Facility: HOSPITAL | Age: 60
End: 2020-11-20

## 2020-11-21 ENCOUNTER — LAB (OUTPATIENT)
Dept: LAB | Facility: HOSPITAL | Age: 60
End: 2020-11-21

## 2020-11-21 DIAGNOSIS — Z01.818 OTHER SPECIFIED PRE-OPERATIVE EXAMINATION: ICD-10-CM

## 2020-11-21 PROCEDURE — C9803 HOPD COVID-19 SPEC COLLECT: HCPCS

## 2020-11-21 PROCEDURE — U0004 COV-19 TEST NON-CDC HGH THRU: HCPCS | Performed by: OBSTETRICS & GYNECOLOGY

## 2020-11-23 ENCOUNTER — APPOINTMENT (OUTPATIENT)
Dept: NUCLEAR MEDICINE | Facility: HOSPITAL | Age: 60
End: 2020-11-23

## 2020-11-23 LAB — SARS-COV-2 RNA RESP QL NAA+PROBE: NOT DETECTED

## 2020-11-24 ENCOUNTER — HOSPITAL ENCOUNTER (OUTPATIENT)
Dept: SLEEP MEDICINE | Facility: HOSPITAL | Age: 60
Discharge: HOME | End: 2020-11-24
Admitting: FAMILY MEDICINE

## 2020-11-24 DIAGNOSIS — Z79.891 CHRONIC PRESCRIPTION OPIATE USE: ICD-10-CM

## 2020-11-24 DIAGNOSIS — G25.81 RESTLESS LEGS SYNDROME (RLS): ICD-10-CM

## 2020-11-24 DIAGNOSIS — G47.00 INSOMNIA, UNSPECIFIED TYPE: ICD-10-CM

## 2020-11-24 DIAGNOSIS — G47.8 NON-RESTORATIVE SLEEP: ICD-10-CM

## 2020-11-24 DIAGNOSIS — G47.10 HYPERSOMNIA: ICD-10-CM

## 2020-11-24 PROCEDURE — 95810 POLYSOM 6/> YRS 4/> PARAM: CPT | Performed by: FAMILY MEDICINE

## 2020-11-24 PROCEDURE — 95810 POLYSOM 6/> YRS 4/> PARAM: CPT

## 2020-12-03 ENCOUNTER — TELEPHONE (OUTPATIENT)
Dept: SLEEP MEDICINE | Facility: HOSPITAL | Age: 60
End: 2020-12-03

## 2020-12-03 NOTE — TELEPHONE ENCOUNTER
Patient speaks Indonesian,called interpretor and gave patient results. She stated she understood. I also called Dr. Paez office and spoke to Cece giving her a heads up that patient stated she had not heard from them and that she speaks Faroese Cece said that she would make sure she sends her a letter in her language. Also told patient to call us after she schedules first appointment with Dr. Paez to schedule a f/u w/Dr Hardy. Dr Hardy wants to see patient about 2 months after she gets started w/Dr. Paez-AK

## 2020-12-08 DIAGNOSIS — Z79.891 CHRONIC PRESCRIPTION OPIATE USE: ICD-10-CM

## 2020-12-08 DIAGNOSIS — G25.81 RESTLESS LEGS SYNDROME (RLS): ICD-10-CM

## 2020-12-08 DIAGNOSIS — G47.10 HYPERSOMNIA: ICD-10-CM

## 2020-12-08 DIAGNOSIS — G47.00 INSOMNIA, UNSPECIFIED TYPE: ICD-10-CM

## 2020-12-08 DIAGNOSIS — G47.8 NON-RESTORATIVE SLEEP: ICD-10-CM

## 2020-12-08 RX ORDER — ZOLPIDEM TARTRATE 10 MG/1
TABLET ORAL
Qty: 30 TABLET | Refills: 0 | Status: SHIPPED | OUTPATIENT
Start: 2020-12-08 | End: 2021-02-18

## 2020-12-21 RX ORDER — MIRABEGRON 50 MG/1
TABLET, FILM COATED, EXTENDED RELEASE ORAL
Qty: 30 TABLET | Refills: 2 | Status: SHIPPED | OUTPATIENT
Start: 2020-12-21 | End: 2021-05-27

## 2021-01-04 ENCOUNTER — TELEPHONE (OUTPATIENT)
Dept: FAMILY MEDICINE CLINIC | Facility: CLINIC | Age: 61
End: 2021-01-04

## 2021-01-04 ENCOUNTER — OFFICE VISIT (OUTPATIENT)
Dept: GASTROENTEROLOGY | Facility: CLINIC | Age: 61
End: 2021-01-04

## 2021-01-04 VITALS
DIASTOLIC BLOOD PRESSURE: 80 MMHG | BODY MASS INDEX: 30.13 KG/M2 | SYSTOLIC BLOOD PRESSURE: 130 MMHG | WEIGHT: 176.5 LBS | HEART RATE: 68 BPM | HEIGHT: 64 IN | TEMPERATURE: 97.8 F | OXYGEN SATURATION: 98 %

## 2021-01-04 DIAGNOSIS — R10.13 EPIGASTRIC PAIN: ICD-10-CM

## 2021-01-04 DIAGNOSIS — K21.9 GASTROESOPHAGEAL REFLUX DISEASE, UNSPECIFIED WHETHER ESOPHAGITIS PRESENT: Primary | ICD-10-CM

## 2021-01-04 DIAGNOSIS — K22.2 SCHATZKI'S RING: ICD-10-CM

## 2021-01-04 DIAGNOSIS — Z12.11 COLON CANCER SCREENING: ICD-10-CM

## 2021-01-04 PROCEDURE — 99214 OFFICE O/P EST MOD 30 MIN: CPT | Performed by: NURSE PRACTITIONER

## 2021-01-04 RX ORDER — LEVOTHYROXINE SODIUM 112 UG/1
125 TABLET ORAL DAILY
COMMUNITY
Start: 2020-12-14 | End: 2021-05-27

## 2021-01-04 RX ORDER — ERGOCALCIFEROL 1.25 MG/1
50000 CAPSULE ORAL
COMMUNITY
Start: 2020-12-07 | End: 2021-01-06

## 2021-01-04 RX ORDER — LORATADINE 10 MG/1
10 TABLET ORAL DAILY
COMMUNITY
Start: 2020-11-30 | End: 2021-05-27

## 2021-01-04 RX ORDER — SUCRALFATE 1 G/1
TABLET ORAL
Qty: 60 TABLET | Refills: 5 | Status: SHIPPED | OUTPATIENT
Start: 2021-01-04 | End: 2021-05-27

## 2021-01-04 NOTE — PATIENT INSTRUCTIONS
1.  For GERD, continue pantoprazole 40 mg once daily.  We will have you change the timing of this medication and take it 1 hour before your evening meal.  For GERD, we recommend avoiding eating 3-4 hours before bedtime, eating smaller more frequent meals, and avoiding any known food triggers including spicy foods, tomatoes and tomato-based sauces, chocolate, coffee/tea, citrus fruits, carbonated  beverages and alcohol.       Additionally for epigastric pain, a prescription for sucralfate 1 g tablets has been sent to your pharmacy.  You will take 1 tablet and let dissolve in 15 mL of water to make a slurry and swallow up to twice daily as needed.    2.  For abdominal bloating we recommend use of a low FODMAP diet.  This will help you to isolate specific foods that could be contributing to your symptoms.    3.  We do recommend that you schedule your annual female exams, including mammogram and Pap smear.    4.  Additionally for abdominal bloating we recommend use of a daily probiotic.  This can be purchased over-the-counter at your local grocery or pharmacy.  Probiotics such as TruBiotics or 10BestThings are an option; generic formula is also fine.  We recommend 1 capsule daily.    5.  We will plan for your next colonoscopy in 5 years, and have placed you in recall accordingly.    6.  Plan for office follow-up in 6 months for reassessment of symptoms, or sooner should symptoms worsen or fail to improve.      Plan de alimentación con bajo contenido de FODMAP  Low-FODMAP Eating Plan    FODMAP es el acrónimo en inglés de oligosacáridos, disacáridos, monosacáridos y polioles fermentables y hace referencia a los azúcares que son difíciles de digerir para algunas personas. Un plan de alimentación con bajo contenido de FODMAP puede ayudar a algunas personas que tienen enfermedades de los intestinos (intestinales) a controlar kevin síntomas.  Seguir jarvis plan de alimentación puede resultar complicado. Consulte con un  especialista en dietas y nutrición (nutricionista) para diseñar un plan de alimentación con bajo contenido de FODMAP que sea adecuado para usted. Un nutricionista puede asegurarse de que consuma suficientes nutrientes con jarvis plan de alimentación.  ¿Cuáles son algunos consejos para seguir jarvis plan?  Leer las etiquetas de los alimentos  · Fanta las etiquetas para detectar FODMAP ocultos, por ejemplo:  ? Jarabe de mino fructosa.  ? Miel.  ? Agave.  ? Saborizantes de frutas naturales.  ? [place] o cebolla en polvo.  · Elija alimentos con bajo contenido de FODMAP que contengan 3 o 4 gramos de fibra por porción.  · Consulte las etiquetas de los alimentos para conocer los tamaños de las porciones. Coma solo neri porción por vez para asegurarse de mantener bajos los niveles de FODMAP.  Planificación de las comidas  · Siga un plan de alimentación con bajo contenido de FODMAP farnaz un babak de 6 semanas, o según le indique el médico o nutricionista.  · Para seguir el plan de alimentación, carlos lo siguiente:  1. Elimine los alimentos con alto contenido de FODMAP de estes dieta por completo.  2. Vuelva a introducir los alimentos con alto contenido de FODMAP en estes dieta gradualmente, de liv a la vez. La mayoría de las personas deben esperar algunos días después de introducir un alimento con alto contenido de FODMAP antes de introducir el siguiente. Estes nutricionista puede recomendarle con qué rapidez debería volver a introducir los alimentos.  3. Mantenga un registro diario de kevin comidas y bebidas y recuerde los síntomas que tiene después de comer.  4. Revise regularmente estes registro diario con el nutricionista. El nutricionista puede ayudarlo a identificar qué alimentos puede comer y cuáles debería evitar.  Consejos generales  · Galina suficiente líquido todos los días lisbet para mantener la orina de color amarillo pálido.  · No consuma alimentos procesados. Con frecuencia, estos alimentos tienen azúcar agregada y pueden tener un alto  "contenido de FODMAP.  · Evite la mayoría de los productos lácteos, los cereales integrales y los endulzantes.  · Consulte con un nutricionista para asegurarse de incluir suficiente fibra en estes alimentación.  Alimentos recomendados  Cereales  · Cereales sin gluten, lisbet arroz, maryjo, castro sarraceno, quinua, maíz, polenta y mijo. Pasta, pan y cereal sin gluten. Fideos de arroz. Tortillas de maíz.  Verduras  · Berenjena, calabacín, pepino, pimientos, judías verdes, repollitos de Brus[place]as, brotes de soja, rambo, rúcula, kale, acelga, espinaca, col berza, col china, calabaza amarilla, papa y tomate. Cantidades limitadas de maíz, zanahoria y camote. La parte zachary de los cebollines.  Frutas  · Bananas, naranjas, flor, smitha, arándanos, frambuesas, fresas, uvas, melón, melón hi, kiwi, papaya, maracuyá y hong. Cantidades limitadas de arándanos rojos disecados, chips de banana y girish rallado.  Lácteos  · Leche, yogur y kéfir sin lactosa. Queso requesón y helado sin lactosa. Leches que no ginny de origen animal, por ejemplo, de almendras, girish, cáñamo y arroz. Yogures hechos con leche que no sea de origen animal. Cantidades limitadas de queso de cabra, brie, mozzarella, parmesano, suizo y otros quesos duros.  Donta y otros alimentos ricos en proteínas  · Carne de res, cerdo, aves o pescado sin sazonar. Huevos. Tocino. Tofu (firme) y tempeh. Cantidades limitadas de anish secos y semillas, lisbet almendras, nueces, nueces de Nate, pacanas, maníes, semillas de calabaza, semillas de michlele y semillas de girasol.  Grasas y aceites  · Pastas para untar sin manteca. Aceites vegetales, lisbet el de valle, de canola y de girasol.  Condimentos y otros alimentos  · Endulzantes artificiales con nombres que no terminen en \"ol\", lisbet aspartamo, sacarina y stevia. Jarabe de leon, azúcar aleksandr, azúcar sin refinar, azúcar oneida y melaza. devika Limon, bandar, yaritza y javid infanteos.  Bebidas  · Agua y agua mineral. Refrescos " endulzados con azúcar. Pequeñas cantidades de jugo de naranjas o jugo de arándanos rojos. Té hilton y zachary. La mayoría de los vinos secos. Café.  Es posible que esta lista de alimentos con bajo contenido de FODMAP no sea completa. Hable con estes nutricionista para obtener más información.  Alimentos que se deben evitar  Cereales  · Castro, incluidos el kamut, castro pamela y sémola. Cebada y bulgur. Cuscús. Cereales a base de castro. Fideos de castro, pan, galletas saladas y pasteles.  Verduras  · Raíz de endivia, alcachofa, espárrago, repollo, arvejas, guisantes, arvejas, hongos y coliflor. Cebollas, ajo, puerros y la parte aleksandr de los cebollines.  Frutas  · Manzana, brandi, sandía, durazno, ciruela, cereza, damasco, monzon, monzon de Boys[place], higo, nectarina y ravi, en forma fresca, disecada o en jugo. Aguacate.  Lácteos  · Leche, yogur, helado y queso blando. Crema y crema agria. Salsas a base de leche. Natillas.  Donta y otros alimentos ricos en proteínas  · Donta fritas o grasosas. Salchichas. Castañas de cajú y pistachos. Soja, frijoles en salsa de tomate, frijoles negros, garbanzos, porotos colorados, habas, porotos blancos, lentejas y arvejas partidas.  Condimentos y otros alimentos  · Cualquier goma de mascar o caramelo sin azúcar. Alimentos que contienen endulzantes artificiales tales lisbet el sorbitol, manitol, isomalt o xilitol. Alimentos que contienen miel, jarabe de maíz de mino fructosa o agave. Consomé, caldo de verduras, caldo de res y caldo de zack. [place] y cebolla en polvo. Condimentos hechos con cebolla, lisbet el hummus, galileo, pickles, salsa de pepinillos, aderezo para ensaladas y salsa. Extracto de tomate.  Bebidas  · Bebidas a base de endivias. Sustitutos del café. Té de manzanilla. Té de hinojo. Vinos dulces o enriquecidos lisbet el oporto o el karen. Refrescos dietéticos hechos con isomalt, manitol, maltitol, sorbitol o xilitol. Jugo de manzana, brandi y ravi. Jugos con jarabe de maíz de mino  fructosa.  Es posible que esta lista de alimentos con alto contenido de FODMAP no sea completa. Hable con el nutricionista acerca de las mejores opciones alimenticias para usted.   Resumen  · Un plan de alimentación con bajo contenido de FODMAP es neri dieta a corto plazo en la que se eliminan los FODMAP para ayudar a aliviar los síntomas de ciertas enfermedades intestinales.  · Generalmente, el plan de alimentación dura hasta 6 semanas. Después de skylar período, gradualmente se vuelven a introducir los alimentos con alto contenido de FODMAP, de liv a la vez, para poder averiguar cuáles pueden causar los síntomas.  · Un plan de alimentación con bajo contenido de FODMAP puede ser complicado. Es aconsejable consultar a un nutricionista con experiencia en jarvis tipo de plan.  Esta información no tiene lisbet fin reemplazar el consejo del médico. Asegúrese de hacerle al médico cualquier pregunta que tenga.  Document Revised: 11/06/2018 Document Reviewed: 11/06/2018  Elsevier Patient Education © 2020 [employer] Inc[employer]

## 2021-01-04 NOTE — PROGRESS NOTES
Heartburn and Follow-up      HPI  60-year-old female presents the office today for follow-up.  A  has accompanied her in her office visit today to help facilitate communication.  She was last seen in office on 11/13/2020.  She has a history of GERD, hiatal hernia, Schatzki's ring, and nonbleeding internal hemorrhoids.    At her last office visit instructed the patient to restart pantoprazole 40 mg once daily for management of symptoms.  Gastric emptying study was ordered for further evaluation of early satiety, but was never performed.  Patient continues to report epigastric discomfort at least 2-3 nights per week in the form of a burning sensation.  Symptoms come and go. She reports that symptoms always occur in the evening approximately 2-3 nights per week.  She has noted that tunafish, coffee, and tomatoes/spicy foods will worsen symptoms.  She denies any nausea, vomiting, or dysphagia.  She underwent cholecystectomy approximately 15 years ago.    She reports having regular daily bowel movements and denies having any diarrhea, constipation, melena, or hematochezia.  She does report significant abdominal bloating, which seems to have worsened.  She has noted that beans and soda pop will worsen her bloating.  She is not up-to-date on her female exams.  She does not take a daily probiotic.      She has a family history of colon cancer in her sister.  Last EGD and colonoscopy were performed on 10/7/2020.  Colonoscopy revealed some nonbleeding internal hemorrhoids, but otherwise was unremarkable and no specimens were collected.  She would be due for her next screening colonoscopy at a 5-year interval.    Review of Systems   Constitutional: Negative for appetite change, chills, diaphoresis, fatigue, fever and unexpected weight change.   HENT: Negative for dental problem, ear pain, mouth sores, rhinorrhea, sore throat and voice change.    Eyes: Negative for pain, redness and visual disturbance.    Respiratory: Negative for cough, chest tightness and wheezing.    Cardiovascular: Negative for chest pain, palpitations and leg swelling.   Endocrine: Negative for cold intolerance, heat intolerance, polydipsia, polyphagia and polyuria.   Genitourinary: Negative for dysuria, frequency, hematuria and urgency.   Musculoskeletal: Positive for arthralgias, back pain, myalgias and neck pain. Negative for joint swelling.   Skin: Negative for rash.   Allergic/Immunologic: Negative for environmental allergies, food allergies and immunocompromised state.   Neurological: Positive for dizziness. Negative for seizures, weakness, numbness and headaches.   Hematological: Does not bruise/bleed easily.   Psychiatric/Behavioral: Positive for sleep disturbance. Negative for self-injury. The patient is nervous/anxious.           Problem List:    Patient Active Problem List   Diagnosis   • Constipation   • Gastroesophageal reflux disease   • Family history of colon cancer requiring screening colonoscopy   • Constipation   • Nausea       Medical History:    Past Medical History:   Diagnosis Date   • Anxiety    • Depression    • Diabetes (CMS/HCC)    • Hypertension    • Hypothyroidism    • OA (osteoarthritis)    • OAB (overactive bladder)    • AMELIA (obstructive sleep apnea)    • Stomach ulcer         Social History:    Social History     Socioeconomic History   • Marital status: Single     Spouse name: Not on file   • Number of children: Not on file   • Years of education: Not on file   • Highest education level: Not on file   Tobacco Use   • Smoking status: Former Smoker     Years: 30.00   • Smokeless tobacco: Never Used   Substance and Sexual Activity   • Alcohol use: No   • Drug use: No   • Sexual activity: Defer       Family History:   Family History   Problem Relation Age of Onset   • Hypertension Mother    • Stroke Mother    • Diabetes Mother    • Colon polyps Mother    • Lung cancer Father    • Diabetes Sister    • Colon polyps  Sister    • Colon cancer Sister    • Crohn's disease Neg Hx    • Irritable bowel syndrome Neg Hx    • Ulcerative colitis Neg Hx        Surgical History:   Past Surgical History:   Procedure Laterality Date   •  SECTION      X2   • COLONOSCOPY N/A 2016    Procedure: COLONOSCOPY;  Surgeon: Raiza Talbot MD;  Location: [facility];  Service:    • ENDOSCOPY N/A 2016    Procedure: ESOPHAGOGASTRODUODENOSCOPY with biopsies  and link test;  Surgeon: Raiza Talbot MD;  Location: [facility] OR;  Service:    • KNEE SURGERY Right    • LAPAROSCOPIC CHOLECYSTECTOMY     • UPPER GASTROINTESTINAL ENDOSCOPY  2020         Current Outpatient Medications:   •  atenolol (TENORMIN) 25 MG tablet, Take 1 tablet by mouth Daily., Disp: 90 tablet, Rfl: 0  •  BREO ELLIPTA 100-25 MCG/INH inhaler, Inhale 1 puff Daily. Rinse mouth after each use, Disp: , Rfl: 11  •  EPINEPHrine (EPIPEN) 0.3 MG/0.3ML solution auto-injector injection, AS DIRECTED. USE IN CASE OF MEDICAL EMERGENCY, Disp: , Rfl:   •  ergocalciferol (ERGOCALCIFEROL) 1.25 MG (39519 UT) capsule, Take 50,000 Units by mouth., Disp: , Rfl:   •  fluticasone (FLONASE) 50 MCG/ACT nasal spray, 2 sprays into the nostril(s) as directed by provider Daily., Disp: 16 g, Rfl: 11  •  glipizide (Glucotrol) 10 MG tablet, Take 1 tablet by mouth 2 (Two) Times a Day Before Meals., Disp: 60 tablet, Rfl: 2  •  glucose blood test strip, Use 1 unit as needed to test blood glucose., Disp: 100 each, Rfl: 11  •  glucose monitor monitoring kit, 1 each As Needed (to test sugars)., Disp: 1 each, Rfl: 0  •  HYDROcodone-acetaminophen (NORCO)  MG per tablet, Take 1 tablet by mouth 3 (Three) Times a Day As Needed. for pain, Disp: , Rfl:   •  Lancet Device misc, 1 Units As Needed (to test blood glucose)., Disp: 1 each, Rfl: 0  •  Lancets 30G misc, 1 Units As Needed (to test blood sugar)., Disp: 100 each, Rfl: 11  •  levothyroxine (SYNTHROID, LEVOTHROID) 112 MCG tablet, Take 125 mcg by  "mouth Daily., Disp: , Rfl:   •  loratadine (CLARITIN) 10 MG tablet, Take 10 mg by mouth Daily., Disp: , Rfl:   •  metFORMIN (GLUCOPHAGE) 500 MG tablet, Take 2 tablets by mouth 2 (Two) Times a Day With Meals., Disp: 120 tablet, Rfl: 2  •  ondansetron ODT (ZOFRAN-ODT) 4 MG disintegrating tablet, Place 1 tablet on the tongue Every 8 (Eight) Hours As Needed for Nausea or Vomiting., Disp: 60 tablet, Rfl: 2  •  pantoprazole (PROTONIX) 40 MG EC tablet, Take 1 tablet by mouth Daily for 180 days., Disp: 30 tablet, Rfl: 5  •  PROAIR  (90 Base) MCG/ACT inhaler, Inhale See Admin Instructions. Inhale 2 puffs by mouth every 4 to 6 hours as needed, Disp: , Rfl: 3  •  zolpidem (AMBIEN) 10 MG tablet, TAKE 1/2 (ONE-HALF) TABLET BY MOUTH AT NIGHT AS NEEDED FOR SLEEP, Disp: 30 tablet, Rfl: 0  •  Euthyrox 100 MCG tablet, Take 1 tablet by mouth once daily, Disp: 90 tablet, Rfl: 0  •  Insulin Pen Needle (PEN NEEDLES 5/16\") 30G X 8 MM misc, 1 Units As Needed (with trulicity)., Disp: 100 each, Rfl: 0  •  lubiprostone (Amitiza) 24 MCG capsule, Take 1 capsule by mouth Daily With Breakfast., Disp: 30 capsule, Rfl: 2  •  Myrbetriq 50 MG tablet sustained-release 24 hour 24 hr tablet, Take 1 tablet by mouth once daily, Disp: 30 tablet, Rfl: 2  •  rosuvastatin (CRESTOR) 10 MG tablet, Take 1 tablet by mouth Daily., Disp: 30 tablet, Rfl: 2  •  sucralfate (CARAFATE) 1 g tablet, 1 tablet and 15 mL of water to make a slurry and swallow up to twice daily as needed for epigastric pain, Disp: 60 tablet, Rfl: 5    Allergies: No Known Allergies     The following portions of the patient's history were reviewed and updated as appropriate: allergies, current medications, past family history, past medical history, past social history, past surgical history and problem list.    Vitals:    01/04/21 1525   BP: 130/80   Pulse: 68   Temp: 97.8 °F (36.6 °C)   SpO2: 98%       Physical Exam  Vitals signs reviewed.   Constitutional:       Appearance: Normal " appearance.   Pulmonary:      Effort: Pulmonary effort is normal.   Abdominal:      General: Abdomen is flat. Bowel sounds are normal. There is no distension.      Palpations: Abdomen is soft.      Tenderness: There is no abdominal tenderness. There is no guarding or rebound.   Musculoskeletal: Normal range of motion.   Skin:     General: Skin is dry.   Neurological:      General: No focal deficit present.      Mental Status: She is alert and oriented to person, place, and time.   Psychiatric:         Mood and Affect: Mood normal.         Behavior: Behavior normal.         Thought Content: Thought content normal.         Judgment: Judgment normal.         Assessment/ Plan  Diagnoses and all orders for this visit:    1. Gastroesophageal reflux disease, unspecified whether esophagitis present (Primary)    2. Schatzki's ring    3. Epigastric pain    4. Colon cancer screening    Other orders  -     sucralfate (CARAFATE) 1 g tablet; 1 tablet and 15 mL of water to make a slurry and swallow up to twice daily as needed for epigastric pain  Dispense: 60 tablet; Refill: 5         Return in about 6 months (around 7/4/2021).    1.  For GERD, continue pantoprazole 40 mg once daily.  We will have you change the timing of this medication and take it 1 hour before your evening meal.  For GERD, we recommend avoiding eating 3-4 hours before bedtime, eating smaller more frequent meals, and avoiding any known food triggers including spicy foods, tomatoes and tomato-based sauces, chocolate, coffee/tea, citrus fruits, carbonated  beverages and alcohol.       Additionally for epigastric pain, a prescription for sucralfate 1 g tablets has been sent to your pharmacy.  You will take 1 tablet and let dissolve in 15 mL of water to make a slurry and swallow up to twice daily as needed.    2.  For abdominal bloating we recommend use of a low FODMAP diet.  This will help you to isolate specific foods that could be contributing to your symptoms.   A copy of a Romansh version of the low FODMAP diet has been attached to the patient's discharge summary.    3.  We do recommend that you schedule your annual female exams, including mammogram and Pap smear.    4.  Additionally for abdominal bloating we recommend use of a daily probiotic.  This can be purchased over-the-counter at your local grocery or pharmacy.  Probiotics such as TruBiotics or [employer] are an option; generic formula is also fine.  We recommend 1 capsule daily.    5.  We will plan for your next colonoscopy in 5 years, and have placed you in recall accordingly.    6.  Plan for office follow-up in 6 months for reassessment of symptoms, or sooner should symptoms worsen or fail to improve.    Discussion:  EGD findings and pathology reviewed with patient during her office visit today.  Due to reports of nocturnal GERD symptoms we will have her change the timing of her PPI and take this 1 hour before her evening meal.  We have also added sucralfate to her regimen.    For further evaluation/treatment of abdominal bloating we have recommended that she update her female exams and have also recommended use of a daily probiotic as well as use of the low FODMAP diet to assist her in identifying specific foods that could be contributing to her symptoms.    Due to her family history of colon cancer, she is currently in recall for her next surveillance colonoscopy at a 5-year interval, which will be due on 10/7/2025.  We will plan for office follow-up in 6 months for reassessment of symptoms, or sooner should symptoms worsen or fail to improve.  Patient verbalized understand of above and is in agreement with plan of care.  All questions answered and support provided.

## 2021-01-06 ENCOUNTER — TELEPHONE (OUTPATIENT)
Dept: FAMILY MEDICINE CLINIC | Facility: CLINIC | Age: 61
End: 2021-01-06

## 2021-01-12 ENCOUNTER — HOSPITAL ENCOUNTER (OUTPATIENT)
Dept: NUCLEAR MEDICINE | Facility: HOSPITAL | Age: 61
Discharge: HOME | End: 2021-01-12

## 2021-01-12 DIAGNOSIS — R11.2 NAUSEA AND VOMITING, INTRACTABILITY OF VOMITING NOT SPECIFIED, UNSPECIFIED VOMITING TYPE: ICD-10-CM

## 2021-01-12 DIAGNOSIS — R68.81 EARLY SATIETY: ICD-10-CM

## 2021-01-12 DIAGNOSIS — E11.9 TYPE 2 DIABETES MELLITUS WITHOUT COMPLICATION, WITHOUT LONG-TERM CURRENT USE OF INSULIN (HCC): ICD-10-CM

## 2021-01-12 DIAGNOSIS — R10.13 EPIGASTRIC PAIN: ICD-10-CM

## 2021-01-12 PROCEDURE — 78264 GASTRIC EMPTYING IMG STUDY: CPT

## 2021-01-12 PROCEDURE — A9541 TC99M SULFUR COLLOID: HCPCS | Performed by: NURSE PRACTITIONER

## 2021-01-12 PROCEDURE — 0 TECHNETIUM SULFUR COLLOID: Performed by: NURSE PRACTITIONER

## 2021-01-12 RX ADMIN — TECHNETIUM TC 99M SULFUR COLLOID 1 DOSE: KIT at 08:05

## 2021-01-14 ENCOUNTER — APPOINTMENT (OUTPATIENT)
Dept: SLEEP MEDICINE | Facility: HOSPITAL | Age: 61
End: 2021-01-14

## 2021-01-21 RX ORDER — BLOOD-GLUCOSE METER
EACH MISCELLANEOUS
Refills: 0 | OUTPATIENT
Start: 2021-01-21

## 2021-02-18 DIAGNOSIS — G47.00 INSOMNIA, UNSPECIFIED TYPE: ICD-10-CM

## 2021-02-18 DIAGNOSIS — G47.8 NON-RESTORATIVE SLEEP: ICD-10-CM

## 2021-02-18 DIAGNOSIS — G47.10 HYPERSOMNIA: ICD-10-CM

## 2021-02-18 DIAGNOSIS — G25.81 RESTLESS LEGS SYNDROME (RLS): ICD-10-CM

## 2021-02-18 DIAGNOSIS — Z79.891 CHRONIC PRESCRIPTION OPIATE USE: ICD-10-CM

## 2021-02-19 RX ORDER — ZOLPIDEM TARTRATE 10 MG/1
TABLET ORAL
Qty: 30 TABLET | Refills: 0 | Status: SHIPPED | OUTPATIENT
Start: 2021-02-19

## 2021-02-19 NOTE — TELEPHONE ENCOUNTER
I have seen her in the sleep medicine clinic.    Please call her and tell her I will fill it for her this month but I need to know her progress with my referral for her with Dr. Paez for CBT-I treatment for her insomnia.  The condition to continue prescribing Ambien was that she would be seeing Dr. Paez so he can try to wean her off of the Ambien.    She also needs to be taking iron supplementation once a day with plans to recheck her ferritin 3 months later.  Please find out if she has been taking this consistently for 3 months yet or not.

## 2021-02-23 ENCOUNTER — OFFICE VISIT (OUTPATIENT)
Dept: SLEEP MEDICINE | Facility: HOSPITAL | Age: 61
End: 2021-02-23

## 2021-02-23 VITALS
HEIGHT: 64 IN | DIASTOLIC BLOOD PRESSURE: 81 MMHG | SYSTOLIC BLOOD PRESSURE: 128 MMHG | BODY MASS INDEX: 30.39 KG/M2 | WEIGHT: 178 LBS | HEART RATE: 68 BPM

## 2021-02-23 DIAGNOSIS — K21.9 GASTROESOPHAGEAL REFLUX DISEASE, UNSPECIFIED WHETHER ESOPHAGITIS PRESENT: ICD-10-CM

## 2021-02-23 DIAGNOSIS — G47.33 OBSTRUCTIVE SLEEP APNEA: ICD-10-CM

## 2021-02-23 DIAGNOSIS — F51.01 PRIMARY INSOMNIA: Primary | ICD-10-CM

## 2021-02-23 PROCEDURE — G0463 HOSPITAL OUTPT CLINIC VISIT: HCPCS

## 2021-02-23 RX ORDER — QUETIAPINE FUMARATE 50 MG/1
50 TABLET, FILM COATED ORAL NIGHTLY
Qty: 30 TABLET | Refills: 3 | Status: SHIPPED | OUTPATIENT
Start: 2021-02-23 | End: 2021-05-27

## 2021-02-23 NOTE — PROGRESS NOTES
PULMONARY  CONSULT NOTE      PATIENT IDENTIFICATION:  Name: Violet Rahman  Age: 61 y.o.  Sex: female  :  1960  MRN: [id number]    DATE OF CONSULTATION:  2021                     CHIEF COMPLAINT:  insomnia    History of Present Illness:   Violet Rahman is a 61 y.o. female Over more than 2 years pt started having trouble falling a sleep at night for 2-4 hours even dark quit bed room, tried different meds ( ambien, klonopin hydroxyzine, and over the over counter, also multiple wakening up at night every 2 hours 15-30 min, in Am still tired weak and fatigue, denies any nabs during the day.     Pt with still multiple wakening up at night with sleepiness fatigue and snoring, witnessed apnea, Hard  to get up in the morning. Daytime fatigue sleepiness loss of energy,sleep study showed AHI 4     Review of Systems:   Constitutional: As above    Eyes: negative   ENT/oropharynx: negative   Cardiovascular: negative   Respiratory: negative   Gastrointestinal: negative   Genitourinary: negative   Neurological: negative   Musculoskeletal: negative   Integument/breast: negative   Endocrine: negative   Allergic/Immunologic: negative     Past Medical History:  Past Medical History:   Diagnosis Date   • Anxiety    • Depression    • Diabetes (CMS/HCC)    • Hypertension    • Hypothyroidism    • OA (osteoarthritis)    • OAB (overactive bladder)    • AMELIA (obstructive sleep apnea)    • Stomach ulcer        Past Surgical History:  Past Surgical History:   Procedure Laterality Date   •  SECTION      X2   • COLONOSCOPY N/A 2016    Procedure: COLONOSCOPY;  Surgeon: Raiza Talbot MD;  Location: [facility] OR;  Service:    • ENDOSCOPY N/A 2016    Procedure: ESOPHAGOGASTRODUODENOSCOPY with biopsies  and link test;  Surgeon: Raiza Talbot MD;  Location: [facility] OR;  Service:    • KNEE SURGERY Right    • LAPAROSCOPIC CHOLECYSTECTOMY     • UPPER GASTROINTESTINAL ENDOSCOPY      [facility]  History:  Family History   Problem Relation Age of Onset   • Hypertension Mother    • Stroke Mother    • Diabetes Mother    • Colon polyps Mother    • Lung cancer Father    • Diabetes Sister    • Colon polyps Sister    • Colon cancer Sister    • Crohn's disease Neg Hx    • Irritable bowel syndrome Neg Hx    • Ulcerative colitis Neg Hx         Social History:   Social History     Tobacco Use   • Smoking status: Former Smoker     Years: 30.00   • Smokeless tobacco: Never Used   Substance Use Topics   • Alcohol use: No        Allergies:  No Known Allergies    Home Meds:  (Not in a hospital admission)      Objective:    Vitals Ranges:   Heart Rate:  [68] 68  BP: (128)/(81) 128/81  Body mass index is 30.55 kg/m².     Exam:  General Appearance:  WDWN    HEENT:   without obvious abnormality,  Conjunctiva/corneas clear,  Normal external ear canals, no drainage    Clear orsalmucosa,  Mallampati score 3    Neck:  Supple, symmetrical, trachea midline. No JVD.  Lungs:   Bilateral basal rhonchi bilaterally, respirations unlabored symmetrical wall movement.    Chest wall:  No tenderness or deformity.    Heart:  Regular rate and rhythm, S1 and S2 normal.  Extremities: Trace edema no clubbing or Cyanosis        Data Review:  All labs (24hrs): No results found for this or any previous visit (from the past 24 hour(s)).     Imaging:  NM Gastric Emptying  Narrative: NUCLEAR MEDICINE GASTRIC EMPTYING STUDY     HISTORY:  Nausea and vomiting.     TECHNIQUE:  4 Hour solid gastric emptying study.    535 uCi of 99m  technetium sulfur colloid mixed in a standard solid meal with cooked  eggs, anterior static imaging over the abdomen performed hourly for 4  hours.      FINDINGS: The gastric retention measures:  63% at 1 hour (normal between 30-90%)  46% at 2 hours (normal 60% or less)  32% at 3 hours (normal 30% or less)  6% at 4 hours (normal 10% or less)     Impression: Gastric emptying is essentially within normal limits.     This report was  finalized on 1/12/2021 3:43 PM by Dr. Miguelito Prado M.D.          ASSESSMENT:  Diagnoses and all orders for this visit:    Primary insomnia    Obstructive sleep apnea    Gastroesophageal reflux disease, unspecified whether esophagitis present        PLAN:  Sleep hygiene tips discussed in details with pt and plan to follow it with maintaining regular time for sleep and using sleep aid as needed   Add Seroquel 50 mg nightly as needed     Avoid supine avoid sedative meds in pm, weight loss, Avoid driving. Long discussion with patient about the physiology of AMELIA, and long term and short term   benefit of treating AMELIA         Varun Burciaga MD. D, ABSM.  2/23/2021  10:56 EST

## 2021-04-20 ENCOUNTER — OFFICE VISIT (OUTPATIENT)
Dept: SLEEP MEDICINE | Facility: HOSPITAL | Age: 61
End: 2021-04-20

## 2021-04-20 VITALS
BODY MASS INDEX: 30.73 KG/M2 | WEIGHT: 180 LBS | DIASTOLIC BLOOD PRESSURE: 77 MMHG | HEIGHT: 64 IN | SYSTOLIC BLOOD PRESSURE: 128 MMHG | HEART RATE: 66 BPM

## 2021-04-20 DIAGNOSIS — F41.9 ANXIETY: ICD-10-CM

## 2021-04-20 DIAGNOSIS — F51.01 PRIMARY INSOMNIA: Primary | ICD-10-CM

## 2021-04-20 DIAGNOSIS — G47.33 OBSTRUCTIVE SLEEP APNEA: ICD-10-CM

## 2021-04-20 PROCEDURE — G0463 HOSPITAL OUTPT CLINIC VISIT: HCPCS

## 2021-04-20 NOTE — PROGRESS NOTES
PULMONARY  CONSULT NOTE      PATIENT IDENTIFICATION:  Name: Violet Rahman  Age: 61 y.o.  Sex: female  :  1960  MRN: [id number]    DATE OF CONSULTATION:  2021                     CHIEF COMPLAINT: insomnia     History of Present Illness:   Violet Rahman is a 61 y.o. female with chronic insomnia, she has been on Ambien and she said this is the only medication was working for her, tried Seroquel did not work and we do not have confirmation she got the prescription, also she was referred to psychiatrist and she did not follow-up with him  Patient still having difficulty going to sleep for few hours, multiple waking up at night, I did have detailed discussion with the patient about using medication sleep behavior again is stressed on it, and I recommended for her to follow-up with psychiatrist which did not see she is blaming that it is to [facility]      Review of Systems:   Constitutional:  As above   Eyes: negative   ENT/oropharynx: negative   Cardiovascular: negative   Respiratory: negative   Gastrointestinal: negative   Genitourinary: negative   Neurological: negative   Musculoskeletal: negative   Integument/breast: negative   Endocrine: negative   Allergic/Immunologic: negative     Past Medical History:  Past Medical History:   Diagnosis Date   • Anxiety    • Depression    • Diabetes (CMS/HCC)    • Hypertension    • Hypothyroidism    • OA (osteoarthritis)    • OAB (overactive bladder)    • AMELIA (obstructive sleep apnea)    • Stomach ulcer        Past Surgical History:  Past Surgical History:   Procedure Laterality Date   •  SECTION      X2   • COLONOSCOPY N/A 2016    Procedure: COLONOSCOPY;  Surgeon: Raiza Talbot MD;  Location: [facility] OR;  Service:    • ENDOSCOPY N/A 2016    Procedure: ESOPHAGOGASTRODUODENOSCOPY with biopsies  and link test;  Surgeon: Raiza Talbot MD;  Location: [facility] OR;  Service:    • KNEE SURGERY Right    • LAPAROSCOPIC CHOLECYSTECTOMY     • UPPER  GASTROINTESTINAL ENDOSCOPY  2020 [place]        Family History:  Family History   Problem Relation Age of Onset   • Hypertension Mother    • Stroke Mother    • Diabetes Mother    • Colon polyps Mother    • Lung cancer Father    • Diabetes Sister    • Colon polyps Sister    • Colon cancer Sister    • Crohn's disease Neg Hx    • Irritable bowel syndrome Neg Hx    • Ulcerative colitis Neg Hx         Social History:   Social History     Tobacco Use   • Smoking status: Former Smoker     Years: 30.00   • Smokeless tobacco: Never Used   Substance Use Topics   • Alcohol use: No        Allergies:  No Known Allergies    Home Meds:  (Not in a hospital admission)      Objective:    Vitals Ranges:   Heart Rate:  [66] 66  BP: (128)/(77) 128/77  Body mass index is 30.9 kg/m².     Exam:  General Appearance:  WDWN    HEENT:   without obvious abnormality,  Conjunctiva/corneas clear,  Normal external ear canals, no drainage    Clear orsalmucosa,  Mallampati score 3    Neck:  Supple, symmetrical, trachea midline. No JVD.  Lungs:   Bilateral basal rhonchi bilaterally, respirations unlabored symmetrical wall movement.    Chest wall:  No tenderness or deformity.    Heart:  Regular rate and rhythm, S1 and S2 normal.  Extremities: Trace edema no clubbing or Cyanosis        Data Review:  All labs (24hrs): No results found for this or any previous visit (from the past 24 hour(s)).     Imaging:  NM Gastric Emptying  Narrative: NUCLEAR MEDICINE GASTRIC EMPTYING STUDY     HISTORY:  Nausea and vomiting.     TECHNIQUE:  4 Hour solid gastric emptying study.    535 uCi of 99m  technetium sulfur colloid mixed in a standard solid meal with cooked  eggs, anterior static imaging over the abdomen performed hourly for 4  hours.      FINDINGS: The gastric retention measures:  63% at 1 hour (normal between 30-90%)  46% at 2 hours (normal 60% or less)  32% at 3 hours (normal 30% or less)  6% at 4 hours (normal 10% or less)     Impression: Gastric emptying  is essentially within normal limits.     This report was finalized on 1/12/2021 3:43 PM by Dr. Miguelito Prado M.D.          ASSESSMENT:  Diagnoses and all orders for this visit:    Primary insomnia    Obstructive sleep apnea    Anxiety        PLAN:  Sleep hygiene tips discussed in details with pt and plan to follow it with maintaining regular time for sleep and using sleep aid as needed, I give her like 15 tablets of 10 mg Ambien for the next few days and she need to follow-up with the sleep psychiatrist as soon as possible she did not see him last time    Avoid supine sleeping avoid sedative medication in the evening     Follow-up as needed    Varun Burciaga MD. D, ABSM.  4/20/2021  11:25 EDT

## 2021-04-22 RX ORDER — ATENOLOL 25 MG/1
TABLET ORAL
Qty: 90 TABLET | Refills: 0 | OUTPATIENT
Start: 2021-04-22

## 2021-05-26 ENCOUNTER — TELEPHONE (OUTPATIENT)
Dept: SLEEP MEDICINE | Facility: HOSPITAL | Age: 61
End: 2021-05-26

## 2021-05-27 ENCOUNTER — OFFICE VISIT (OUTPATIENT)
Dept: ORTHOPEDIC SURGERY | Facility: CLINIC | Age: 61
End: 2021-05-27

## 2021-05-27 VITALS — HEIGHT: 64 IN | WEIGHT: 180 LBS | BODY MASS INDEX: 30.73 KG/M2

## 2021-05-27 DIAGNOSIS — M25.561 ACUTE PAIN OF RIGHT KNEE: Primary | ICD-10-CM

## 2021-05-27 DIAGNOSIS — M17.11 PRIMARY OSTEOARTHRITIS OF RIGHT KNEE: ICD-10-CM

## 2021-05-27 PROCEDURE — 99203 OFFICE O/P NEW LOW 30 MIN: CPT | Performed by: ORTHOPAEDIC SURGERY

## 2021-05-27 PROCEDURE — 73562 X-RAY EXAM OF KNEE 3: CPT | Performed by: ORTHOPAEDIC SURGERY

## 2021-05-27 PROCEDURE — 20610 DRAIN/INJ JOINT/BURSA W/O US: CPT | Performed by: ORTHOPAEDIC SURGERY

## 2021-05-27 RX ORDER — BETAMETHASONE SODIUM PHOSPHATE AND BETAMETHASONE ACETATE 3; 3 MG/ML; MG/ML
6 INJECTION, SUSPENSION INTRA-ARTICULAR; INTRALESIONAL; INTRAMUSCULAR; SOFT TISSUE
Status: COMPLETED | OUTPATIENT
Start: 2021-05-27 | End: 2021-05-27

## 2021-05-27 RX ORDER — LEVOTHYROXINE SODIUM 137 UG/1
137 TABLET ORAL DAILY
COMMUNITY
Start: 2021-05-03 | End: 2022-05-03

## 2021-05-27 RX ORDER — LIDOCAINE HYDROCHLORIDE 10 MG/ML
4 INJECTION, SOLUTION EPIDURAL; INFILTRATION; INTRACAUDAL; PERINEURAL
Status: COMPLETED | OUTPATIENT
Start: 2021-05-27 | End: 2021-05-27

## 2021-05-27 RX ADMIN — LIDOCAINE HYDROCHLORIDE 4 ML: 10 INJECTION, SOLUTION EPIDURAL; INFILTRATION; INTRACAUDAL; PERINEURAL at 14:47

## 2021-05-27 RX ADMIN — BETAMETHASONE SODIUM PHOSPHATE AND BETAMETHASONE ACETATE 6 MG: 3; 3 INJECTION, SUSPENSION INTRA-ARTICULAR; INTRALESIONAL; INTRAMUSCULAR; SOFT TISSUE at 14:47

## 2021-05-27 NOTE — PROGRESS NOTES
Subjective: Right knee pain     Patient ID: Violet Rahman is a 61 y.o. female.    Chief Complaint:    History of Present Illness patient known to me although has not been seen for over 4 years with chronic right knee pain.  She is unable to take anti-inflammatories due to renal disease that she is diabetic and was seen back in 2017 had a cortisone injection that helped for quite a long period of time.  The past several months she is having increasing pain discomfort making any and all activity of daily living difficult if not impossible presents for evaluation       Social History     Occupational History   • Not on file   Tobacco Use   • Smoking status: Former Smoker     Years: 30.00   • Smokeless tobacco: Never Used   Substance and Sexual Activity   • Alcohol use: No   • Drug use: No   • Sexual activity: Defer      Review of Systems   Constitutional: Negative for chills, diaphoresis and unexpected weight change.   HENT: Negative for hearing loss, nosebleeds, sore throat and tinnitus.    Eyes: Negative for pain and visual disturbance.   Respiratory: Negative for cough, shortness of breath and wheezing.    Cardiovascular: Negative for chest pain and palpitations.   Gastrointestinal: Negative for abdominal pain, diarrhea, nausea and vomiting.   Endocrine: Negative for cold intolerance, heat intolerance and polydipsia.   Genitourinary: Negative for difficulty urinating, dyspareunia and hematuria.   Musculoskeletal: Positive for arthralgias and gait problem.   Skin: Negative for rash and wound.   Allergic/Immunologic: Negative for environmental allergies.   Neurological: Negative for dizziness, syncope and numbness.   Hematological: Does not bruise/bleed easily.   Psychiatric/Behavioral: Negative for dysphoric mood and sleep disturbance. The patient is not nervous/anxious.            Objective:      Ortho Exam  AP lateral sunrise view of the knee does show progression of the arthritis particularly medial patellofemoral  joint compared to x-rays of 4 years ago.  Increasing osteophyte formation is for formation noted.  She is alert and oriented x3.  Head is normocephalic and sclerae clear.  The knee shows no swelling effusion erythema she has 0 to 125 degrees of motion with moderate patellofemoral crepitance and pain but no subluxation.  Joint line tenderness with negative Stephen's.  No instability 0 90 degrees nor any varus or valgus instability at 10 to 30 degrees.  Quad and hamstring function 5/5 the calf is nontender.  She does complain of some numbness in the right foot which may be secondary to the diabetic neuropathy although she does complain of numbness in the left foot as severe.  She has good capillary refill.  Again unable to take anti-inflammatories    Assessment:        1. Acute pain of right knee    2. Primary osteoarthritis of right knee           Plan: Reviewed the patient's x-rays history and physical exam.  She has progressing osteoarthritis of that knee.  She did not respond to the cortisone injection given back in 2017 so after reviewing treatment options regarding proceeding with a repeat cortisone injection as opposed to gel injections.  After sterile prep and she underwent injection of 4 cc lidocaine 1 cc Celestone through the superolateral portal tolerating it well.  Postinjection instructions given to the patient specifically regarding monitoring blood glucose level closely for the next 2 to 3 days.  Return to see me as needed.  As I discussed these injections can be given every 3 to 4 months and if that does not work gel injections are an option.  Answered all questions  Large Joint Arthrocentesis  Date/Time: 5/27/2021 2:47 PM  Consent given by: patient  Site marked: site marked  Timeout: Immediately prior to procedure a time out was called to verify the correct patient, procedure, equipment, support staff and site/side marked as required   Supporting Documentation  Indications: pain   Procedure  Details  Location: knee -   Preparation: Patient was prepped and draped in the usual sterile fashion  Needle size: 22 G  Approach: superior  Medications administered: 6 mg betamethasone acetate-betamethasone sodium phosphate 6 (3-3) MG/ML; 4 mL lidocaine PF 1% 1 %  Patient tolerance: patient tolerated the procedure well with no immediate complications                          Dictated utilizing Dragon dictation

## 2021-09-13 ENCOUNTER — OFFICE VISIT (OUTPATIENT)
Dept: ORTHOPEDIC SURGERY | Facility: CLINIC | Age: 61
End: 2021-09-13

## 2021-09-13 VITALS
RESPIRATION RATE: 16 BRPM | DIASTOLIC BLOOD PRESSURE: 80 MMHG | WEIGHT: 180 LBS | HEART RATE: 93 BPM | HEIGHT: 64 IN | BODY MASS INDEX: 30.73 KG/M2 | SYSTOLIC BLOOD PRESSURE: 151 MMHG

## 2021-09-13 DIAGNOSIS — M25.562 LEFT KNEE PAIN, UNSPECIFIED CHRONICITY: Primary | ICD-10-CM

## 2021-09-13 DIAGNOSIS — M17.11 PRIMARY OSTEOARTHRITIS OF RIGHT KNEE: ICD-10-CM

## 2021-09-13 DIAGNOSIS — M17.12 PRIMARY OSTEOARTHRITIS OF LEFT KNEE: ICD-10-CM

## 2021-09-13 PROCEDURE — 20610 DRAIN/INJ JOINT/BURSA W/O US: CPT | Performed by: ORTHOPAEDIC SURGERY

## 2021-09-13 PROCEDURE — 73562 X-RAY EXAM OF KNEE 3: CPT | Performed by: ORTHOPAEDIC SURGERY

## 2021-09-13 PROCEDURE — 99214 OFFICE O/P EST MOD 30 MIN: CPT | Performed by: ORTHOPAEDIC SURGERY

## 2021-09-13 RX ORDER — LORATADINE 10 MG/1
10 TABLET ORAL EVERY MORNING
COMMUNITY
Start: 2021-08-23 | End: 2022-12-13

## 2021-09-13 RX ORDER — BETAMETHASONE SODIUM PHOSPHATE AND BETAMETHASONE ACETATE 3; 3 MG/ML; MG/ML
6 INJECTION, SUSPENSION INTRA-ARTICULAR; INTRALESIONAL; INTRAMUSCULAR; SOFT TISSUE
Status: COMPLETED | OUTPATIENT
Start: 2021-09-13 | End: 2021-09-13

## 2021-09-13 RX ORDER — PANTOPRAZOLE SODIUM 40 MG/1
40 TABLET, DELAYED RELEASE ORAL EVERY MORNING
COMMUNITY
Start: 2021-08-23

## 2021-09-13 RX ORDER — MIRTAZAPINE 15 MG/1
15 TABLET, FILM COATED ORAL
COMMUNITY
Start: 2021-07-02

## 2021-09-13 RX ORDER — ERGOCALCIFEROL 1.25 MG/1
50000 CAPSULE ORAL WEEKLY
COMMUNITY
Start: 2021-09-01

## 2021-09-13 RX ORDER — LIDOCAINE HYDROCHLORIDE 10 MG/ML
4 INJECTION, SOLUTION EPIDURAL; INFILTRATION; INTRACAUDAL; PERINEURAL
Status: COMPLETED | OUTPATIENT
Start: 2021-09-13 | End: 2021-09-13

## 2021-09-13 RX ADMIN — BETAMETHASONE SODIUM PHOSPHATE AND BETAMETHASONE ACETATE 6 MG: 3; 3 INJECTION, SUSPENSION INTRA-ARTICULAR; INTRALESIONAL; INTRAMUSCULAR; SOFT TISSUE at 10:24

## 2021-09-13 RX ADMIN — LIDOCAINE HYDROCHLORIDE 4 ML: 10 INJECTION, SOLUTION EPIDURAL; INFILTRATION; INTRACAUDAL; PERINEURAL at 10:24

## 2021-09-13 NOTE — PROGRESS NOTES
Subjective: Bilateral knee pain left worse than right     Patient ID: Violet Rahman is a 61 y.o. female.    Chief Complaint:    History of Present Illness 61-year-old female known to me previously treated for osteoarthritis of the right knee with a cortisone injection in May is seen today now for acute pain in the left knee and return of pain in the right knee.  No history of trauma.  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  Left knee pain started the past couple of weeks.       Social History     Occupational History   • Not on file   Tobacco Use   • Smoking status: Former Smoker     Years: 30.00   • Smokeless tobacco: Never Used   Vaping Use   • Vaping Use: Never used   Substance and Sexual Activity   • Alcohol use: No   • Drug use: No   • Sexual activity: Defer      Review of Systems      Past Medical History:   Diagnosis Date   • Anxiety    • Depression    • Diabetes (CMS/HCC)    • Hypertension    • Hypothyroidism    • OA (osteoarthritis)    • OAB (overactive bladder)    • AMELIA (obstructive sleep apnea)    • Stomach ulcer      Past Surgical History:   Procedure Laterality Date   •  SECTION      X2   • COLONOSCOPY N/A 2016    Procedure: COLONOSCOPY;  Surgeon: Raiza Talbot MD;  Location: [facility] OR;  Service:    • ENDOSCOPY N/A 2016    Procedure: ESOPHAGOGASTRODUODENOSCOPY with biopsies  and link test;  Surgeon: Raiza Talbot MD;  Location: [facility] OR;  Service:    • KNEE SURGERY Right    • LAPAROSCOPIC CHOLECYSTECTOMY     • UPPER GASTROINTESTINAL ENDOSCOPY      [place]     Family History   Problem Relation Age of Onset   • Hypertension Mother    • Stroke Mother    • Diabetes Mother    • Colon polyps Mother    • Lung cancer Father    • Diabetes Sister    • Colon polyps Sister    • Colon cancer Sister    • Crohn's disease Neg Hx    • Irritable bowel syndrome Neg Hx    • Ulcerative colitis Neg  Hx          Objective:  Vitals:    09/13/21 1001   BP: 151/80   Pulse: 93   Resp: 16         09/13/21  1001   Weight: 81.6 kg (180 lb)     Body mass index is 30.9 kg/m².        Ortho Exam   AP lateral sunrise view of the left knee does show decreased medial joint space in the patellofemoral space with some small osteophytes seen.  No prior x-rays available comparison.  She is alert and oriented x3.  The left knee shows no swelling effusion erythema the skin is cool to touch he has marked pain and crepitus with range of motion to 0 120 degrees.  Marked medial joint line tenderness with negative Stephen's.  Quad hamstring function of 4 out of 5 secondary to pain.  No instability 09 degrees nor any varus or valgus instability at 10 to 30 degrees.  Her calf is nontender.  The distal pulses no motor or sensory deficit.  The right knee shows no swelling effusion erythema but there is pain and crepitance once again with range of motion to 0 125 degrees.  Bilateral joint line tenderness with negative Stephen's.  Quad and hamstring function is 5/5 the calf remains nontender.  No instability throughout the arc of motion is 09 degrees nor any varus or valgus instability at 10 to 30 degrees.  She has good capillary refill in the leg.    Assessment:        1. Left knee pain, unspecified chronicity    2. Primary osteoarthritis of right knee    3. Primary osteoarthritis of left knee           Plan: Reviewed the x-rays on the left knee and her symptoms of both knees today.  Unable to take anti-inflammatories as she is an insulin-dependent diabetic.  I did advise her on the Voltaren gel.  After reviewing treatment options we can proceed with bilateral cortisone injections of lidocaine and Celestone 4-1 given each knee through the superolateral portal after sterile prepping.  Postinjection instructions given to the patient particularly monitor blood glucose level for the next 3 days.  Return to see me as needed.  As discussed with  cortisone does not last at least 3 months gel injections are an option.  Answered all questions            Work Status:    RICARDO query complete.    Orders:  Orders Placed This Encounter   Procedures   • Large Joint Arthrocentesis   • XR Knee 3 View Left       Medications:  No orders of the defined types were placed in this encounter.      Followup:  Return if symptoms worsen or fail to improve.          Dictated utilizing Dragon dictation

## 2021-09-13 NOTE — PROGRESS NOTES
Large Joint Arthrocentesis  Date/Time: 9/13/2021 10:24 AM  Consent given by: patient  Site marked: site marked  Timeout: Immediately prior to procedure a time out was called to verify the correct patient, procedure, equipment, support staff and site/side marked as required   Supporting Documentation  Indications: pain   Procedure Details  Location: knee (bilateral) -   Preparation: Patient was prepped and draped in the usual sterile fashion  Needle size: 22 G  Approach: superior  Medications administered: 6 mg betamethasone acetate-betamethasone sodium phosphate 6 (3-3) MG/ML; 4 mL lidocaine PF 1% 1 %  Patient tolerance: patient tolerated the procedure well with no immediate complications

## 2021-11-18 RX ORDER — MIRABEGRON 50 MG/1
TABLET, FILM COATED, EXTENDED RELEASE ORAL
Qty: 90 TABLET | Refills: 0 | OUTPATIENT
Start: 2021-11-18

## 2022-06-09 ENCOUNTER — TELEPHONE (OUTPATIENT)
Dept: ORTHOPEDIC SURGERY | Facility: CLINIC | Age: 62
End: 2022-06-09

## 2022-06-09 NOTE — TELEPHONE ENCOUNTER
Provider: DR. DÍAZ  Caller: FREDERICK MONTES  Relationship to Patient: SELF  Phone Number: [phone]  Reason for Call: PATIENT IS WANTING TO SCHEDULE AN INJECTION FOR THEIR LEFT KNEE. LAST INJECTION 09/12/21. REQUESTING CALL BACK. PLEASE ADVISE.

## 2022-11-22 ENCOUNTER — OFFICE VISIT (OUTPATIENT)
Dept: FAMILY MEDICINE CLINIC | Facility: CLINIC | Age: 62
End: 2022-11-22

## 2022-11-22 DIAGNOSIS — E11.65 TYPE 2 DIABETES MELLITUS WITH HYPERGLYCEMIA, UNSPECIFIED WHETHER LONG TERM INSULIN USE: Primary | ICD-10-CM

## 2023-12-26 ENCOUNTER — OFFICE VISIT (OUTPATIENT)
Dept: ORTHOPEDIC SURGERY | Facility: CLINIC | Age: 63
End: 2023-12-26
Payer: MEDICARE

## 2023-12-26 VITALS — BODY MASS INDEX: 30.56 KG/M2 | HEIGHT: 64 IN | WEIGHT: 179 LBS

## 2023-12-26 DIAGNOSIS — M25.561 PAIN IN BOTH KNEES, UNSPECIFIED CHRONICITY: Primary | ICD-10-CM

## 2023-12-26 DIAGNOSIS — M17.0 PRIMARY OSTEOARTHRITIS OF BOTH KNEES: ICD-10-CM

## 2023-12-26 DIAGNOSIS — M25.562 PAIN IN BOTH KNEES, UNSPECIFIED CHRONICITY: Primary | ICD-10-CM

## 2023-12-26 RX ORDER — TRIAMCINOLONE ACETONIDE 40 MG/ML
80 INJECTION, SUSPENSION INTRA-ARTICULAR; INTRAMUSCULAR
Status: COMPLETED | OUTPATIENT
Start: 2023-12-26 | End: 2023-12-26

## 2023-12-26 RX ORDER — LIDOCAINE HYDROCHLORIDE 10 MG/ML
4 INJECTION, SOLUTION EPIDURAL; INFILTRATION; INTRACAUDAL; PERINEURAL
Status: COMPLETED | OUTPATIENT
Start: 2023-12-26 | End: 2023-12-26

## 2023-12-26 RX ADMIN — TRIAMCINOLONE ACETONIDE 80 MG: 40 INJECTION, SUSPENSION INTRA-ARTICULAR; INTRAMUSCULAR at 10:09

## 2023-12-26 RX ADMIN — LIDOCAINE HYDROCHLORIDE 4 ML: 10 INJECTION, SOLUTION EPIDURAL; INFILTRATION; INTRACAUDAL; PERINEURAL at 10:09

## 2023-12-26 NOTE — PROGRESS NOTES
Subjective: Bilateral knee pain     Patient ID: Violet Rahman is a 63 y.o. female.    Chief Complaint:    History of Present Illness 63-year-old female with further complaint of bilateral knee.  It is worsened in the past couple of months.  Was seen over 2 years ago and underwent a cortisone injection did well just recently.  Was unable to take anti-inflammatories as she is diabetic and has marginal renal function.  Now has pain on a daily basis particular getting in and out of chair and navigating steps and walking long distances.  Patient recently tested positive for COVID return the history and exam and the entire visit of vascular is warm by the patient and her right knee.       Social History     Occupational History    Not on file   Tobacco Use    Smoking status: Former     Years: 30     Types: Cigarettes    Smokeless tobacco: Never   Vaping Use    Vaping Use: Never used   Substance and Sexual Activity    Alcohol use: No    Drug use: No    Sexual activity: Defer      Review of Systems   Constitutional:  Negative for chills, diaphoresis, fever and unexpected weight change.   HENT:  Negative for hearing loss, nosebleeds, sore throat and tinnitus.    Eyes:  Negative for pain and visual disturbance.   Respiratory:  Negative for cough, shortness of breath and wheezing.    Cardiovascular:  Negative for chest pain and palpitations.   Gastrointestinal:  Negative for abdominal pain, diarrhea, nausea and vomiting.   Endocrine: Negative for cold intolerance, heat intolerance and polydipsia.   Genitourinary:  Negative for difficulty urinating, dysuria and hematuria.   Musculoskeletal:  Positive for arthralgias and myalgias. Negative for joint swelling.   Skin:  Negative for rash and wound.   Allergic/Immunologic: Negative for environmental allergies.   Neurological:  Negative for dizziness, syncope and numbness.   Hematological:  Does not bruise/bleed easily.   Psychiatric/Behavioral:  Negative for dysphoric mood and sleep  disturbance. The patient is not nervous/anxious.          Past Medical History:   Diagnosis Date    Anxiety     Depression     Diabetes     Hypertension     Hypothyroidism     OA (osteoarthritis)     OAB (overactive bladder)     AMELIA (obstructive sleep apnea)     Stomach ulcer      Past Surgical History:   Procedure Laterality Date     SECTION      X2    COLONOSCOPY N/A 2016    Procedure: COLONOSCOPY;  Surgeon: Raiza Talbot MD;  Location: [facility] OR;  Service:     ENDOSCOPY N/A 2016    Procedure: ESOPHAGOGASTRODUODENOSCOPY with biopsies  and link test;  Surgeon: Raiza Talbot MD;  Location: [facility] OR;  Service:     KNEE SURGERY Right     LAPAROSCOPIC CHOLECYSTECTOMY      UPPER GASTROINTESTINAL ENDOSCOPY      Wilks     Family History   Problem Relation Age of Onset    Hypertension Mother     Stroke Mother     Diabetes Mother     Colon polyps Mother     Lung cancer Father     Diabetes Sister     Colon polyps Sister     Colon cancer Sister     Crohn's disease Neg Hx     Irritable bowel syndrome Neg Hx     Ulcerative colitis Neg Hx          Objective:  There were no vitals filed for this visit.      23  0939   Weight: 81.2 kg (179 lb)     Body mass index is 30.73 kg/m².        Ortho Exam   AP lateral sunrise view of the knee does show progression of the arthritis particular in the medial patellofemoral compartment of both knees compared to x-rays done 2 years ago.  She is alert and oriented x 3.  Neither knee shows any swelling effusion erythema the knees are cool to touch.  There is marked pain and crepitus with range of motion to 0 to 125 degrees particular in the right knee.  Quad hamstring function is 5/5.  No instability in flexion extension nor any varus or valgus instability at 10 to 30 degrees.  There is medial joint line tenderness with negative Stephen's.  Calves are nontender.  There is no pulses no motor or sensory deficit.    Assessment:        1. Pain in both knees,  unspecified chronicity    2. Primary osteoarthritis of both knees           Plan: Reviewed with the patient her x-rays history and physical exam.  She has progression of the arthritis of both knees particularly right side which is most symptomatic.  She is unable to take anti-inflammatories and since she had a good response to cortisone 2 years ago we will proceed with bilateral cortisone injections of both knees of 4 cc of lidocaine and 2 cc of Kenalog which is given to the superolateral portal after sterile prepping.  Postinjection instructions to take about moderate to light glucose levels given to the patient.  Also advised on the use of Voltaren gel 4 times a day.  Return to see me as needed.  Answered all questions  - Large Joint Arthrocentesis: bilateral knee on 12/26/2023 10:09 AM  Indications: pain  Details: 22 G needle, superolateral approach  Medications (Right): 80 mg triamcinolone acetonide 40 MG/ML; 4 mL lidocaine PF 1% 1 %  Medications (Left): 80 mg triamcinolone acetonide 40 MG/ML; 4 mL lidocaine PF 1% 1 %  Outcome: tolerated well, no immediate complications  Procedure, treatment alternatives, risks and benefits explained, specific risks discussed. Immediately prior to procedure a time out was called to verify the correct patient, procedure, equipment, support staff and site/side marked as required. Patient was prepped and draped in the usual sterile fashion.                 Work Status:    RICARDO query complete.    Orders:  Orders Placed This Encounter   Procedures    - Large Joint Arthrocentesis: bilateral knee    XR Knee 3 View Bilateral       Medications:  No orders of the defined types were placed in this encounter.      Followup:  Return if symptoms worsen or fail to improve.          Dictated utilizing Dragon dictation